# Patient Record
Sex: FEMALE | Race: BLACK OR AFRICAN AMERICAN | NOT HISPANIC OR LATINO | Employment: FULL TIME | URBAN - METROPOLITAN AREA
[De-identification: names, ages, dates, MRNs, and addresses within clinical notes are randomized per-mention and may not be internally consistent; named-entity substitution may affect disease eponyms.]

---

## 2017-02-24 DIAGNOSIS — I10 ESSENTIAL HYPERTENSION, BENIGN: ICD-10-CM

## 2017-02-24 RX ORDER — DILTIAZEM HYDROCHLORIDE 240 MG/1
240 CAPSULE, EXTENDED RELEASE ORAL DAILY
Qty: 90 CAPSULE | Refills: 3 | Status: SHIPPED | OUTPATIENT
Start: 2017-02-24 | End: 2018-03-06

## 2017-02-24 NOTE — TELEPHONE ENCOUNTER
diltiazem 240 MG 24 hr ER capsule   Last Written Prescription Date:  4/1/16  Last Fill Quantity: 90,   # refills: 3  Last Office Visit with G, [facility] or [facility] prescribing provider: 9/13/16  Future Office visit:   None    BP Readings from Last 3 Encounters:   09/13/16 110/77   04/01/16 105/73   02/25/16 100/69

## 2017-03-09 DIAGNOSIS — M54.50 LUMBAGO: ICD-10-CM

## 2017-03-09 NOTE — TELEPHONE ENCOUNTER
cyclobenzaprine (FLEXERIL) 10 MG tablet  Last Written Prescription Date:  4/15/16  Last Fill Quantity: 90 ,   # refills:  3  Last Office Visit with G, P or [facility] prescribing provider: 9/13/16  Future Office visit:   none    Routing refill request to provider for review/approval because:  cyclobenzaprine (FLEXERIL) 10 MG tablet. not on SO protocol.

## 2017-03-10 RX ORDER — CYCLOBENZAPRINE HCL 10 MG
10 TABLET ORAL AT BEDTIME
Qty: 90 TABLET | Refills: 1 | Status: SHIPPED | OUTPATIENT
Start: 2017-03-10 | End: 2017-10-06

## 2017-03-27 DIAGNOSIS — E78.5 HYPERLIPIDEMIA LDL GOAL <100: ICD-10-CM

## 2017-03-27 RX ORDER — ROSUVASTATIN CALCIUM 40 MG/1
40 TABLET, COATED ORAL DAILY
Qty: 90 TABLET | Refills: 1 | Status: SHIPPED | OUTPATIENT
Start: 2017-03-27 | End: 2017-10-06

## 2017-03-27 NOTE — TELEPHONE ENCOUNTER
rosuvastatin (CRESTOR) 40 MG tablet      Last Written Prescription Date:  9/27/2016  Last Fill Quantity: 90,   # refills: 1  Last Office Visit : 9/13/2016  Future Office visit:  0

## 2017-06-13 DIAGNOSIS — I10 HTN (HYPERTENSION): Primary | ICD-10-CM

## 2017-06-13 DIAGNOSIS — K21.9 ESOPHAGEAL REFLUX: Primary | ICD-10-CM

## 2017-06-13 RX ORDER — PANTOPRAZOLE SODIUM 40 MG/1
40 TABLET, DELAYED RELEASE ORAL DAILY
Qty: 90 TABLET | Refills: 0 | Status: SHIPPED | OUTPATIENT
Start: 2017-06-13 | End: 2017-10-06

## 2017-06-13 RX ORDER — IRBESARTAN 150 MG/1
150 TABLET ORAL AT BEDTIME
Qty: 90 TABLET | Refills: 0 | Status: SHIPPED | OUTPATIENT
Start: 2017-06-13 | End: 2017-09-20

## 2017-06-13 NOTE — TELEPHONE ENCOUNTER
pantoprazole (PROTONIX) 40 MG EC tablet      Last Written Prescription Date:  12/15/2016  Last Fill Quantity: 90,   # refills: 1  Last Office Visit : 9/13/2016  Future Office visit:  0

## 2017-06-13 NOTE — TELEPHONE ENCOUNTER
irbesartan (AVAPRO) 150 MG tablet      Last Written Prescription Date:  12/15/2016  Last Fill Quantity: 90,   # refills: 1  Last Office Visit : 9/13/2016  Future Office visit:  0    Potassium   Date Value Ref Range Status   06/21/2016 3.7 3.4 - 5.3 mmol/L Final   ]  Lab Results   Component Value Date    CR 0.70 06/21/2016     BP Readings from Last 1 Encounters:   09/13/16 110/77

## 2017-09-18 ENCOUNTER — TRANSFERRED RECORDS (OUTPATIENT)
Dept: HEALTH INFORMATION MANAGEMENT | Facility: CLINIC | Age: 62
End: 2017-09-18

## 2017-09-20 DIAGNOSIS — I10 HTN (HYPERTENSION): ICD-10-CM

## 2017-09-20 RX ORDER — IRBESARTAN 150 MG/1
150 TABLET ORAL AT BEDTIME
Qty: 30 TABLET | Refills: 0 | Status: SHIPPED | OUTPATIENT
Start: 2017-09-20 | End: 2017-10-06

## 2017-09-20 NOTE — LETTER
Dara Lamb  [address] [address]        September 20, 2017 09/20/17    This letter is a reminder that you are due to see your Primary Care Provider, Dr. Easley, for an Annual Visit. In order to remain current on your prescription medications, you must be seen by your Primary Care Provider on a yearly basis for continued care and prescription refills. Please call [phone] to schedule an appointment for an Annual Visit with Dr. Easley.     [facility],    Primary Care Center

## 2017-09-20 NOTE — TELEPHONE ENCOUNTER
irbesartan (AVAPRO) 150 MG tablet  Last Written Prescription Date:  6/13/17  Last Fill Quantity: 90,   # refills: 0  Last Office Visit with G, P or [facility] prescribing provider: 9/13/16  Future Office visit:   None    Potassium   Date Value Ref Range Status   06/21/2016 3.7 3.4 - 5.3 mmol/L Final   ]  Creatinine   Date Value Ref Range Status   06/21/2016 0.70 0.52 - 1.04 mg/dL Final   ]  BP Readings from Last 3 Encounters:   09/13/16 110/77   04/01/16 105/73   02/25/16 100/69       appt letter sent.    Routing refill request to provider for review/approval because:   irbesartan (AVAPRO) 150 MG tablet. Needs annual appt, labs past due.    Pt called and reminded of needing an appt with Dr Easley before medication can be prescribed.  Clinic numbers given for questions or concerns.  Tori Bettencourt RN 11:08 AM on 9/20/2017.

## 2017-09-20 NOTE — LETTER
Dara Lamb  [address] [address]        September 20, 2017      This letter is a reminder that you are overdue to see your Primary Care Provider for an Annual Visit and needed labs. You must be seen by your Primary Care Provider on a yearly basis and have appropriate labs drawn for continued care and prescription refills. Please call [phone] to schedule an appointment for an Annual Visit with Dr Kaushal KHAN.         [facility],    Primary Care [place]

## 2017-10-06 ENCOUNTER — OFFICE VISIT (OUTPATIENT)
Dept: INTERNAL MEDICINE | Facility: CLINIC | Age: 62
End: 2017-10-06

## 2017-10-06 VITALS
WEIGHT: 211.6 LBS | DIASTOLIC BLOOD PRESSURE: 79 MMHG | HEART RATE: 92 BPM | SYSTOLIC BLOOD PRESSURE: 115 MMHG | HEIGHT: 64 IN | BODY MASS INDEX: 36.12 KG/M2 | RESPIRATION RATE: 16 BRPM

## 2017-10-06 DIAGNOSIS — G89.29 CHRONIC LOW BACK PAIN, UNSPECIFIED BACK PAIN LATERALITY, WITH SCIATICA PRESENCE UNSPECIFIED: ICD-10-CM

## 2017-10-06 DIAGNOSIS — Z12.11 SPECIAL SCREENING FOR MALIGNANT NEOPLASMS, COLON: ICD-10-CM

## 2017-10-06 DIAGNOSIS — R32 URINARY INCONTINENCE, UNSPECIFIED TYPE: ICD-10-CM

## 2017-10-06 DIAGNOSIS — Z17.0 MALIGNANT NEOPLASM OF UPPER-INNER QUADRANT OF RIGHT BREAST IN FEMALE, ESTROGEN RECEPTOR POSITIVE (H): ICD-10-CM

## 2017-10-06 DIAGNOSIS — I10 ESSENTIAL HYPERTENSION: ICD-10-CM

## 2017-10-06 DIAGNOSIS — M85.80 OSTEOPENIA, UNSPECIFIED LOCATION: ICD-10-CM

## 2017-10-06 DIAGNOSIS — M54.5 LOW BACK PAIN, UNSPECIFIED BACK PAIN LATERALITY, UNSPECIFIED CHRONICITY, WITH SCIATICA PRESENCE UNSPECIFIED: ICD-10-CM

## 2017-10-06 DIAGNOSIS — G25.81 RESTLESS LEG SYNDROME: ICD-10-CM

## 2017-10-06 DIAGNOSIS — E03.9 HYPOTHYROIDISM, UNSPECIFIED TYPE: ICD-10-CM

## 2017-10-06 DIAGNOSIS — Z12.31 VISIT FOR SCREENING MAMMOGRAM: ICD-10-CM

## 2017-10-06 DIAGNOSIS — G31.84 MILD COGNITIVE IMPAIRMENT: ICD-10-CM

## 2017-10-06 DIAGNOSIS — F33.1 MAJOR DEPRESSIVE DISORDER, RECURRENT EPISODE, MODERATE (H): ICD-10-CM

## 2017-10-06 DIAGNOSIS — Z23 NEED FOR PROPHYLACTIC VACCINATION AND INOCULATION AGAINST INFLUENZA: ICD-10-CM

## 2017-10-06 DIAGNOSIS — C50.211 MALIGNANT NEOPLASM OF UPPER-INNER QUADRANT OF RIGHT BREAST IN FEMALE, ESTROGEN RECEPTOR POSITIVE (H): ICD-10-CM

## 2017-10-06 DIAGNOSIS — Z00.00 ROUTINE GENERAL MEDICAL EXAMINATION AT A HEALTH CARE FACILITY: Primary | ICD-10-CM

## 2017-10-06 DIAGNOSIS — E78.5 HYPERLIPIDEMIA LDL GOAL <100: ICD-10-CM

## 2017-10-06 DIAGNOSIS — E66.09 CLASS 2 OBESITY DUE TO EXCESS CALORIES WITH BODY MASS INDEX (BMI) OF 36.0 TO 36.9 IN ADULT, UNSPECIFIED WHETHER SERIOUS COMORBIDITY PRESENT: ICD-10-CM

## 2017-10-06 DIAGNOSIS — K21.9 GASTROESOPHAGEAL REFLUX DISEASE WITHOUT ESOPHAGITIS: ICD-10-CM

## 2017-10-06 DIAGNOSIS — M54.5 CHRONIC LOW BACK PAIN, UNSPECIFIED BACK PAIN LATERALITY, WITH SCIATICA PRESENCE UNSPECIFIED: ICD-10-CM

## 2017-10-06 DIAGNOSIS — E66.812 CLASS 2 OBESITY DUE TO EXCESS CALORIES WITH BODY MASS INDEX (BMI) OF 36.0 TO 36.9 IN ADULT, UNSPECIFIED WHETHER SERIOUS COMORBIDITY PRESENT: ICD-10-CM

## 2017-10-06 LAB
ALBUMIN SERPL-MCNC: 3.8 G/DL (ref 3.4–5)
ALBUMIN UR-MCNC: NEGATIVE MG/DL
ALP SERPL-CCNC: 86 U/L (ref 40–150)
ALT SERPL W P-5'-P-CCNC: 33 U/L (ref 0–50)
ANION GAP SERPL CALCULATED.3IONS-SCNC: 6 MMOL/L (ref 3–14)
APPEARANCE UR: ABNORMAL
AST SERPL W P-5'-P-CCNC: 21 U/L (ref 0–45)
BASOPHILS # BLD AUTO: 0 10E9/L (ref 0–0.2)
BASOPHILS NFR BLD AUTO: 0.6 %
BILIRUB SERPL-MCNC: 0.4 MG/DL (ref 0.2–1.3)
BILIRUB UR QL STRIP: NEGATIVE
BUN SERPL-MCNC: 9 MG/DL (ref 7–30)
CALCIUM SERPL-MCNC: 8.6 MG/DL (ref 8.5–10.1)
CHLORIDE SERPL-SCNC: 107 MMOL/L (ref 94–109)
CO2 SERPL-SCNC: 27 MMOL/L (ref 20–32)
COLOR UR AUTO: YELLOW
CREAT SERPL-MCNC: 0.65 MG/DL (ref 0.52–1.04)
CREAT UR-MCNC: 143 MG/DL
DEPRECATED CALCIDIOL+CALCIFEROL SERPL-MC: 39 UG/L (ref 20–75)
DIFFERENTIAL METHOD BLD: NORMAL
EOSINOPHIL # BLD AUTO: 0.1 10E9/L (ref 0–0.7)
EOSINOPHIL NFR BLD AUTO: 1.9 %
ERYTHROCYTE [DISTWIDTH] IN BLOOD BY AUTOMATED COUNT: 14.5 % (ref 10–15)
FERRITIN SERPL-MCNC: 37 NG/ML (ref 8–252)
GFR SERPL CREATININE-BSD FRML MDRD: >90 ML/MIN/1.7M2
GLUCOSE SERPL-MCNC: 89 MG/DL (ref 70–99)
GLUCOSE UR STRIP-MCNC: NEGATIVE MG/DL
HBA1C MFR BLD: 6 % (ref 4.3–6)
HCT VFR BLD AUTO: 40.2 % (ref 35–47)
HGB BLD-MCNC: 13.2 G/DL (ref 11.7–15.7)
HGB UR QL STRIP: NEGATIVE
HYALINE CASTS #/AREA URNS LPF: 1 /LPF (ref 0–2)
IMM GRANULOCYTES # BLD: 0 10E9/L (ref 0–0.4)
IMM GRANULOCYTES NFR BLD: 0.2 %
KETONES UR STRIP-MCNC: NEGATIVE MG/DL
LEUKOCYTE ESTERASE UR QL STRIP: NEGATIVE
LYMPHOCYTES # BLD AUTO: 1.4 10E9/L (ref 0.8–5.3)
LYMPHOCYTES NFR BLD AUTO: 28.5 %
MCH RBC QN AUTO: 30.8 PG (ref 26.5–33)
MCHC RBC AUTO-ENTMCNC: 32.8 G/DL (ref 31.5–36.5)
MCV RBC AUTO: 94 FL (ref 78–100)
MONOCYTES # BLD AUTO: 0.5 10E9/L (ref 0–1.3)
MONOCYTES NFR BLD AUTO: 10.8 %
MUCOUS THREADS #/AREA URNS LPF: PRESENT /LPF
NEUTROPHILS # BLD AUTO: 2.8 10E9/L (ref 1.6–8.3)
NEUTROPHILS NFR BLD AUTO: 58 %
NITRATE UR QL: NEGATIVE
NRBC # BLD AUTO: 0 10*3/UL
NRBC BLD AUTO-RTO: 0 /100
PH UR STRIP: 7 PH (ref 5–7)
PLATELET # BLD AUTO: 187 10E9/L (ref 150–450)
POTASSIUM SERPL-SCNC: 3.8 MMOL/L (ref 3.4–5.3)
PROT SERPL-MCNC: 7.6 G/DL (ref 6.8–8.8)
PROT UR-MCNC: 0.19 G/L
PROT/CREAT 24H UR: 0.13 G/G CR (ref 0–0.2)
RBC # BLD AUTO: 4.28 10E12/L (ref 3.8–5.2)
RBC #/AREA URNS AUTO: 1 /HPF (ref 0–2)
SODIUM SERPL-SCNC: 139 MMOL/L (ref 133–144)
SOURCE: ABNORMAL
SP GR UR STRIP: 1.01 (ref 1–1.03)
SQUAMOUS #/AREA URNS AUTO: 1 /HPF (ref 0–1)
UROBILINOGEN UR STRIP-MCNC: 0 MG/DL (ref 0–2)
WBC # BLD AUTO: 4.8 10E9/L (ref 4–11)
WBC #/AREA URNS AUTO: 1 /HPF (ref 0–2)

## 2017-10-06 RX ORDER — ROSUVASTATIN CALCIUM 40 MG/1
40 TABLET, COATED ORAL DAILY
Qty: 90 TABLET | Refills: 3 | Status: SHIPPED | OUTPATIENT
Start: 2017-10-06 | End: 2018-07-18

## 2017-10-06 RX ORDER — PANTOPRAZOLE SODIUM 40 MG/1
40 TABLET, DELAYED RELEASE ORAL DAILY
Qty: 90 TABLET | Refills: 3 | Status: SHIPPED | OUTPATIENT
Start: 2017-10-06 | End: 2018-09-18

## 2017-10-06 RX ORDER — IRBESARTAN 150 MG/1
150 TABLET ORAL AT BEDTIME
Qty: 90 TABLET | Refills: 3 | Status: SHIPPED | OUTPATIENT
Start: 2017-10-06 | End: 2018-12-21

## 2017-10-06 RX ORDER — CYCLOBENZAPRINE HCL 10 MG
10 TABLET ORAL AT BEDTIME
Qty: 90 TABLET | Refills: 1 | Status: SHIPPED | OUTPATIENT
Start: 2017-10-06 | End: 2018-10-11

## 2017-10-06 RX ORDER — CHLORAL HYDRATE 500 MG
1 CAPSULE ORAL EVERY MORNING
COMMUNITY

## 2017-10-06 RX ORDER — LEVOTHYROXINE SODIUM 137 UG/1
137 TABLET ORAL DAILY
Qty: 90 TABLET | Refills: 3 | Status: CANCELLED | OUTPATIENT
Start: 2017-10-06

## 2017-10-06 ASSESSMENT — PAIN SCALES - GENERAL: PAINLEVEL: NO PAIN (0)

## 2017-10-06 NOTE — NURSING NOTE
Injectable Influenza Immunization Documentation      1.  Has the patient received the information for the injectable influenza vaccine? YES    2. Is the patient 6 months of age or older? YES    3. Does the patient have any of the following contraindications?          Severe allergy to eggs? No     Severe allergic reaction to previous influenza vaccines? No     Allergy to contact lens solution/thimerosol? No     History of Guillain-[place] syndrome? No     Undergoing chemotherapy or radiation therapy?       (vaccine should be given at least 2 weeks prior or 3 weeks after)  No     Currently have moderate or severe illness? No         4.  The vaccine has been administered and the patient was instructed to wait 15 minutes before leaving the building in the event of an allergic reaction: YES    Vaccination given by Narda Prince CMA at 9:03 AM on 10/6/2017.

## 2017-10-06 NOTE — MR AVS SNAPSHOT
After Visit Summary   10/6/2017    Dara Lamb    MRN: [id number]           Patient Information     Date Of Birth          1955        Visit Information        Provider Department      10/6/2017 7:40 AM Rosenda Easley MD [facility]        Today's Diagnoses     Routine general medical examination at a health care facility    -  1    Visit for screening mammogram        Need for prophylactic vaccination and inoculation against influenza        Essential hypertension        Gastroesophageal reflux disease without esophagitis        Hyperlipidemia LDL goal <100        Low back pain, unspecified back pain laterality, unspecified chronicity, with sciatica presence unspecified        Chronic low back pain, unspecified back pain laterality, with sciatica presence unspecified        Hypothyroidism, unspecified type        Class 2 obesity due to excess calories with body mass index (BMI) of 36.0 to 36.9 in adult, unspecified whether serious comorbidity present        Special screening for malignant neoplasms, colon        Urinary incontinence, unspecified type        Mild cognitive impairment        Major depressive disorder, recurrent episode, moderate (H)        Malignant neoplasm of upper-inner quadrant of right breast in female, estrogen receptor positive (H)        Osteopenia, unspecified location        Restless leg syndrome          Care Instructions    [facility] Care Center: [phone]     Primary Care Center Medication Refill Request Information:  * Please contact your pharmacy regarding ANY request for medication refills.  ** [facility] Prescription Fax = [phone]  * Please allow 3 business days for routine medication refills.  * Please allow 5 business days for controlled substance medication refills.     Primary Care Center Test Result notification information:  *You will be notified with in 7-10 days of your appointment day regarding the results of your test.  If you are  on MyChart you will be notified as soon as the provider has reviewed the results and signed off on them.    Schedule consultations with urology, physical/occupational therapy  Continue to see your outside specialists (breast, thyroid, etc.)  Make an appointment to see your ophthalmologist  I printed your referral to [facility] so that you can bring that directly to them  I also wrote a referral to our weight management clinic in case you decide to purse their input regarding weight loss.  I sent prescription refills to your pharmacy, except for the tramadol (printed prescription)  We will obtain a copy of your most recent colonoscopy and I'll get back to you about when the next one is due  Be sure to go to the [facility] ER if your depression escalates.  Insist on talking directly to your psychiatrist  Continue seeing your psychologist  Use Debrox for your left ear wax obstruction  Go to the lab today for blood and urine tests.  Go to the lab in about 3 months for your cholesterol test (after being back on your rosuvastin/Crestor for 3 months)  Keep on exercising as much as possible      General resources in case you are feeling increasingly depressed and/or suicidal:    Call 911 or go directly to an Emergency Room (such as [facility] or [facility]) if you need help immediately        Here are some hotline options:    [place] Residents:  CRISIS CONNECTION [phone]  Or [phone]      CRISIS INTERVENTION CENTER, [facility], [phone]      CRISIS PROGRAM, [facility],   [phone]      NATIONAL SUICIDE PREVENTION LIFELINE  [phone] ([phone])      NATIONAL HOPELINE NETWORK  [phone] (4-508-HDUEFDY)                  Follow-ups after your visit        Additional Services     PHYSICAL THERAPY REFERRAL       [facility].  Patient will make own appointment.  Slick  ADL's, help with home chores/organization.  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            UROLOGY ADULT REFERRAL                 Follow-up notes from your care team     Return in about 1 year (around 10/6/2018) for Physical Exam.      Your next 10 appointments already scheduled     Oct 06, 2017  9:30 AM CDT   LAB with  LAB    [facility] ([facility])    [address] [place] [id number] [phone]           Patient must bring picture ID. Patient should be prepared to give a urine specimen  Please do not eat 10-12 hours before your appointment if you are coming in fasting for labs on lipids, cholesterol, or glucose (sugar). Pregnant women should follow their Care Team instructions. Water with medications is okay. Do not drink coffee or other fluids. If you have concerns about taking  your medications, please ask at office or if scheduling via [employer], send a message by clicking on Secure Messaging, Message Your Care Team.            Dec 07, 2017  3:00 PM CST   (Arrive by 2:45 PM)   NEW FEMALE PELVIC with Bridget See Victoriano Prescott MD   [facility] ([facility])    [address] [place] [id number] [phone]            Jan 08, 2018  8:00 AM CST   LAB with  LAB   [facility] ([facility])    [address] [place] [id number] [phone]           Patient must bring picture ID. Patient should be prepared to give a urine specimen  Please do not eat 10-12 hours before your appointment if you are coming in fasting for labs on lipids, cholesterol, or glucose (sugar). Pregnant women should follow their Care Team instructions. Water with medications is okay. Do not drink coffee or other fluids. If you have concerns about taking  your medications, please ask at office or if scheduling via [employer], send  a message by clicking on Secure Messaging, Message Your Care Team.              Future tests that were ordered for you today     Open Future Orders        Priority Expected Expires Ordered    Ferritin Routine 10/6/2017 10/6/2018 10/6/2017    Vitamin D Deficiency Routine 10/6/2017 10/6/2018 10/6/2017    CBC with platelets differential Routine 10/6/2017 10/20/2017 10/6/2017    Comprehensive metabolic panel Routine 10/6/2017 10/20/2017 10/6/2017    Lipid Profile FASTING Routine 2017 10/6/2018 10/6/2017    Hemoglobin A1c Routine 10/6/2017 10/20/2017 10/6/2017    Protein  random urine with Creat Ratio Routine 10/6/2017 10/6/2018 10/6/2017    UA with Micro reflex to Culture Routine 10/6/2017 10/6/2018 10/6/2017    PHYSICAL THERAPY REFERRAL Routine  10/6/2018 10/6/2017            Who to contact     Please call your clinic at [phone] to:    Ask questions about your health    Make or cancel appointments    Discuss your medicines    Learn about your test results    Speak to your doctor   If you have compliments or concerns about an experience at your clinic, or if you wish to file a complaint, please contact [facility] Patient Relations at [phone] or email us at [email or web address]         Additional Information About Your Visit        eKonnektharIunika Information     Sharegatet is an electronic gateway that provides easy, online access to your medical records. With [employer], you can request a clinic appointment, read your test results, renew a prescription or communicate with your care team.     To sign up for Sharegatet visit the website at [email or web address]   You will be asked to enter the access code listed below, as well as some personal information. Please follow the directions to create your username and password.     Your access code is: [email or web address]  Expires: 2017  6:30 AM     Your access code will  in 90 days. If you need help or a new code, please contact  "your [facility] or call [phone] for assistance.        Care EveryWhere ID     This is your Care EveryWhere ID. This could be used by other organizations to access your [facility] medical records  [id number]        Your Vitals Were     Pulse Respirations Height BMI (Body Mass Index)          92 16 1.613 m (5' 3.5\") 36.9 kg/m2         Blood Pressure from Last 3 Encounters:   10/06/17 115/79   09/13/16 110/77   04/01/16 105/73    Weight from Last 3 Encounters:   10/06/17 96 kg (211 lb 9.6 oz)   09/13/16 93.3 kg (205 lb 11.2 oz)   04/01/16 92.1 kg (203 lb)              We Performed the Following     FLU VACCINE, 3 YRS +, IM  [97964]     UROLOGY ADULT REFERRAL          Today's Medication Changes          These changes are accurate as of: 10/6/17  9:19 AM.  If you have any questions, ask your nurse or doctor.               Stop taking these medicines if you haven't already. Please contact your care team if you have questions.     exemestane 25 MG tablet   Commonly known as:  AROMASIN   Stopped by:  Rosenda Easley MD           OYSCO 500 + D 500-200 MG-UNIT Tabs   Generic drug:  Calcium Carb-Cholecalciferol   Stopped by:  Rosenda Easley MD                Where to get your medicines      These medications were sent to [facility]pharmacy #[id number]  [place], [place] - [address]  [address] [address]     Phone:  [phone]     cyclobenzaprine 10 MG tablet    irbesartan 150 MG tablet    pantoprazole 40 MG EC tablet    rosuvastatin 40 MG tablet         Some of these will need a paper prescription and others can be bought over the counter.  Ask your nurse if you have questions.     Bring a paper prescription for each of these medications     traMADol-acetaminophen 37.5-325 MG per tablet                Primary Care Provider Office Phone # Fax #    Rosenda Easley [phone]       [address] [place] [address] [address] [place] [address]        Equal Access to Services     " [facility] System[facility] : Hadii aad ku clara Velasquez, waaxda luqadaha, qaybta kaalmada adebhakti, damaris jerry javiershabnam mccormack kiravanessa ramsay . So [facility] [phone].    ATENCIÓN: Si habla seamus, tiene a almanza disposición servicios gratuitos de asistencia lingüística. Llame al [phone].    We comply with applicable federal civil rights laws and [place] laws. We do not discriminate on the basis of race, color, national origin, age, disability, sex, sexual orientation, or gender identity.            Thank you!     Thank you for choosing [facility]  for your care. Our goal is always to provide you with excellent care. Hearing back from our patients is one way we can continue to improve our services. Please take a few minutes to complete the written survey that you may receive in the mail after your visit with us. Thank you!             Your Updated Medication List - Protect others around you: Learn how to safely use, store and throw away your medicines at www.disposemymeds.org.          This list is accurate as of: 10/6/17  9:19 AM.  Always use your most recent med list.                   Brand Name Dispense Instructions for use Diagnosis    BRINTELLIX PO      Take 10 mg by mouth daily        calcium carbonate 1250 MG tablet    OS-RUBEN 500 mg [place]. Ca    60 tablet    Take 2 tablets by mouth daily.    Menopause       cyclobenzaprine 10 MG tablet    FLEXERIL    90 tablet    Take 1 tablet (10 mg) by mouth At Bedtime    Low back pain, unspecified back pain laterality, unspecified chronicity, with sciatica presence unspecified       desmopressin 0.2 MG tablet    DDAVP     Take 0.2 mg by mouth At Bedtime        diltiazem 240 MG 24 hr capsule     90 capsule    Take 1 capsule (240 mg) by mouth daily    Essential hypertension, benign       estradiol 0.1 MG/GM cream    ESTRACE     Place 2 g vaginally twice a week        fish oil-omega-3 fatty acids 1000 MG capsule      Take 1 capsule by mouth        gabapentin 600 MG  tablet    NEURONTIN     Take 600 mg by mouth At Bedtime        hydrOXYzine 25 MG tablet    ATARAX    10 tablet    Take 1-2 tablets (25-50 mg) by mouth every 8 hours as needed for itching    Fear of flying       irbesartan 150 MG tablet    AVAPRO    90 tablet    Take 1 tablet (150 mg) by mouth At Bedtime    Essential hypertension       latanoprost 0.005 % ophthalmic solution    XALATAN     Place 1 drop into both eyes At Bedtime        LATUDA 60 MG Tabs tablet   Generic drug:  lurasidone      Take by mouth At Bedtime        lidocaine 5 % Patch    LIDODERM    180 patch    Apply up to 3 patches to painful area at once for up to 12 h within a 24 h period.  Remove after 12 hours.    Lumbago, Shoulder joint pain, right       multivitamin, therapeutic with minerals Tabs tablet     90 each    Take 1 tablet by mouth daily    Esophageal reflux       pantoprazole 40 MG EC tablet    PROTONIX    90 tablet    Take 1 tablet (40 mg) by mouth daily    Gastroesophageal reflux disease without esophagitis       polyethylene glycol powder    MIRALAX    119 g    Take 17 g by mouth daily    Constipation       rosuvastatin 40 MG tablet    CRESTOR    90 tablet    Take 1 tablet (40 mg) by mouth daily    Hyperlipidemia LDL goal <100       SEROQUEL XR 50 MG Tb24 24 hr tablet   Generic drug:  QUEtiapine      Take 75 mg by mouth At Bedtime. Take 1.5 tablets daily.        STRATTERA 60 MG capsule   Generic drug:  atomoxetine      Take 60 mg by mouth 2 times daily        SYNTHROID 137 MCG tablet   Generic drug:  levothyroxine      Take 137 mcg by mouth daily        traMADol-acetaminophen 37.5-325 MG per tablet    ULTRACET    30 tablet    Take one to two tablets every 6 hours as needed for strong back pain.  Maximum #30 tablets in 30 days.    Chronic low back pain, unspecified back pain laterality, with sciatica presence unspecified       vitamin B complex with vitamin C Tabs tablet     90 tablet    Take 1 tablet by mouth daily    Gastroesophageal  reflux disease without esophagitis

## 2017-10-06 NOTE — NURSING NOTE
Chief Complaint   Patient presents with     Physical     pt here for physical     Narda Prince CMA at 7:42 AM on 10/6/2017.

## 2017-10-06 NOTE — PATIENT INSTRUCTIONS
[facility] Care Center: [phone]     Primary Care Center Medication Refill Request Information:  * Please contact your pharmacy regarding ANY request for medication refills.  ** [facility] Prescription Fax = [phone]  * Please allow 3 business days for routine medication refills.  * Please allow 5 business days for controlled substance medication refills.     Primary Care Center Test Result notification information:  *You will be notified with in 7-10 days of your appointment day regarding the results of your test.  If you are on MyChart you will be notified as soon as the provider has reviewed the results and signed off on them.    Schedule consultations with urology, physical/occupational therapy  Continue to see your outside specialists (breast, thyroid, etc.)  Make an appointment to see your ophthalmologist  I printed your referral to [facility] so that you can bring that directly to them  I also wrote a referral to our weight management clinic in case you decide to purse their input regarding weight loss.  I sent prescription refills to your pharmacy, except for the tramadol (printed prescription)  We will obtain a copy of your most recent colonoscopy and I'll get back to you about when the next one is due  Be sure to go to the [facility] ER if your depression escalates.  Insist on talking directly to your psychiatrist  Continue seeing your psychologist  Use Debrox for your left ear wax obstruction  Go to the lab today for blood and urine tests.  Go to the lab in about 3 months for your cholesterol test (after being back on your rosuvastin/Crestor for 3 months)  Keep on exercising as much as possible      General resources in case you are feeling increasingly depressed and/or suicidal:    Call 911 or go directly to an Emergency Room (such as [facility] or [facility]) if you need help immediately        Here are some hotline options:    [facility]  Residents:  CRISIS CONNECTION [phone]  Or [phone]      CRISIS INTERVENTION CENTER, [facility], [phone]      CRISIS PROGRAM, [facility],   [phone]      NATIONAL SUICIDE PREVENTION LIFELINE  [phone] ([phone])      NATIONAL HOPELINE NETWORK  [phone] ([phone]-SVREDHM)

## 2017-10-07 NOTE — PROGRESS NOTES
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  Unfortunately she has been quite depressed lately.  Family and work life have been stressful.  We discussed this.  She is having trouble keeping up with her house keeping.  She has a therapist and is in the process of trying to get an appointment with her psychiatrist.  I reminded her about the availability of the ER should her symptoms escalate.  She is not suicidal.  PHQ-9 score is 5/27 \"very difficult\".  GAD7 score is 3/21 \"somewhat difficult\".  She also reports mild cognitive impairment since chemotherapy for breast cancer 2011. She received OT at [facility] which she found very helpful.  Will write a referral.    All routine health care maintenance parameters were reviewed.  Dara is frustrated about her weight and wonders about pharmacologic management.  I encouraged her to discuss this with her psychiatrist, as she is already on several medications.  I also offered a referral to our weight management clinic.    ROS:  Urinary urgency and occasional incontinence if she can't make it to the bathroom in time.  Rest of 10 point ROS negative.    Patient Active Problem List   Diagnosis     Esophageal reflux     Abnormal Pap smear and cervical HPV (human papillomavirus)     Breast neoplasm     Cerebral aneurysm, nonruptured     Migraine without aura     Malignant neoplasm of connective and other soft tissue     Diverticulitis of colon     Hyperlipidemia     Essential hypertension, benign     Lumbago     Spinal stenosis, lumbar region, without neurogenic claudication     Obstructive sleep apnea     Restless legs syndrome (RLS)     Primary thyroid papillary carcinoma (H)     Bipolar affective disorder (H)     Glaucoma     Class 2 obesity due to excess calories with body mass index (BMI) of 36.0 to 36.9 in adult, unspecified whether serious comorbidity present     Gastroesophageal reflux disease without esophagitis     Essential hypertension "     Low back pain, unspecified back pain laterality, unspecified chronicity, with sciatica presence unspecified     Breast cancer in female (H)     Past Medical History:   Diagnosis Date     Abnormal Pap smear and cervical HPV (human papillomavirus)     DARIO, conization 1990     Acne      Alcohol abuse      Bipolar affective disorder (H)      Breast cancer in female (H) 2011    invasive ductal ca, stage IIB, poorly differentiated,  ER and KS positive, lumpectomy,      Cerebral aneurysm, nonruptured     ophthalmic branch of left ICA. .  Presented with syncope     Closed Colles' fracture      dermatofibrosarcoma     1998     Diverticulitis of colon (without mention of hemorrhage)(562.11)     2007     Esophageal reflux      Essential hypertension, benign      Glaucoma      Lumbago     right spinal canal cyst through L1-2 neuroforamin with large benign root sleeve cyst[facility]     Major depressive disorder      Major depressive disorder, recurrent episode, unspecified     Suicide attempt 10/2000     Menopause     2004     Migraine without aura, without mention of intractable migraine without mention of status migrainosus      Neoplasm of unspecified nature of breast     3.9 cm Stage 2B IDCa ER/KS +, HER2- 5/3/11right breast     Obstructive sleep apnea (adult) (pediatric)      Other and unspecified hyperlipidemia      Postmenopausal bleeding      biopsy negative     Primary thyroid papillary carcinoma (H)      Rectal bleeding     diverticular [facility]     Restless legs syndrome (RLS)      Spinal stenosis, lumbar region, without neurogenic claudication     2003 L4-5     Thyroid cancer (H) 2012    Stage OMAR, papillary ca, s/p thyroidectomy and mod left neck dissection     Past Surgical History:   Procedure Laterality Date      SECTION      1978, 87, 97     COLECTOMY LEFT      sigmoid colon , diverticulitis, colostomy 2008 reanastomosis      COLONOSCOPY      2009     LAMINECTOMY LUMBAR ONE LEVEL      for cauda equina syndrome, 2006     LAPAROTOMY EXPLORATORY      , for ?bowel obstx r/o pelvic infection     LUMPECTOMY BREAST      right breast 5/3/11     MAMMOPLASTY REDUCTION BILATERAL      11     THYROIDECTOMY       TONSILLECTOMY      10/26/03     WRIST SURGERY       Family History   Problem Relation Age of Onset     Prostate Cancer Father      Alcohol/Drug Father      Alcohol/Drug Brother      Psychotic Disorder Son      autism     Psychotic Disorder       ADD (3 children)     C.A.D. Brother      2 MI's with stents     Psychotic Disorder Sister      depression     Psychotic Disorder Brother      depression     Lipids Father      Hypertension Mother      Blood Disease Maternal Grandmother      DVT, PE     CANCER Maternal Grandmother      HEART DISEASE Brother      Aortic and mitral valve replacement     Asthma Sister      CEREBROVASCULAR DISEASE Father      incidental finding on MRI     Social History     Social History     Marital status:      Spouse name: N/A     Number of children: N/A     Years of education: N/A     Occupational History     Not on file.     Social History Main Topics     Smoking status: Never Smoker     Smokeless tobacco: Never Used     Alcohol use No     Drug use: No     Sexual activity: Not on file     Other Topics Concern     Not on file     Social History Narrative    .  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  Has significant other who she sees on occation. Three children all with ADD.  Son with autism.  Daughter attending [facility] [employer]. No tobacco use.  Previous excessive alcohol use, now in remission.    16     Current Outpatient Prescriptions   Medication Sig Dispense Refill     fish oil-omega-3 fatty acids 1000 MG capsule Take 1 capsule by mouth       irbesartan (AVAPRO) 150 MG tablet Take 1 tablet  (150 mg) by mouth At Bedtime 90 tablet 3     pantoprazole (PROTONIX) 40 MG EC tablet Take 1 tablet (40 mg) by mouth daily 90 tablet 3     rosuvastatin (CRESTOR) 40 MG tablet Take 1 tablet (40 mg) by mouth daily 90 tablet 3     cyclobenzaprine (FLEXERIL) 10 MG tablet Take 1 tablet (10 mg) by mouth At Bedtime 90 tablet 1     traMADol-acetaminophen (ULTRACET) 37.5-325 MG per tablet Take one to two tablets every 6 hours as needed for strong back pain.  Maximum #30 tablets in 30 days. 30 tablet 0     diltiazem 240 MG 24 hr capsule Take 1 capsule (240 mg) by mouth daily 90 capsule 3     estradiol (ESTRACE) 0.1 MG/GM vaginal cream Place 2 g vaginally twice a week       latanoprost (XALATAN) 0.005 % ophthalmic solution Place 1 drop into both eyes At Bedtime       Vortioxetine HBr (BRINTELLIX PO) Take 10 mg by mouth daily       vitamin  B complex with vitamin C (STRESS TAB) TABS Take 1 tablet by mouth daily 90 tablet 1     polyethylene glycol (MIRALAX) powder Take 17 g by mouth daily 119 g 2     hydrOXYzine (ATARAX) 25 MG tablet Take 1-2 tablets (25-50 mg) by mouth every 8 hours as needed for itching 10 tablet 0     atomoxetine (STRATTERA) 60 MG capsule Take 60 mg by mouth 2 times daily       gabapentin (NEURONTIN) 600 MG tablet Take 600 mg by mouth At Bedtime       levothyroxine (SYNTHROID) 137 MCG tablet Take 137 mcg by mouth daily       lurasidone (LATUDA) 60 MG TABS tablet Take by mouth At Bedtime       multivitamin, therapeutic with minerals (THERA-VIT-M) TABS Take 1 tablet by mouth daily 90 each 3     QUEtiapine (SEROQUEL XR) 50 MG TB24 Take 75 mg by mouth At Bedtime. Take 1.5 tablets daily.       calcium carbonate (OS-RUBEN 500 MG [place]. CA) 500 MG tablet Take 2 tablets by mouth daily. 60 tablet 0     [DISCONTINUED] irbesartan (AVAPRO) 150 MG tablet Take 1 tablet (150 mg) by mouth At Bedtime 30 tablet 0     [DISCONTINUED] rosuvastatin (CRESTOR) 40 MG tablet Take 1 tablet (40 mg) by mouth daily 90 tablet 1      "desmopressin (DDAVP) 0.2 MG tablet Take 0.2 mg by mouth At Bedtime       lidocaine (LIDODERM) 5 % patch Apply up to 3 patches to painful area at once for up to 12 h within a 24 h period.  Remove after 12 hours. (Patient not taking: Reported on 10/6/2017) 180 patch 3     [DISCONTINUED] losartan (COZAAR) 50 MG tablet Take 1 tablet by mouth daily. 30 tablet 11     Allergies   Allergen Reactions     Contrast Dye Shortness Of Breath     Atorvastatin Calcium      myalgias     Dust Mite Extract Other (See Comments)     Sneezing     Dust Mites Other (See Comments)     Sneezing around dogs and cats     Hydrochlorothiazide      Latex      Oxycodone-Acetaminophen Nausea and Vomiting     Sulfa Drugs      arthralgias     Vicodin [Hydrocodone-Acetaminophen]      Nausea, vomiting     /79  Pulse 92  Resp 16  Ht 1.613 m (5' 3.5\")  Wt 96 kg (211 lb 9.6 oz)  BMI 36.9 kg/m2  Physical Examination:  General:  Pleasant, alert, no acute distress  Eyes:  Pupils 2-3 mm, sclera white, EOM's full.    Ears:  TM's normal, EAC's patent, clear of cerumen  Nose:  Nasal passages clear, turbinates not swollen  Throat/Mouth:  No pharyngeal erythema or exudate, oral mucosa and tongue moist, no suspicious oral lesions  Neck:  Full AROM, supple, thyroid smooth, symmetric, not enlarged, no nodules  Lungs:  Clear to auscultation throughout, no wheezes, rhonchi or rales.  C/V:  Regular rate and rhythm, no murmurs, rubs or gallops.  No JVD, no carotid bruits.  No peripheral edema.   Abdomen:  Not distended.  Bowel sounds active.  No tenderness, no hepatosplenomegaly or masses.  No CVA tenderness or masses.  Lymph:  No cervical lymph nodes.  Neuro:  Alert and oriented, face symmetric. No tremor.  Gait steady.    M/S:   No joint deformities noted.  No joint swelling.  Skin:   No rashes or jaundice.  Normal moisture, good skin turgor.  Psych:  Constricted affect, some psychomotor slowing compared to baseline.    Dara was seen today for " physical.    Diagnoses and all orders for this visit:    Routine general medical examination at a health care facility    Need for prophylactic vaccination and inoculation against influenza  -     FLU VACCINE, 3 YRS +, IM  [25100]    Essential hypertension  -    refill irbesartan (AVAPRO) 150 MG tablet; Take 1 tablet (150 mg) by mouth At Bedtime  -     CBC with platelets differential; Future  -     Comprehensive metabolic panel; Future  -     Hemoglobin A1c; Future  -     Protein  random urine with Creat Ratio; Future    Gastroesophageal reflux disease without esophagitis  -     Refill pantoprazole (PROTONIX) 40 MG EC tablet; Take 1 tablet (40 mg) by mouth daily    Hyperlipidemia LDL goal <100  -     Refill rosuvastatin (CRESTOR) 40 MG tablet; Take 1 tablet (40 mg) by mouth daily  -     Lipid Profile FASTING; Future    Low back pain, unspecified back pain laterality, unspecified chronicity, with sciatica presence unspecified  -     Refill cyclobenzaprine (FLEXERIL) 10 MG tablet; Take 1 tablet (10 mg) by mouth At Bedtime    Chronic low back pain, unspecified back pain laterality, with sciatica presence unspecified  -     Refill traMADol-acetaminophen (ULTRACET) 37.5-325 MG per tablet; Take one to two tablets every 6 hours as needed for strong back pain.  Maximum #30 tablets in 30 days.    History of thyroid cancer, hypothyroidism, unspecified type, managed at outside clinic    Class 2 obesity due to excess calories with body mass index (BMI) of 36.0 to 36.9 in adult, unspecified whether serious comorbidity present  Referral weight management clinic    Special screening for malignant neoplasms, colon--will track down outside colonoscopy reports from [facility]    Urinary incontinence, unspecified type  -     UA with Micro reflex to Culture; Future  -     UROLOGY ADULT REFERRAL    Mild cognitive impairment  -     PHYSICAL THERAPY/OT REFERRAL; Future    Major depressive disorder, recurrent episode, moderate (H)    Malignant  neoplasm of upper-inner quadrant of right breast in female, estrogen receptor positive (H), in remission    Osteopenia, unspecified location  -     Vitamin D Deficiency; Future    Restless leg syndrome  -     Ferritin; Future    The following verbal and written instructions were given to the patient:  Schedule consultations with urology, physical/occupational therapy  Continue to see your outside specialists (breast, thyroid, etc.)  Make an appointment to see your ophthalmologist  I printed your referral to [facility] so that you can bring that directly to them  I also wrote a referral to our weight management clinic in case you decide to purse their input regarding weight loss.  I sent prescription refills to your pharmacy, except for the tramadol (printed prescription)  We will obtain a copy of your most recent colonoscopy and I'll get back to you about when the next one is due  Be sure to go to the [facility] ER if your depression escalates.  Insist on talking directly to your psychiatrist  Continue seeing your psychologist  Use Debrox for your left ear wax obstruction  Go to the lab today for blood and urine tests.  Go to the lab in about 3 months for your cholesterol test (after being back on your rosuvastin/Crestor for 3 months)  Keep on exercising as much as possible      General resources in case you are feeling increasingly depressed and/or suicidal:    Call 911 or go directly to an Emergency Room (such as [facility] or [facility]) if you need help immediately    Here are some hotline options:    [place] Residents:  CRISIS CONNECTION [phone]  Or [phone]  [facility]ISIS INTERVENTION [facility], [facility], [phone]  CRISIS PROGRAM, [facility],   [phone]  [employer] [facility]  [phone] ([phone])  [employer]  [phone]  (4-253-JEOWJVX)    Rosenda Easley M.D.  Internal Medicine  [facility]   pager [phone]

## 2017-10-09 ENCOUNTER — PRE VISIT (OUTPATIENT)
Dept: UROLOGY | Facility: CLINIC | Age: 62
End: 2017-10-09

## 2017-10-17 NOTE — TELEPHONE ENCOUNTER
Pt called, states her pharmacy told her they're still waiting on 4 of her Rx but she does not know which one they are. Would like you to contact her pharmacy and find out what they're still waiting on.     Please call [id number] - [place], [place] - [address]     Phone # [phone]              [facility] and the [facility] was not aware of any RX's.  Tori Bettencourt RN 3:41 PM on 10/17/2017.

## 2017-10-24 ASSESSMENT — PATIENT HEALTH QUESTIONNAIRE - PHQ9
SUM OF ALL RESPONSES TO PHQ QUESTIONS 1-9: 5
5. POOR APPETITE OR OVEREATING: SEVERAL DAYS

## 2017-10-24 ASSESSMENT — ANXIETY QUESTIONNAIRES
IF YOU CHECKED OFF ANY PROBLEMS ON THIS QUESTIONNAIRE, HOW DIFFICULT HAVE THESE PROBLEMS MADE IT FOR YOU TO DO YOUR WORK, TAKE CARE OF THINGS AT HOME, OR GET ALONG WITH OTHER PEOPLE: SOMEWHAT DIFFICULT
7. FEELING AFRAID AS IF SOMETHING AWFUL MIGHT HAPPEN: NOT AT ALL
6. BECOMING EASILY ANNOYED OR IRRITABLE: NOT AT ALL
1. FEELING NERVOUS, ANXIOUS, OR ON EDGE: NOT AT ALL
3. WORRYING TOO MUCH ABOUT DIFFERENT THINGS: SEVERAL DAYS
5. BEING SO RESTLESS THAT IT IS HARD TO SIT STILL: NOT AT ALL
GAD7 TOTAL SCORE: 3
2. NOT BEING ABLE TO STOP OR CONTROL WORRYING: SEVERAL DAYS

## 2017-10-25 ASSESSMENT — ANXIETY QUESTIONNAIRES: GAD7 TOTAL SCORE: 3

## 2017-11-18 ENCOUNTER — HEALTH MAINTENANCE LETTER (OUTPATIENT)
Age: 62
End: 2017-11-18

## 2017-11-21 ENCOUNTER — PRE VISIT (OUTPATIENT)
Dept: UROLOGY | Facility: CLINIC | Age: 62
End: 2017-11-21

## 2017-11-21 NOTE — TELEPHONE ENCOUNTER
Patient with incontinence coming in for a consult. Chart reviewed and all records available. Pt will need a dip/pvr.

## 2017-11-27 DIAGNOSIS — M54.5 CHRONIC LOW BACK PAIN, UNSPECIFIED BACK PAIN LATERALITY, WITH SCIATICA PRESENCE UNSPECIFIED: ICD-10-CM

## 2017-11-27 DIAGNOSIS — G89.29 CHRONIC LOW BACK PAIN, UNSPECIFIED BACK PAIN LATERALITY, WITH SCIATICA PRESENCE UNSPECIFIED: ICD-10-CM

## 2017-11-28 ENCOUNTER — OFFICE VISIT - HEALTHEAST (OUTPATIENT)
Dept: PHYSICAL MEDICINE AND REHAB | Facility: REHABILITATION | Age: 62
End: 2017-11-28

## 2017-11-28 DIAGNOSIS — M43.26 ANKYLOSIS OF LUMBAR SPINE: ICD-10-CM

## 2017-11-28 DIAGNOSIS — M47.816 LUMBAR SPONDYLOSIS: ICD-10-CM

## 2017-11-28 DIAGNOSIS — M41.9 SCOLIOSIS: ICD-10-CM

## 2017-11-28 DIAGNOSIS — M47.816 ARTHROPATHY OF LUMBAR FACET JOINT: ICD-10-CM

## 2017-11-28 DIAGNOSIS — M54.50 LUMBAR SPINE PAIN: ICD-10-CM

## 2017-11-28 DIAGNOSIS — M51.369 DEGENERATIVE LUMBAR DISC: ICD-10-CM

## 2017-11-28 ASSESSMENT — MIFFLIN-ST. JEOR: SCORE: 1452.61

## 2017-11-28 NOTE — TELEPHONE ENCOUNTER
Last Written Prescription Date:  10/6/17  Last Fill Quantity: 30,   # refills: 0  Last Office Visit : 10/6/17  Future Office visit:  NONE  Routing refill request to provider for review/approval because:  Drug not on refill protocol or controlled substance    RX request sent to Dr Easley

## 2017-11-30 LAB
HLA-B27 RESULT - HISTORICAL: NEGATIVE
INTERPRETATION: NORMAL

## 2017-11-30 NOTE — TELEPHONE ENCOUNTER
Okay for #30 tab, no refill.  Patient needs to make an appointment with me specifically to discuss chronic pain management.  I will write an order for both the rx and for her to have a urine tox screen before her visit with me.  Please let her know the plan.  Thank you.  SB    Message with plan of care left on pt phone. RX faxed to [facility].  Tori Bettencourt RN 1:26 PM on 11/30/2017.

## 2017-12-05 ENCOUNTER — COMMUNICATION - HEALTHEAST (OUTPATIENT)
Dept: PHYSICAL MEDICINE AND REHAB | Facility: CLINIC | Age: 62
End: 2017-12-05

## 2017-12-08 ENCOUNTER — HOSPITAL ENCOUNTER (OUTPATIENT)
Dept: MRI IMAGING | Facility: CLINIC | Age: 62
Discharge: HOME | End: 2017-12-08
Attending: PHYSICAL MEDICINE & REHABILITATION

## 2017-12-08 ENCOUNTER — HOSPITAL ENCOUNTER (OUTPATIENT)
Dept: RADIOLOGY | Facility: CLINIC | Age: 62
Discharge: HOME | End: 2017-12-08
Attending: PHYSICAL MEDICINE & REHABILITATION

## 2017-12-08 DIAGNOSIS — M12.88 OTHER SPECIFIC ARTHROPATHIES, NOT ELSEWHERE CLASSIFIED, OTHER SPECIFIED SITE: ICD-10-CM

## 2017-12-08 DIAGNOSIS — M47.816 LUMBAR SPONDYLOSIS: ICD-10-CM

## 2017-12-08 DIAGNOSIS — M54.50 LUMBAR SPINE PAIN: ICD-10-CM

## 2017-12-08 DIAGNOSIS — M47.816 ARTHROPATHY OF LUMBAR FACET JOINT: ICD-10-CM

## 2017-12-08 DIAGNOSIS — M41.9 SCOLIOSIS: ICD-10-CM

## 2017-12-08 DIAGNOSIS — M43.26 ANKYLOSIS OF LUMBAR SPINE: ICD-10-CM

## 2017-12-08 DIAGNOSIS — M51.369 DEGENERATIVE LUMBAR DISC: ICD-10-CM

## 2017-12-12 ENCOUNTER — COMMUNICATION - HEALTHEAST (OUTPATIENT)
Dept: PHYSICAL MEDICINE AND REHAB | Facility: CLINIC | Age: 62
End: 2017-12-12

## 2017-12-12 DIAGNOSIS — M47.816 FACET ARTHROPATHY, LUMBAR: ICD-10-CM

## 2017-12-15 ENCOUNTER — HOSPITAL ENCOUNTER (OUTPATIENT)
Dept: PHYSICAL MEDICINE AND REHAB | Facility: CLINIC | Age: 62
Discharge: HOME | End: 2017-12-15
Attending: PHYSICAL MEDICINE & REHABILITATION

## 2017-12-15 DIAGNOSIS — M12.88 OTHER SPECIFIC ARTHROPATHIES, NOT ELSEWHERE CLASSIFIED, OTHER SPECIFIED SITE: ICD-10-CM

## 2017-12-15 DIAGNOSIS — M47.816 FACET ARTHROPATHY, LUMBAR: ICD-10-CM

## 2018-01-17 ENCOUNTER — AMBULATORY - HEALTHEAST (OUTPATIENT)
Dept: PHYSICAL MEDICINE AND REHAB | Facility: CLINIC | Age: 63
End: 2018-01-17

## 2018-01-17 ENCOUNTER — COMMUNICATION - HEALTHEAST (OUTPATIENT)
Dept: PHYSICAL MEDICINE AND REHAB | Facility: CLINIC | Age: 63
End: 2018-01-17

## 2018-01-17 DIAGNOSIS — M47.816 LUMBAR SPONDYLOSIS: ICD-10-CM

## 2018-01-25 ENCOUNTER — OFFICE VISIT (OUTPATIENT)
Dept: UROLOGY | Facility: CLINIC | Age: 63
End: 2018-01-25
Payer: COMMERCIAL

## 2018-01-25 VITALS
WEIGHT: 208.3 LBS | HEART RATE: 96 BPM | SYSTOLIC BLOOD PRESSURE: 123 MMHG | BODY MASS INDEX: 35.56 KG/M2 | HEIGHT: 64 IN | DIASTOLIC BLOOD PRESSURE: 86 MMHG

## 2018-01-25 DIAGNOSIS — N39.41 URGE INCONTINENCE: ICD-10-CM

## 2018-01-25 DIAGNOSIS — R39.15 URINARY URGENCY: Primary | ICD-10-CM

## 2018-01-25 DIAGNOSIS — M62.89 PELVIC FLOOR DYSFUNCTION: ICD-10-CM

## 2018-01-25 DIAGNOSIS — M79.18 MYALGIA OF PELVIC FLOOR: ICD-10-CM

## 2018-01-25 LAB
APPEARANCE UR: NORMAL
BILIRUB UR QL: NORMAL
COLOR UR: YELLOW
GLUCOSE URINE: NORMAL MG/DL
HGB UR QL: NORMAL
KETONES UR QL: NORMAL MG/DL
LEUKOCYTE ESTERASE URINE: NORMAL
NITRITE UR QL STRIP: NORMAL
PH UR STRIP: 8 PH (ref 5–7)
PROTEIN ALBUMIN URINE: NORMAL MG/DL
SOURCE: NORMAL
SP GR UR STRIP: 1.01 (ref 1–1.03)
UROBILINOGEN UR QL STRIP: 0.2 EU/DL (ref 0.2–1)

## 2018-01-25 ASSESSMENT — PAIN SCALES - GENERAL: PAINLEVEL: NO PAIN (0)

## 2018-01-25 NOTE — MR AVS SNAPSHOT
After Visit Summary   1/25/2018    Dara Lamb    MRN: [id number]           Patient Information     Date Of Birth          1955        Visit Information        Provider Department      1/25/2018 7:30 AM Bridget Prescott MD [facility] and [facility]state [facility] Urologic C[facility]        Today's Diagnoses     Urinary urgency    -  1    Urge incontinence        Myalgia of pelvic floor        Pelvic floor dysfunction          Care Instructions    Please contact your eye doctor about the anticholinergic.  [phone] is our fax number for your eye doctor to fax us a okay for you to take an anticholinergic medication    Websites with free information:    American Urogynecologic Society patient website: www.[email or web address]    Total Control Program: www.totalcontrolprogram.com    Please see one of the dedicated pelvic floor physical therapist ([facility] [phone])    Please return to see me in 3 months for possible cystoscopy, sooner if needed    It was a pleasure meeting with you today.  Thank you for allowing me and my team the privilege of caring for you today.  YOU are the reason we are here, and I truly hope we provided you with the excellent service you deserve.  Please let us know if there is anything else we can do for you so that we can be sure you are leaving completely satisfied with your care experience.              Follow-ups after your visit        Additional Services     SAMMY Physical Therapy Referral       **This order will print in the [facility] Scheduling Office**    Physical Therapy, Hand Therapy and Chiropractic Care are available through:    *[facility]  *[facility]  *[facility]    Call one number to schedule at any of the above locations: [phone].    Your provider has referred you to: Physical Therapy at [facility] or [facility]    Indication/Reason for Referral: Women's Health (Please  Complete Special Programs SmartList)  Onset of Illness: years  Therapy Orders: Evaluate and Treat  Special Programs: None and Women's Health: Pelvic Dysfunction: myofascial tenderness of the pelvic floor dysfucntion  Pelvic Floor Weakness: urinary urgency, urge incontinence and  Biofeedback, E-Stim/TENS/TENS Rental if deemed appropriate by therapist, Exercise/HEP and Myofascial Release/Massage (internal)  Special Request: Exercise: Home Exercise Program  Manual Therapy: Myofascial Release/Massage (internal)  Modalities: As Indicated E-Stim/TENS    Jordin Brenner      Additional Comments for the Therapist or Chiropractor: No alden please.  Core strengthening    Please be aware that coverage of these services is subject to the terms and limitations of your health insurance plan.  Call member services at your health plan with any benefit or coverage questions.      Please bring the following to your appointment:    *Your personal calendar for scheduling future appointments  *Comfortable clothing                  Who to contact     Please call your clinic at [phone] to:    Ask questions about your health    Make or cancel appointments    Discuss your medicines    Learn about your test results    Speak to your doctor            Additional Information About Your Visit        Love Home Swap Information     [employer] Home [employer]ap is an electronic gateway that provides easy, online access to your medical records. With [employer]ap, you can request a clinic appointment, read your test results, renew a prescription or communicate with your care team.     To sign up for [employer] visit the website at [email or web address]   You will be asked to enter the access code listed below, as well as some personal information. Please follow the directions to create your username and password.     Your access code is: [email or web address]  Expires: 2018  6:31 AM     Your access code will  in 90 days. If you need help or a new code, please contact your  "[facility] or call [phone] for assistance.        Care EveryWhere ID     This is your Care EveryWhere ID. This could be used by other organizations to access your [place] medical records  [id number]        Your Vitals Were     Pulse Height BMI (Body Mass Index)             96 1.613 m (5' 3.5\") 36.32 kg/m2          Blood Pressure from Last 3 Encounters:   04/26/18 124/79   01/25/18 123/86   10/06/17 115/79    Weight from Last 3 Encounters:   04/26/18 95.6 kg (210 lb 12.8 oz)   01/25/18 94.5 kg (208 lb 4.8 oz)   10/06/17 96 kg (211 lb 9.6 oz)              We Performed the Following     SAMMY Physical Therapy Referral     Urinalysis chemical screen POCT          Today's Medication Changes          These changes are accurate as of 1/25/18 11:59 PM.  If you have any questions, ask your nurse or doctor.               These medicines have changed or have updated prescriptions.        Dose/Directions    TRINTELLIX 20 MG tablet   This may have changed:  Another medication with the same name was removed. Continue taking this medication, and follow the directions you see here.   Generic drug:  vortioxetine   Changed by:  Bridget Prescott See MD Victoriano        Dose:  20 mg   20 mg   Refills:  0         Stop taking these medicines if you haven't already. Please contact your care team if you have questions.     lidocaine 5 % Patch   Commonly known as:  LIDODERM   Stopped by:  Bridget Prescott MD                    Primary Care Provider Office Phone # Fax #    Rosenda AGUDELO MD [place] [phone]       [address] [place] [id number]        Equal Access to Services     CLAUDIA RAMSEY : Haddebbie Velasquez, rebekahda perez, qaybta careyaldamaris booth. [facility] [phone].    ATENCIÓN: Si habla español, tiene a almanza disposición servicios gratuitos de asistencia lingüística. Llame al [phone].    We comply with applicable " federal civil rights laws and [place] laws. We do not discriminate on the basis of race, color, national origin, age, disability, sex, sexual orientation, or gender identity.            Thank you!     Thank you for choosing [facility] UROLOGY AND [facility] FOR PROSTATE AND UROLOGIC CANCERS  for your care. Our goal is always to provide you with excellent care. Hearing back from our patients is one way we can continue to improve our services. Please take a few minutes to complete the written survey that you may receive in the mail after your visit with us. Thank you!             Your Updated Medication List - Protect others around you: Learn how to safely use, store and throw away your medicines at www.disposemymeds.org.          This list is accurate as of 1/25/18 11:59 PM.  Always use your most recent med list.                   Brand Name Dispense Instructions for use Diagnosis    calcium carbonate 500 tablet    OS-RUBEN 500 mg [place]. [place]    60 tablet    Take 2 tablets by mouth daily.    Menopause       cyclobenzaprine 10 MG tablet    FLEXERIL    90 tablet    Take 1 tablet (10 mg) by mouth At Bedtime    Low back pain, unspecified back pain laterality, unspecified chronicity, with sciatica presence unspecified       desmopressin 0.2 MG tablet    DDAVP     Take 0.2 mg by mouth At Bedtime        estradiol 0.1 MG/GM cream    ESTRACE     Place 2 g vaginally twice a week        fish oil-omega-3 fatty acids 1000 MG capsule      Take 1 capsule by mouth        gabapentin 600 MG tablet    NEURONTIN     Take 600 mg by mouth At Bedtime        irbesartan 150 MG tablet    AVAPRO    90 tablet    Take 1 tablet (150 mg) by mouth At Bedtime    Essential hypertension       latanoprost 0.005 % ophthalmic solution    XALATAN     Place 1 drop into both eyes At Bedtime        LATUDA 60 MG Tabs tablet   Generic drug:  lurasidone      Take by mouth At Bedtime        multivitamin, therapeutic with minerals Tabs tablet     90 each    Take 1 tablet by  mouth daily    Esophageal reflux       pantoprazole 40 MG EC tablet    PROTONIX    90 tablet    Take 1 tablet (40 mg) by mouth daily    Gastroesophageal reflux disease without esophagitis       polyethylene glycol powder    MIRALAX    119 g    Take 17 g by mouth daily    Constipation       rosuvastatin 40 MG tablet    CRESTOR    90 tablet    Take 1 tablet (40 mg) by mouth daily    Hyperlipidemia LDL goal <100       SEROQUEL XR 50 MG Tb24 24 hr tablet   Generic drug:  QUEtiapine      Take 75 mg by mouth At Bedtime. Take 1.5 tablets daily.        STRATTERA 60 MG capsule   Generic drug:  atomoxetine      Take 60 mg by mouth 2 times daily        SYNTHROID 137 MCG tablet   Generic drug:  levothyroxine      Take 137 mcg by mouth daily        traMADol-acetaminophen 37.5-325 MG per tablet    ULTRACET    30 tablet    TAKE 1 TO 2 TABLETS BY MOUTH EVERY 6 HOURS AS NEEDED FOR STRONG BACK PAIN. MAX IS 30 TABS IN 30 DAYS.  See doctor for additional refills.    Chronic low back pain, unspecified back pain laterality, with sciatica presence unspecified       TRINTELLIX 20 MG tablet   Generic drug:  vortioxetine      20 mg        vitamin B complex with vitamin C Tabs tablet     90 tablet    Take 1 tablet by mouth daily    Gastroesophageal reflux disease without esophagitis

## 2018-01-25 NOTE — LETTER
2018       RE: Dara Lamb  [address] [address]     Dear Colleague,    Thank you for referring your patient, Dara Lamb, to the [facility] UROLOGY AND INST FOR PROSTATE AND UROLOGIC CANCERS at [facility]. Please see a copy of my visit note below.    Urology Consult    Name:  Dara Lamb  MRN:  [id number]  Age/: 62 year old, 1955    CC: Urinary Urgency & incontinence    HPI: Dara Lamb is a(n) 62 year old female referred by Rosenda Easley MD for evaluation of urinary urgency & urge incontinence.  Patient notes that her incontinence has been present for several years.  She notes that she has urinary urgency and occasional urge incontinence.  This has been worsening over the past few years.  She will note that when she has to void and does not make it to the bathroom, she will often empty her entire bladder.  She occasionally uses pads and she occasionally uses an impressa    Her incontinence is not worsened with activity.  She notes a history of many prior abdominal surgeries.  She has undergone exploration at 6 months of age due to concern for a bowel obstruction; she has undergone  x3.  Several years ago, she had free air in the abdomen and underwent sigmoid colectomy and colostomy with eventual takedown.  She has undergone laminectomy for cauda equina syndrome; she occasionally has numbness in the thighs when walking.  She has never undergone pelvic radiation.    She does endorse a history of gross hematuria several years ago, but has never had any urolithiasis.  She has not had prior hematuria workup.  She endorses no history of prior surgery to the kidneys, ureters, or bladder.  She is sexually active and feels safe at home.  She has no concerns of a urinary tract infection.  She is a non-smoker.  She works as a clinical psychiatrist and outpatient medicine.    Previous interventions include:  None  Medications include: None    Past Medical History:  Past Medical History:   Diagnosis Date     Abnormal Pap smear and cervical HPV (human papillomavirus)     DARIO, conization 1990     Acne      Alcohol abuse      Bipolar affective disorder (H)      Breast cancer in female (H) 2011    invasive ductal ca, stage IIB, poorly differentiated,  ER and GA positive, lumpectomy,      Cerebral aneurysm, nonruptured     ophthalmic branch of left ICA. .  Presented with syncope     Closed Colles' fracture      dermatofibrosarcoma     1998     Diverticulitis of colon (without mention of hemorrhage)(562.11)     2007     Esophageal reflux      Essential hypertension, benign      Glaucoma      Lumbago     right spinal canal cyst through L1-2 neuroforamin with large benign root sleeve [facility]     Major depressive disorder      Major depressive disorder, recurrent episode, unspecified     Suicide attempt 10/2000     Menopause     2004     Migraine without aura, without mention of intractable migraine without mention of status migrainosus      Neoplasm of unspecified nature of breast     3.9 cm Stage 2B IDCa ER/GA +, HER2- 5/3/11right breast     Obstructive sleep apnea (adult) (pediatric)      Other and unspecified hyperlipidemia      Postmenopausal bleeding      biopsy negative     Primary thyroid papillary carcinoma (H)      Rectal bleeding     divert[facility]     Restless legs syndrome (RLS)      Spinal stenosis, lumbar region, without neurogenic claudication      L4-5     Thyroid cancer (H) 2012    Stage OMAR, papillary ca, s/p thyroidectomy and mod left neck dissection     Past Surgical History:  Past Surgical History:   Procedure Laterality Date      SECTION      1978, 87, 97     COLECTOMY LEFT      sigmoid colon , diverticulitis, colostomy  reanastomosis     COLONOSCOPY      2009     LAMINECTOMY LUMBAR ONE LEVEL       for cauda equina syndrome, 2006     LAPAROTOMY EXPLORATORY      1955,1978 for ?bowel obstx r/o pelvic infection     LUMPECTOMY BREAST      right breast 5/3/11     MAMMOPLASTY REDUCTION BILATERAL      5/6/11     THYROIDECTOMY       TONSILLECTOMY      10/26/03     WRIST SURGERY       Allergies:     Allergies   Allergen Reactions     Contrast Dye Shortness Of Breath     Atorvastatin Calcium      myalgias     Dust Mite Extract Other (See Comments)     Sneezing     Dust Mites Other (See Comments)     Sneezing around dogs and cats     Hydrochlorothiazide      Latex      Oxycodone-Acetaminophen Nausea and Vomiting     Sulfa Drugs      arthralgias     Vicodin [Hydrocodone-Acetaminophen]      Nausea, vomiting     Medications:    Current Outpatient Prescriptions on File Prior to Visit:  traMADol-acetaminophen (ULTRACET) 37.5-325 MG per tablet TAKE 1 TO 2 TABLETS BY MOUTH EVERY 6 HOURS AS NEEDED FOR STRONG BACK PAIN. MAX IS 30 TABS IN 30 DAYS.  See doctor for additional refills.   fish oil-omega-3 fatty acids 1000 MG capsule Take 1 capsule by mouth   irbesartan (AVAPRO) 150 MG tablet Take 1 tablet (150 mg) by mouth At Bedtime   pantoprazole (PROTONIX) 40 MG EC tablet Take 1 tablet (40 mg) by mouth daily   rosuvastatin (CRESTOR) 40 MG tablet Take 1 tablet (40 mg) by mouth daily   cyclobenzaprine (FLEXERIL) 10 MG tablet Take 1 tablet (10 mg) by mouth At Bedtime   diltiazem 240 MG 24 hr capsule Take 1 capsule (240 mg) by mouth daily   estradiol (ESTRACE) 0.1 MG/GM vaginal cream Place 2 g vaginally twice a week   latanoprost (XALATAN) 0.005 % ophthalmic solution Place 1 drop into both eyes At Bedtime   Vortioxetine HBr (BRINTELLIX PO) Take 10 mg by mouth daily   vitamin  B complex with vitamin C (STRESS TAB) TABS Take 1 tablet by mouth daily   polyethylene glycol (MIRALAX) powder Take 17 g by mouth daily   hydrOXYzine (ATARAX) 25 MG tablet Take 1-2 tablets (25-50 mg) by mouth every 8 hours as needed for itching   atomoxetine  (STRATTERA) 60 MG capsule Take 60 mg by mouth 2 times daily   gabapentin (NEURONTIN) 600 MG tablet Take 600 mg by mouth At Bedtime   levothyroxine (SYNTHROID) 137 MCG tablet Take 137 mcg by mouth daily   lurasidone (LATUDA) 60 MG TABS tablet Take by mouth At Bedtime   desmopressin (DDAVP) 0.2 MG tablet Take 0.2 mg by mouth At Bedtime   lidocaine (LIDODERM) 5 % patch Apply up to 3 patches to painful area at once for up to 12 h within a 24 h period.  Remove after 12 hours. (Patient not taking: Reported on 10/6/2017)   multivitamin, therapeutic with minerals (THERA-VIT-M) TABS Take 1 tablet by mouth daily   QUEtiapine (SEROQUEL XR) 50 MG TB24 Take 75 mg by mouth At Bedtime. Take 1.5 tablets daily.   [DISCONTINUED] losartan (COZAAR) 50 MG tablet Take 1 tablet by mouth daily.   calcium carbonate (OS-RUBEN 500 MG [place]. CA) 500 MG tablet Take 2 tablets by mouth daily.     No current facility-administered medications on file prior to visit.     Social History:  Social History     Social History     Marital status:      Spouse name: N/A     Number of children: N/A     Years of education: N/A     Occupational History     Not on file.     Social History Main Topics     Smoking status: Never Smoker     Smokeless tobacco: Never Used     Alcohol use No     Drug use: No     Sexual activity: Not on file     Other Topics Concern     Not on file     Social History Narrative    .  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  Has significant other who she sees on occation. Three children all with ADD.  Son with autism.  Daughter attending [employer]. No tobacco use.  Previous excessive alcohol use, now in remission.    16       Family History:  Family History   Problem Relation Age of Onset     Prostate Cancer Father      Alcohol/Drug Father      Alcohol/Drug Brother      Psychotic Disorder Son      autism     Psychotic Disorder        ADD (3 children)     C.A.D. Brother      2 MI's with stents     Psychotic Disorder Sister      depression     Psychotic Disorder Brother      depression     Lipids Father      Hypertension Mother      Blood Disease Maternal Grandmother      DVT, PE     CANCER Maternal Grandmother      HEART DISEASE Brother      Aortic and mitral valve replacement     Asthma Sister      CEREBROVASCULAR DISEASE Father      incidental finding on MRI     ROS:  Review of Systems     HENT:  Negative for ear pain, hearing loss, tinnitus, nosebleeds, trouble swallowing, hoarse voice, mouth sores, sore throat, ear discharge, tooth pain, gum tenderness, taste disturbance, smell disturbance, hearing aid, bleeding gums, dry mouth, sinus pain, sinus congestion and neck mass.    Eyes:  Negative for double vision, pain, redness, eye pain, decreased vision, eye watering, eye bulging, eye dryness, flashing lights, spots, floaters, strabismus, tunnel vision, jaundice and eye irritation.   Respiratory:   Negative for cough, hemoptysis, sputum production, shortness of breath, wheezing, sleep disturbances due to breathing, snores loudly, respiratory pain, dyspnea on exertion, cough disturbing sleep and postural dyspnea.    Cardiovascular:  Negative for chest pain, dyspnea on exertion, palpitations, orthopnea, claudication, leg swelling, fingers/toes turn blue, hypertension, hypotension, syncope, history of heart murmur, chest pain on exertion, chest pain at rest, pacemaker, few scattered varicosities, leg pain, sleep disturbances due to breathing, tachycardia, light-headedness, exercise intolerance and edema.   Gastrointestinal:  Negative for heartburn, nausea, vomiting, abdominal pain, diarrhea, constipation, blood in stool, melena, rectal pain, bloating, hemorrhoids, bowel incontinence, jaundice, rectal bleeding, coffee ground emesis and change in stool.   Skin:  Negative for nail changes, itching, poor wound healing, rash, hair changes, skin  "changes, acne, warts, poor wound healing, scarring, flaky skin, Raynaud's phenomenon, sensitivity to sunlight and skin thickening.   Neurological:  Negative for light-headedness.   Endocrine:  Negative for unwanted hair growth and change in facial hair.    Exam:  /86  Pulse 96  Ht 1.613 m (5' 3.5\")  Wt 94.5 kg (208 lb 4.8 oz)  BMI 36.32 kg/m2  General: Alert, interactive, & in NAD; pleasant  Resp: Breathing is non-labored on room air   Cardiac: Extremities warm  Abdomen: Soft, non-tender, nondistended. Surgical scars present without hernias.  : Normal external female genitalia.  Negative ESST.  Pelvic exam remarkable for diffuse myofascial tenderness of the pelvic floor  Extremities: No LE edema or obvious joint abnormalities  Skin: Warm and dry, no jaundice or rash    Labs:  All laboratory data reviewed       Lab Results   Component Value Date     10/06/2017    Lab Results   Component Value Date    CHLORIDE 107 10/06/2017    Lab Results   Component Value Date    BUN 9 10/06/2017      Lab Results   Component Value Date    POTASSIUM 3.8 10/06/2017    Lab Results   Component Value Date    CO2 27 10/06/2017    Lab Results   Component Value Date    CR 0.65 10/06/2017        Lab Results   Component Value Date    WBC 4.8 10/06/2017    HGB 13.2 10/06/2017    HCT 40.2 10/06/2017    MCV 94 10/06/2017     10/06/2017     Urine dip negative    PVR 28mL by bladder scan    Assessment and Plan: Dara Lamb is a(n) 62 year old female with a several year history of urinary urgency and urge incontinence.  This has been progressively worsening over time.  She also has noted pelvic floor dysfunction.    -Initially, we will start with referral for dedicated pelvic floor physical therapy.    -Return to clinic in 3 months to reevaluate symptoms.  -If symptoms are not improving, will consider cystourethroscopy and or urodynamics based on the patient's endorsed history of gross hematuria as well as her lengthy " abdominal and spinal surgical history.  -Patient will contact her ophthalmologist to discuss whether anticholinergics would be okay    This patient's exam findings, labs, and imaging discussed with urology staff surgeon Dr. Prescott, who developed the treatment plan and who saw the patient with me.    Addendum:    The patient was seen and evaluated with the resident.  The plan was formulated in conjunction with me and I agree with the above note with changes made as necessary.    We discussed that urge incontinence treatments include observation, weight loss, medications most commonly anticholinergics, physical therapy, biofeedback, intravesical botulinum toxin, percutaneous tibial nerve stimulation and sacral neuromodulation.   At this time she does have a history of glaucoma she will need a note from her opthamologist prior to starting any anticholinergics.  At this time will plan to start with dedicated pelvic floor therapy.  We discussed how this works and she is agreeable.    At this time given her symptoms and history of a single episode of gross hematuria in the past, if there is not improvement would consider cystoscopy at next visit and possible urodynamics thereafter given her extensive surgical history.    RTC 3 months for possible cystoscopy    30 minutes were spent with patient today, >50% in counseling and coordination of care      Again, thank you for allowing me to participate in the care of your patient.      Sincerely,    Bridget Prescott MD

## 2018-01-25 NOTE — PROGRESS NOTES
Urology Consult    Name:  Dara Lamb  MRN:  [id number]  Age/: 62 year old, 1955    CC: Urinary Urgency & incontinence    HPI: Dara Lamb is a(n) 62 year old female referred by Rosenda Easley MD for evaluation of urinary urgency & urge incontinence.  Patient notes that her incontinence has been present for several years.  She notes that she has urinary urgency and occasional urge incontinence.  This has been worsening over the past few years.  She will note that when she has to void and does not make it to the bathroom, she will often empty her entire bladder.  She occasionally uses pads and she occasionally uses an impressa    Her incontinence is not worsened with activity.  She notes a history of many prior abdominal surgeries.  She has undergone exploration at 6 months of age due to concern for a bowel obstruction; she has undergone  x3.  Several years ago, she had free air in the abdomen and underwent sigmoid colectomy and colostomy with eventual takedown.  She has undergone laminectomy for cauda equina syndrome; she occasionally has numbness in the thighs when walking.  She has never undergone pelvic radiation.    She does endorse a history of gross hematuria several years ago, but has never had any urolithiasis.  She has not had prior hematuria workup.  She endorses no history of prior surgery to the kidneys, ureters, or bladder.  She is sexually active and feels safe at home.  She has no concerns of a urinary tract infection.  She is a non-smoker.  She works as a clinical psychiatrist and outpatient medicine.    Previous interventions include: None  Medications include: None    Past Medical History:  Past Medical History:   Diagnosis Date     Abnormal Pap smear and cervical HPV (human papillomavirus)     DARIO, conization 1990     Acne      Alcohol abuse      Bipolar affective disorder (H)      Breast cancer in female (H) 2011    invasive ductal ca, stage IIB, poorly  differentiated,  ER and AR positive, lumpectomy,      Cerebral aneurysm, nonruptured     ophthalmic branch of left ICA. .  Presented with syncope     Closed Colles' fracture      dermatofibrosarcoma     1998     Diverticulitis of colon (without mention of hemorrhage)(562.11)     2007     Esophageal reflux      Essential hypertension, benign      Glaucoma      Lumbago     right spinal canal cyst through L1-2 neuroforamin with large benign root sleeve cyst[facility]     Major depressive disorder      Major depressive disorder, recurrent episode, unspecified     Suicide attempt 10/2000     Menopause     2004     Migraine without aura, without mention of intractable migraine without mention of status migrainosus      Neoplasm of unspecified nature of breast     3.9 cm Stage 2B IDCa ER/AR +, HER2- 5/3/11right breast     Obstructive sleep apnea (adult) (pediatric)      Other and unspecified hyperlipidemia      Postmenopausal bleeding      biopsy negative     Primary thyroid papillary carcinoma (H)      Rectal bleeding     divert[facility]     Restless legs syndrome (RLS)      Spinal stenosis, lumbar region, without neurogenic claudication     2003 L4-5     Thyroid cancer (H) 2012    Stage OMAR, papillary ca, s/p thyroidectomy and mod left neck dissection     Past Surgical History:  Past Surgical History:   Procedure Laterality Date      SECTION      1978, 87, 97     COLECTOMY LEFT      sigmoid colon , diverticulitis, colostomy  reanastomosis     COLONOSCOPY      2009     LAMINECTOMY LUMBAR ONE LEVEL      for cauda equina syndrome, 2006     LAPAROTOMY EXPLORATORY      , for ?bowel obstx r/o pelvic infection     LUMPECTOMY BREAST      right breast 5/3/11     MAMMOPLASTY REDUCTION BILATERAL      11     THYROIDECTOMY       TONSILLECTOMY      10/26/03     WRIST SURGERY       Allergies:     Allergies   Allergen Reactions      Contrast Dye Shortness Of Breath     Atorvastatin Calcium      myalgias     Dust Mite Extract Other (See Comments)     Sneezing     Dust Mites Other (See Comments)     Sneezing around dogs and cats     Hydrochlorothiazide      Latex      Oxycodone-Acetaminophen Nausea and Vomiting     Sulfa Drugs      arthralgias     Vicodin [Hydrocodone-Acetaminophen]      Nausea, vomiting     Medications:    Current Outpatient Prescriptions on File Prior to Visit:  traMADol-acetaminophen (ULTRACET) 37.5-325 MG per tablet TAKE 1 TO 2 TABLETS BY MOUTH EVERY 6 HOURS AS NEEDED FOR STRONG BACK PAIN. MAX IS 30 TABS IN 30 DAYS.  See doctor for additional refills.   fish oil-omega-3 fatty acids 1000 MG capsule Take 1 capsule by mouth   irbesartan (AVAPRO) 150 MG tablet Take 1 tablet (150 mg) by mouth At Bedtime   pantoprazole (PROTONIX) 40 MG EC tablet Take 1 tablet (40 mg) by mouth daily   rosuvastatin (CRESTOR) 40 MG tablet Take 1 tablet (40 mg) by mouth daily   cyclobenzaprine (FLEXERIL) 10 MG tablet Take 1 tablet (10 mg) by mouth At Bedtime   diltiazem 240 MG 24 hr capsule Take 1 capsule (240 mg) by mouth daily   estradiol (ESTRACE) 0.1 MG/GM vaginal cream Place 2 g vaginally twice a week   latanoprost (XALATAN) 0.005 % ophthalmic solution Place 1 drop into both eyes At Bedtime   Vortioxetine HBr (BRINTELLIX PO) Take 10 mg by mouth daily   vitamin  B complex with vitamin C (STRESS TAB) TABS Take 1 tablet by mouth daily   polyethylene glycol (MIRALAX) powder Take 17 g by mouth daily   hydrOXYzine (ATARAX) 25 MG tablet Take 1-2 tablets (25-50 mg) by mouth every 8 hours as needed for itching   atomoxetine (STRATTERA) 60 MG capsule Take 60 mg by mouth 2 times daily   gabapentin (NEURONTIN) 600 MG tablet Take 600 mg by mouth At Bedtime   levothyroxine (SYNTHROID) 137 MCG tablet Take 137 mcg by mouth daily   lurasidone (LATUDA) 60 MG TABS tablet Take by mouth At Bedtime   desmopressin (DDAVP) 0.2 MG tablet Take 0.2 mg by mouth At Bedtime    lidocaine (LIDODERM) 5 % patch Apply up to 3 patches to painful area at once for up to 12 h within a 24 h period.  Remove after 12 hours. (Patient not taking: Reported on 10/6/2017)   multivitamin, therapeutic with minerals (THERA-VIT-M) TABS Take 1 tablet by mouth daily   QUEtiapine (SEROQUEL XR) 50 MG TB24 Take 75 mg by mouth At Bedtime. Take 1.5 tablets daily.   [DISCONTINUED] losartan (COZAAR) 50 MG tablet Take 1 tablet by mouth daily.   calcium carbonate (OS-RUBEN 500 MG [place]. [place]) 500 MG tablet Take 2 tablets by mouth daily.     No current facility-administered medications on file prior to visit.     Social History:  Social History     Social History     Marital status:      Spouse name: N/A     Number of children: N/A     Years of education: N/A     Occupational History     Not on file.     Social History Main Topics     Smoking status: Never Smoker     Smokeless tobacco: Never Used     Alcohol use No     Drug use: No     Sexual activity: Not on file     Other Topics Concern     Not on file     Social History Narrative    .  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  Has significant other who she sees on occation. Three children all with ADD.  Son with autism.  Daughter attending [employer]. No tobacco use.  Previous excessive alcohol use, now in remission.    16       Family History:  Family History   Problem Relation Age of Onset     Prostate Cancer Father      Alcohol/Drug Father      Alcohol/Drug Brother      Psychotic Disorder Son      autism     Psychotic Disorder       ADD (3 children)     C.A.D. Brother      2 MI's with stents     Psychotic Disorder Sister      depression     Psychotic Disorder Brother      depression     Lipids Father      Hypertension Mother      Blood Disease Maternal Grandmother      DVT, PE     CANCER Maternal Grandmother      HEART DISEASE Brother      Aortic and mitral  "valve replacement     Asthma Sister      CEREBROVASCULAR DISEASE Father      incidental finding on MRI     ROS:  Review of Systems     HENT:  Negative for ear pain, hearing loss, tinnitus, nosebleeds, trouble swallowing, hoarse voice, mouth sores, sore throat, ear discharge, tooth pain, gum tenderness, taste disturbance, smell disturbance, hearing aid, bleeding gums, dry mouth, sinus pain, sinus congestion and neck mass.    Eyes:  Negative for double vision, pain, redness, eye pain, decreased vision, eye watering, eye bulging, eye dryness, flashing lights, spots, floaters, strabismus, tunnel vision, jaundice and eye irritation.   Respiratory:   Negative for cough, hemoptysis, sputum production, shortness of breath, wheezing, sleep disturbances due to breathing, snores loudly, respiratory pain, dyspnea on exertion, cough disturbing sleep and postural dyspnea.    Cardiovascular:  Negative for chest pain, dyspnea on exertion, palpitations, orthopnea, claudication, leg swelling, fingers/toes turn blue, hypertension, hypotension, syncope, history of heart murmur, chest pain on exertion, chest pain at rest, pacemaker, few scattered varicosities, leg pain, sleep disturbances due to breathing, tachycardia, light-headedness, exercise intolerance and edema.   Gastrointestinal:  Negative for heartburn, nausea, vomiting, abdominal pain, diarrhea, constipation, blood in stool, melena, rectal pain, bloating, hemorrhoids, bowel incontinence, jaundice, rectal bleeding, coffee ground emesis and change in stool.   Skin:  Negative for nail changes, itching, poor wound healing, rash, hair changes, skin changes, acne, warts, poor wound healing, scarring, flaky skin, Raynaud's phenomenon, sensitivity to sunlight and skin thickening.   Neurological:  Negative for light-headedness.   Endocrine:  Negative for unwanted hair growth and change in facial hair.    Exam:  /86  Pulse 96  Ht 1.613 m (5' 3.5\")  Wt 94.5 kg (208 lb 4.8 oz)  " BMI 36.32 kg/m2  General: Alert, interactive, & in NAD; pleasant  Resp: Breathing is non-labored on room air   Cardiac: Extremities warm  Abdomen: Soft, non-tender, nondistended. Surgical scars present without hernias.  : Normal external female genitalia.  Negative ESST.  Pelvic exam remarkable for diffuse myofascial tenderness of the pelvic floor  Extremities: No LE edema or obvious joint abnormalities  Skin: Warm and dry, no jaundice or rash    Labs:  All laboratory data reviewed       Lab Results   Component Value Date     10/06/2017    Lab Results   Component Value Date    CHLORIDE 107 10/06/2017    Lab Results   Component Value Date    BUN 9 10/06/2017      Lab Results   Component Value Date    POTASSIUM 3.8 10/06/2017    Lab Results   Component Value Date    CO2 27 10/06/2017    Lab Results   Component Value Date    CR 0.65 10/06/2017        Lab Results   Component Value Date    WBC 4.8 10/06/2017    HGB 13.2 10/06/2017    HCT 40.2 10/06/2017    MCV 94 10/06/2017     10/06/2017     Urine dip negative    PVR 28mL by bladder scan    Assessment and Plan: Dara Lamb is a(n) 62 year old female with a several year history of urinary urgency and urge incontinence.  This has been progressively worsening over time.  She also has noted pelvic floor dysfunction.    -Initially, we will start with referral for dedicated pelvic floor physical therapy.    -Return to clinic in 3 months to reevaluate symptoms.  -If symptoms are not improving, will consider cystourethroscopy and or urodynamics based on the patient's endorsed history of gross hematuria as well as her lengthy abdominal and spinal surgical history.  -Patient will contact her ophthalmologist to discuss whether anticholinergics would be okay    This patient's exam findings, labs, and imaging discussed with urology staff surgeon Dr. Prescott, who developed the treatment plan and who saw the patient with me.    Addendum:    The patient was seen and  evaluated with the resident.  The plan was formulated in conjunction with me and I agree with the above note with changes made as necessary.    We discussed that urge incontinence treatments include observation, weight loss, medications most commonly anticholinergics, physical therapy, biofeedback, intravesical botulinum toxin, percutaneous tibial nerve stimulation and sacral neuromodulation.   At this time she does have a history of glaucoma she will need a note from her opthamologist prior to starting any anticholinergics.  At this time will plan to start with dedicated pelvic floor therapy.  We discussed how this works and she is agreeable.    At this time given her symptoms and history of a single episode of gross hematuria in the past, if there is not improvement would consider cystoscopy at next visit and possible urodynamics thereafter given her extensive surgical history.    RTC 3 months for possible cystoscopy    30 minutes were spent with patient today, >50% in counseling and coordination of care    Bridget Prescott MD MPH   of Urology

## 2018-01-25 NOTE — PATIENT INSTRUCTIONS
Please contact your eye doctor about the anticholinergic.  [phone] is our fax number for your eye doctor to fax us a okay for you to take an anticholinergic medication    Websites with free information:    [employer] patient website: www.voicesforpfd.org    Total Control Program: www.totalcontrolprogram.com    Please see one of the dedicated pelvic floor physical therapist ([facility] [phone])    Please return to see me in 3 months for possible cystoscopy, sooner if needed    It was a pleasure meeting with you today.  Thank you for allowing me and my team the privilege of caring for you today.  YOU are the reason we are here, and I truly hope we provided you with the excellent service you deserve.  Please let us know if there is anything else we can do for you so that we can be sure you are leaving completely satisfied with your care experience.

## 2018-01-25 NOTE — NURSING NOTE
"Chief Complaint   Patient presents with     Consult     Incontinence       Blood pressure 123/86, pulse 96, height 1.613 m (5' 3.5\"), weight 94.5 kg (208 lb 4.8 oz), not currently breastfeeding. Body mass index is 36.32 kg/(m^2).    Patient Active Problem List   Diagnosis     Esophageal reflux     Abnormal Pap smear and cervical HPV (human papillomavirus)     Breast neoplasm     Cerebral aneurysm, nonruptured     Migraine without aura     Malignant neoplasm of connective and other soft tissue     Diverticulitis of colon     Hyperlipidemia     Essential hypertension, benign     Lumbago     Spinal stenosis, lumbar region, without neurogenic claudication     Obstructive sleep apnea     Restless legs syndrome (RLS)     Primary thyroid papillary carcinoma (H)     Bipolar affective disorder (H)     Glaucoma     Class 2 obesity due to excess calories with body mass index (BMI) of 36.0 to 36.9 in adult, unspecified whether serious comorbidity present     Gastroesophageal reflux disease without esophagitis     Essential hypertension     Low back pain, unspecified back pain laterality, unspecified chronicity, with sciatica presence unspecified     Breast cancer in female (H)       Allergies   Allergen Reactions     Contrast Dye Shortness Of Breath     Atorvastatin Calcium      myalgias     Dust Mite Extract Other (See Comments)     Sneezing     Dust Mites Other (See Comments)     Sneezing around dogs and cats     Hydrochlorothiazide      Latex      Oxycodone-Acetaminophen Nausea and Vomiting     Sulfa Drugs      arthralgias     Vicodin [Hydrocodone-Acetaminophen]      Nausea, vomiting       Current Outpatient Prescriptions   Medication Sig Dispense Refill     vortioxetine (TRINTELLIX) 20 MG tablet 20 mg       traMADol-acetaminophen (ULTRACET) 37.5-325 MG per tablet TAKE 1 TO 2 TABLETS BY MOUTH EVERY 6 HOURS AS NEEDED FOR STRONG BACK PAIN. MAX IS 30 TABS IN 30 DAYS.  See doctor for additional refills. 30 tablet 0     fish " oil-omega-3 fatty acids 1000 MG capsule Take 1 capsule by mouth       irbesartan (AVAPRO) 150 MG tablet Take 1 tablet (150 mg) by mouth At Bedtime 90 tablet 3     pantoprazole (PROTONIX) 40 MG EC tablet Take 1 tablet (40 mg) by mouth daily 90 tablet 3     rosuvastatin (CRESTOR) 40 MG tablet Take 1 tablet (40 mg) by mouth daily 90 tablet 3     cyclobenzaprine (FLEXERIL) 10 MG tablet Take 1 tablet (10 mg) by mouth At Bedtime 90 tablet 1     diltiazem 240 MG 24 hr capsule Take 1 capsule (240 mg) by mouth daily 90 capsule 3     estradiol (ESTRACE) 0.1 MG/GM vaginal cream Place 2 g vaginally twice a week       latanoprost (XALATAN) 0.005 % ophthalmic solution Place 1 drop into both eyes At Bedtime       vitamin  B complex with vitamin C (STRESS TAB) TABS Take 1 tablet by mouth daily 90 tablet 1     polyethylene glycol (MIRALAX) powder Take 17 g by mouth daily 119 g 2     hydrOXYzine (ATARAX) 25 MG tablet Take 1-2 tablets (25-50 mg) by mouth every 8 hours as needed for itching 10 tablet 0     atomoxetine (STRATTERA) 60 MG capsule Take 60 mg by mouth 2 times daily       gabapentin (NEURONTIN) 600 MG tablet Take 600 mg by mouth At Bedtime       levothyroxine (SYNTHROID) 137 MCG tablet Take 137 mcg by mouth daily       lurasidone (LATUDA) 60 MG TABS tablet Take by mouth At Bedtime       desmopressin (DDAVP) 0.2 MG tablet Take 0.2 mg by mouth At Bedtime       multivitamin, therapeutic with minerals (THERA-VIT-M) TABS Take 1 tablet by mouth daily 90 each 3     QUEtiapine (SEROQUEL XR) 50 MG TB24 Take 75 mg by mouth At Bedtime. Take 1.5 tablets daily.       [DISCONTINUED] losartan (COZAAR) 50 MG tablet Take 1 tablet by mouth daily. 30 tablet 11     calcium carbonate (OS-RUBEN 500 MG [place]. [place]) 500 MG tablet Take 2 tablets by mouth daily. 60 tablet 0       Social History   Substance Use Topics     Smoking status: Never Smoker     Smokeless tobacco: Never Used     Alcohol use No       SARAI Pavon  1/25/2018  7:36 AM

## 2018-01-30 ASSESSMENT — ENCOUNTER SYMPTOMS
COUGH: 0
BLOOD IN STOOL: 0
SINUS CONGESTION: 0
HYPOTENSION: 0
VOMITING: 0
DYSPNEA ON EXERTION: 0
DIARRHEA: 0
NAIL CHANGES: 0
TACHYCARDIA: 0
BLOATING: 0
EYE PAIN: 0
WHEEZING: 0
TROUBLE SWALLOWING: 0
COUGH DISTURBING SLEEP: 0
EYE IRRITATION: 0
EYE REDNESS: 0
LIGHT-HEADEDNESS: 0
RECTAL BLEEDING: 0
SMELL DISTURBANCE: 0
EXERCISE INTOLERANCE: 0
NAUSEA: 0
SHORTNESS OF BREATH: 0
RESPIRATORY PAIN: 0
RECTAL PAIN: 0
CONSTIPATION: 0
HEARTBURN: 0
EYE WATERING: 0
SLEEP DISTURBANCES DUE TO BREATHING: 0
SPUTUM PRODUCTION: 0
HYPERTENSION: 0
CLAUDICATION: 0
PALPITATIONS: 0
JAUNDICE: 0
SYNCOPE: 0
LEG SWELLING: 0
ABDOMINAL PAIN: 0
SKIN CHANGES: 0
SINUS PAIN: 0
ORTHOPNEA: 0
POSTURAL DYSPNEA: 0
BOWEL INCONTINENCE: 0
HEMOPTYSIS: 0
DOUBLE VISION: 0
SNORES LOUDLY: 0
LEG PAIN: 0
SORE THROAT: 0
NECK MASS: 0
POOR WOUND HEALING: 0
TASTE DISTURBANCE: 0
HOARSE VOICE: 0

## 2018-02-02 ENCOUNTER — HOSPITAL ENCOUNTER (OUTPATIENT)
Dept: PHYSICAL MEDICINE AND REHAB | Facility: CLINIC | Age: 63
Discharge: HOME | End: 2018-02-02
Attending: PHYSICAL MEDICINE & REHABILITATION

## 2018-02-02 DIAGNOSIS — M47.816 LUMBAR SPONDYLOSIS: ICD-10-CM

## 2018-02-28 DIAGNOSIS — M54.5 CHRONIC LOW BACK PAIN, UNSPECIFIED BACK PAIN LATERALITY, WITH SCIATICA PRESENCE UNSPECIFIED: ICD-10-CM

## 2018-02-28 DIAGNOSIS — G89.29 CHRONIC LOW BACK PAIN, UNSPECIFIED BACK PAIN LATERALITY, WITH SCIATICA PRESENCE UNSPECIFIED: ICD-10-CM

## 2018-03-01 NOTE — TELEPHONE ENCOUNTER
Patient needs to make an appointment with me specifically to discuss chronic pain management.  I will write an order for both the rx and for her to have a urine tox screen before her visit with me.  Please let her know the plan.  Thank you.  SB-   per Dr Easley 11/2017    Left message for pt to call back.  Tori Bettencourt RN 10:24 AM on 3/6/2018.

## 2018-03-01 NOTE — TELEPHONE ENCOUNTER
traMADol-acetaminophen (ULTRACET) 37.5-325 MG per tablet   Last Written Prescription Date:  11/30/17  Last Fill Quantity: 30,   # refills: 0  Last Office Visit : 10/6/17  Future Office visit: none      Routing because:  controlled

## 2018-03-06 DIAGNOSIS — I10 ESSENTIAL HYPERTENSION, BENIGN: ICD-10-CM

## 2018-03-06 RX ORDER — DILTIAZEM HYDROCHLORIDE 240 MG/1
240 CAPSULE, EXTENDED RELEASE ORAL DAILY
Qty: 90 CAPSULE | Refills: 1 | Status: SHIPPED | OUTPATIENT
Start: 2018-03-06 | End: 2018-09-05

## 2018-04-18 ENCOUNTER — PRE VISIT (OUTPATIENT)
Dept: UROLOGY | Facility: CLINIC | Age: 63
End: 2018-04-18

## 2018-04-18 NOTE — TELEPHONE ENCOUNTER
Reason for visit: cystoscopy     Relevant information: pt was referred to physical therapy, but has not started     Records/imaging/labs: NA    Pt called: Yes, message left asking pt to reschedule if she is still planning on starting physical therapy    Rooming: Regular

## 2018-04-19 ENCOUNTER — TELEPHONE (OUTPATIENT)
Dept: UROLOGY | Facility: CLINIC | Age: 63
End: 2018-04-19

## 2018-04-19 NOTE — TELEPHONE ENCOUNTER
Patient called and is having abdominal pain and microscopic blood in urine  She is going to OB-gyn this afternoon to discuss other issues and these as well. I suggested she keep her appointment with dr hwang on Thursday next week even though she has not gone to pelvic floor therapy at all yet. Leslie Dasilva LPN Staff Nurse

## 2018-04-26 ENCOUNTER — OFFICE VISIT (OUTPATIENT)
Dept: UROLOGY | Facility: CLINIC | Age: 63
End: 2018-04-26
Payer: COMMERCIAL

## 2018-04-26 VITALS
SYSTOLIC BLOOD PRESSURE: 124 MMHG | HEIGHT: 64 IN | DIASTOLIC BLOOD PRESSURE: 79 MMHG | BODY MASS INDEX: 35.99 KG/M2 | WEIGHT: 210.8 LBS | HEART RATE: 96 BPM

## 2018-04-26 DIAGNOSIS — R31.29 MICROSCOPIC HEMATURIA: ICD-10-CM

## 2018-04-26 DIAGNOSIS — M62.89 PELVIC FLOOR DYSFUNCTION: ICD-10-CM

## 2018-04-26 DIAGNOSIS — R39.15 URINARY URGENCY: Primary | ICD-10-CM

## 2018-04-26 ASSESSMENT — PAIN SCALES - GENERAL
PAINLEVEL: NO PAIN (0)
PAINLEVEL: NO PAIN (0)

## 2018-04-26 NOTE — MR AVS SNAPSHOT
"              After Visit Summary   4/26/2018    Dara Lamb    MRN: [id number]           Patient Information     Date Of Birth          1955        Visit Information        Provider Department      4/26/2018 7:30 AM Bridget Prescott MD [facility] and [facility] for Prostate and Urologic Cancers        Today's Diagnoses     Urinary urgency    -  1    Microscopic hematuria        Pelvic floor dysfunction          Care Instructions    Websites with free information:    American Urogynecologic Society patient website: www.voicesforpfd.org    Total Control Program: www.totalcontrolprogram.com    Please see one of the dedicated pelvic floor physical therapist ([email or web address] [phone])    Please return to see me in 4 months, sooner if needed    Dr. Solano is our ED specialist.    It was a pleasure meeting with you today.  Thank you for allowing me and my team the privilege of caring for you today.  YOU are the reason we are here, and I truly hope we provided you with the excellent service you deserve.  Please let us know if there is anything else we can do for you so that we can be sure you are leaving completely satisfied with your care experience.        AFTER YOUR CYSTOSCOPY        You have just completed a cystoscopy, or \"cysto\", which allowed your physician to learn more about your bladder (or to remove a stent placed after surgery). We suggest that you continue to avoid caffeine, fruit juice, and alcohol for the next 24 hours, however, you are encouraged to return to your normal activities.         A few things that are considered normal after your cystoscopy:     * Small amount of bleeding (or spotting) that clears within the next 24 hours     * Slight burning sensation with urination     * Sensation to of needing to avoid more frequently     * The feeling of \"air\" in your urine     * Mild discomfort that is relieved with Tylenol        Please contact our office " "promptly if you:     * Develop a fever above 101 degrees     * Are unable to urinate     * Develop bright red blood that does not stop     * Severe pain or swelling         Please contact our office with any concerns or questions @[phone]          Follow-ups after your visit        Who to contact     Please call your clinic at [phone] to:    Ask questions about your health    Make or cancel appointments    Discuss your medicines    Learn about your test results    Speak to your doctor            Additional Information About Your Visit        [employer] Information     [employer] is an electronic gateway that provides easy, online access to your medical records. With [employer], you can request a clinic appointment, read your test results, renew a prescription or communicate with your care team.     To sign up for [employer] visit the website at [email or web address]   You will be asked to enter the access code listed below, as well as some personal information. Please follow the directions to create your username and password.     Your access code is: [email or web address]  Expires: 2018  6:31 AM     Your access code will  in 90 days. If you need help or a new code, please contact your [facility] or call [phone] for assistance.        Care EveryWhere ID     This is your Care EveryWhere ID. This could be used by other organizations to access your [place] medical records  [id number]        Your Vitals Were     Pulse Height BMI (Body Mass Index)             96 1.613 m (5' 3.5\") 36.76 kg/m2          Blood Pressure from Last 3 Encounters:   18 124/79   18 123/86   10/06/17 115/79    Weight from Last 3 Encounters:   18 95.6 kg (210 lb 12.8 oz)   18 94.5 kg (208 lb 4.8 oz)   10/06/17 96 kg (211 lb 9.6 oz)              We Performed the Following     CYSTOURETHROSCOPY        Primary Care Provider Office Phone # Fax #    Rosenda Easley [phone] " [phone]       [address] [place] [phone]  [facility] [id number]        Equal Access to Services     OG RAMSEY : Hadii deandre mims clara Velasquez, waivoneda luqadaha, qaybta kaalmada mirta, damaris lashellin hayaashabnam colontruman contreras devendra maddox. So [facility] [phone].    ATENCIÓN: Si habla español, tiene a almanza disposición servicios gratuitos de asistencia lingüística. Kathy al [phone].    We comply with applicable federal civil rights laws and [place] laws. We do not discriminate on the basis of race, color, national origin, age, disability, sex, sexual orientation, or gender identity.            Thank you!     Thank you for choosing [facility] UROLOGY AND [facility] FOR PROSTATE AND UROLOGIC CANCERS  for your care. Our goal is always to provide you with excellent care. Hearing back from our patients is one way we can continue to improve our services. Please take a few minutes to complete the written survey that you may receive in the mail after your visit with us. Thank you!             Your Updated Medication List - Protect others around you: Learn how to safely use, store and throw away your medicines at www.disposemymeds.org.          This list is accurate as of 4/26/18  8:27 AM.  Always use your most recent med list.                   Brand Name Dispense Instructions for use Diagnosis    calcium carbonate 500 tablet    OS-RUBEN 500 mg [place]. [place]    60 tablet    Take 2 tablets by mouth daily.    Menopause       cyclobenzaprine 10 MG tablet    FLEXERIL    90 tablet    Take 1 tablet (10 mg) by mouth At Bedtime    Low back pain, unspecified back pain laterality, unspecified chronicity, with sciatica presence unspecified       desmopressin 0.2 MG tablet    DDAVP     Take 0.2 mg by mouth At Bedtime        diltiazem 240 MG 24 hr capsule     90 capsule    Take 1 capsule (240 mg) by mouth daily    Essential hypertension, benign       estradiol 0.1 MG/GM cream    ESTRACE     Place 2 g vaginally twice a week        fish oil-omega-3 fatty  acids 1000 MG capsule      Take 1 capsule by mouth        gabapentin 600 MG tablet    NEURONTIN     Take 600 mg by mouth At Bedtime        irbesartan 150 MG tablet    AVAPRO    90 tablet    Take 1 tablet (150 mg) by mouth At Bedtime    Essential hypertension       latanoprost 0.005 % ophthalmic solution    XALATAN     Place 1 drop into both eyes At Bedtime        LATUDA 60 MG Tabs tablet   Generic drug:  lurasidone      Take by mouth At Bedtime        multivitamin, therapeutic with minerals Tabs tablet     90 each    Take 1 tablet by mouth daily    Esophageal reflux       pantoprazole 40 MG EC tablet    PROTONIX    90 tablet    Take 1 tablet (40 mg) by mouth daily    Gastroesophageal reflux disease without esophagitis       polyethylene glycol powder    MIRALAX    119 g    Take 17 g by mouth daily    Constipation       rosuvastatin 40 MG tablet    CRESTOR    90 tablet    Take 1 tablet (40 mg) by mouth daily    Hyperlipidemia LDL goal <100       SEROQUEL XR 50 MG Tb24 24 hr tablet   Generic drug:  QUEtiapine      Take 75 mg by mouth At Bedtime. Take 1.5 tablets daily.        STRATTERA 60 MG capsule   Generic drug:  atomoxetine      Take 60 mg by mouth 2 times daily        SYNTHROID 137 MCG tablet   Generic drug:  levothyroxine      Take 137 mcg by mouth daily        traMADol-acetaminophen 37.5-325 MG per tablet    ULTRACET    30 tablet    TAKE 1 TO 2 TABLETS BY MOUTH EVERY 6 HOURS AS NEEDED FOR STRONG BACK PAIN. MAX IS 30 TABS IN 30 DAYS.  See doctor for additional refills.    Chronic low back pain, unspecified back pain laterality, with sciatica presence unspecified       TRINTELLIX 20 MG tablet   Generic drug:  vortioxetine      20 mg        vitamin B complex with vitamin C Tabs tablet     90 tablet    Take 1 tablet by mouth daily    Gastroesophageal reflux disease without esophagitis

## 2018-04-26 NOTE — LETTER
"4/26/2018     RE: Dara Lamb  [address] [facility]   [address] [place] [id number]     Dear Colleague,    Thank you for referring your patient, Dara Lamb, to the [facility] UROLOGY AND INST FOR PROSTATE AND UROLOGIC CANCERS at [facility]. Please see a copy of my visit note below.    April 26, 2018    Return visit    Patient returns today for follow up.  Has not been able to get to pelvic floor therapy. She denies any changes in her health since last visit.  She inquires who to send her partner to for ED.    /79  Pulse 96  Ht 1.613 m (5' 3.5\")  Wt 95.6 kg (210 lb 12.8 oz)  BMI 36.76 kg/m2  She is comfortable, in no distress, non-labored breathing.  Abdomen is soft, non-tender, non-distended.  Normal external female genitalia.  Negative CST.  Pelvic exam is remarkable for myofascial tenderness of the pelvic floor.    Cystoscopy Note: After informed consent was obtained patient was prepped and draped in the standard fashion.  The flexible cystoscope was inserted into a normal appearing urethral meatus.  The urothelium was carefully examined and there were no tumors, masses, stones, foreign bodies, or other urothelial abnormalities noted.  Bilateral ureteral orifices were noted in the normal orthotopic position and both effluxed clear urine.  The cystoscope was retroflexed and the bladder neck was unremarkable.  The urethra was carefully examined upon removing the cystoscope and was unremarkable.  Patient tolerated the procedure without complications noted.      A/P: 63 year old F with history of microscopic hematuria s/p negative urologic evaluation, urinary urgency, urge incontinence, pelvic floor dysfunction    Cytology to complete evaluation    Pelvic floor therapy    RTC 4 months, sooner if needed    Bridget Prescott MD MPH   of Urology    CC  Patient Care Team:  Rsoenda Easley MD as PCP - General (Internal Medicine)  Alvaro Stanley MD as " MD (Student in organized health care education/training program)  Bridget Prescott MD as MD (Urology)  Daisy Brown, RN as Registered Nurse (Urology)  SOFY OJEDA

## 2018-04-26 NOTE — NURSING NOTE
The following medication was given:     MEDICATION:  Uro-jet  ROUTE: topical   SITE: urethral meatus  DOSE: 10 mL  Was there drug waste? No      Rachana Green CMA  April 26, 2018

## 2018-04-26 NOTE — LETTER
May 14, 2018       TO: Dara Lamb  [address] [id number]       DearMs.Adonis,    We are writing to inform you of your test results.    Your test results fall within the expected range(s) or remain unchanged from previous results.  Please continue with current treatment plan.    Resulted Orders   Cytology non gyn   Result Value Ref Range    Copath Report       Patient Name: DARA LAMB  MR#: [id number]  Specimen #: [id number]  Collected: 4/26/2018  Received: 4/26/2018  Reported: 4/27/2018 13:05  Ordering Phy(s): WILLIAM MIRANDA  Additional Phy(s): SOFY OJEDA    For improved result formatting, select 'View Enhanced Report Format' under   Linked Documents section.    SPECIMEN/STAIN PROCESS:  Urine-voided       Pap-Cyto x 1    ----------------------------------------------------------------    CYTOLOGIC INTERPRETATION:    Urine-voided:   Negative for High-Grade Urothelial Carcinoma.  Crystals and numerous squamous cells present.  Specimen Adequacy: Satisfactory for evaluation.    I have personally reviewed all specimens and/or slides, including the   listed special stains, and used them  with my medical judgement to determine or confirm the final diagnosis.    Electronically signed out by:  Bijan Colon M.D., [facility]    Processed and screened at [facility]    C LINICAL HISTORY:  The patient is a 63-year-old female with microscopic hematuria.    ,    GROSS:  Urine-voided: Received 80 ml of orange, clear fluid, processed as 1 Pap   stained Auto[employer].    MICROSCOPIC:  Microscopic examination performed.    Kar Vaca MD, Cytopathology Fellow          Bijan Colon MD    CPT Codes:  A: 15054-GTBEVSC    TESTING LAB LOCATION:  [facility], [address]   [id number] [phone]    COLLECTION SITE:  Client:  [facility]  [facility], [place]  Location:  UCUROP (B)    Resident  [id number]         Thank you,

## 2018-04-26 NOTE — NURSING NOTE
Invasive Procedure Safety Checklist:    Procedure:     Action: Complete sections and checkboxes as appropriate.    Pre-procedure:  1. Patient ID Verified with 2 identifiers (Myrna and  or MRN) : YES    2. Procedure and site verified with patient/designee (when able) : YES    3. Accurate consent documentation in medical record : YES    4. H&P (or appropriate assessment) documented in medical record : YES  H&P must be up to 30 days prior to procedure an updated within 24 hours of                 Procedure as applicable.     5. Relevant diagnostic and radiology test results appropriately labeled and displayed as applicable : YES    6. Blood products, implants, devices, and/or special equipment available for the procedure as applicable : YES    7. Procedure site(s) marked with provider initials [Exclusions: None] : NO    8. Marking not required. Reason : Yes  Procedure does not require site marking    Time Out:     Time-Out performed immediately prior to starting procedure, including verbal and active participation of all team members addressing: YES    1. Correct patient identity.  2. Confirmed that the correct side and site are marked.  3. An accurate procedure to be done.  4. Agreement on the procedure to be done.  5. Correct patient position.  6. Relevant images and results are properly labeled and appropriately displayed.  7. The need to administer antibiotics or fluids for irrigation purposes during the procedure as applicable.  8. Safety precautions based on patient history or medication use.    During Procedure: Verification of correct person, site, and procedure occurs any time the responsibility for care of the patient is transferred to another member of the care team.

## 2018-04-26 NOTE — PATIENT INSTRUCTIONS
"Websites with free information:    American Urogynecologic Society patient website: www.voicesforpfd.org    Total Control Program: www.totalcontrolprogram.com    Please see one of the dedicated pelvic floor physical therapist ([facility] [phone])    Please return to see me in 4 months, sooner if needed    Dr. Solano is our ED specialist.    It was a pleasure meeting with you today.  Thank you for allowing me and my team the privilege of caring for you today.  YOU are the reason we are here, and I truly hope we provided you with the excellent service you deserve.  Please let us know if there is anything else we can do for you so that we can be sure you are leaving completely satisfied with your care experience.        AFTER YOUR CYSTOSCOPY        You have just completed a cystoscopy, or \"cysto\", which allowed your physician to learn more about your bladder (or to remove a stent placed after surgery). We suggest that you continue to avoid caffeine, fruit juice, and alcohol for the next 24 hours, however, you are encouraged to return to your normal activities.         A few things that are considered normal after your cystoscopy:     * Small amount of bleeding (or spotting) that clears within the next 24 hours     * Slight burning sensation with urination     * Sensation to of needing to avoid more frequently     * The feeling of \"air\" in your urine     * Mild discomfort that is relieved with Tylenol        Please contact our office promptly if you:     * Develop a fever above 101 degrees     * Are unable to urinate     * Develop bright red blood that does not stop     * Severe pain or swelling         Please contact our office with any concerns or questions @[phone]  "

## 2018-04-26 NOTE — PROGRESS NOTES
"April 26, 2018    Return visit    Patient returns today for follow up.  Has not been able to get to pelvic floor therapy. She denies any changes in her health since last visit.  She inquires who to send her partner to for ED.    /79  Pulse 96  Ht 1.613 m (5' 3.5\")  Wt 95.6 kg (210 lb 12.8 oz)  BMI 36.76 kg/m2  She is comfortable, in no distress, non-labored breathing.  Abdomen is soft, non-tender, non-distended.  Normal external female genitalia.  Negative CST.  Pelvic exam is remarkable for myofascial tenderness of the pelvic floor.    Cystoscopy Note: After informed consent was obtained patient was prepped and draped in the standard fashion.  The flexible cystoscope was inserted into a normal appearing urethral meatus.  The urothelium was carefully examined and there were no tumors, masses, stones, foreign bodies, or other urothelial abnormalities noted.  Bilateral ureteral orifices were noted in the normal orthotopic position and both effluxed clear urine.  The cystoscope was retroflexed and the bladder neck was unremarkable.  The urethra was carefully examined upon removing the cystoscope and was unremarkable.  Patient tolerated the procedure without complications noted.      A/P: 63 year old F with history of microscopic hematuria s/p negative urologic evaluation, urinary urgency, urge incontinence, pelvic floor dysfunction    Cytology to complete evaluation    Pelvic floor therapy    RTC 4 months, sooner if needed    Bridget Prescott MD MPH   of Urology    CC  Patient Care Team:  Rosenda Ojeda MD as PCP - General (Internal Medicine)  Alvaro Stanley MD as MD (Student in organized health care education/training program)  Bridget Prescott MD as MD (Urology)  Daisy Brown, RN as Registered Nurse (Urology)  ROSENDA OJEDA                "

## 2018-04-27 LAB — COPATH REPORT: NORMAL

## 2018-05-14 ENCOUNTER — TELEPHONE (OUTPATIENT)
Dept: UROLOGY | Facility: CLINIC | Age: 63
End: 2018-05-14

## 2018-05-14 NOTE — TELEPHONE ENCOUNTER
I attempted to reach patient to notify her that her urine cytology was negative. I did not leave a message, letter sent.

## 2018-05-31 ENCOUNTER — TELEPHONE (OUTPATIENT)
Dept: INTERNAL MEDICINE | Facility: CLINIC | Age: 63
End: 2018-05-31

## 2018-05-31 NOTE — TELEPHONE ENCOUNTER
[facility]    Phone Message    May a detailed message be left on voicemail: yes    Reason for Call: Order(s): Other:   Reason for requested: Colonoscopy order - Per pt, wanted special request: since she is a hard stick, would like special request to have procedure done in OR. Would like orders sent to [facility]. Please follow up.  Date needed: 6/1/2018  Provider name: Dr. Easley      Action Taken: Message routed to:  [facility] LUCAS

## 2018-05-31 NOTE — TELEPHONE ENCOUNTER
I don't see that patient is due for a colonoscopy until 02/09/2019.  I called and left a message at patient's  to return call.    KIAN LOVE at 12:47 PM on 5/31/2018.

## 2018-06-01 ENCOUNTER — THERAPY VISIT (OUTPATIENT)
Dept: PHYSICAL THERAPY | Facility: CLINIC | Age: 63
End: 2018-06-01
Payer: COMMERCIAL

## 2018-06-01 DIAGNOSIS — N39.41 URGENCY INCONTINENCE: ICD-10-CM

## 2018-06-01 DIAGNOSIS — M99.05 PELVIC SOMATIC DYSFUNCTION: Primary | ICD-10-CM

## 2018-06-01 PROCEDURE — 97530 THERAPEUTIC ACTIVITIES: CPT | Mod: GP | Performed by: PHYSICAL THERAPIST

## 2018-06-01 PROCEDURE — 97161 PT EVAL LOW COMPLEX 20 MIN: CPT | Mod: GP | Performed by: PHYSICAL THERAPIST

## 2018-06-01 PROCEDURE — 97112 NEUROMUSCULAR REEDUCATION: CPT | Mod: GP | Performed by: PHYSICAL THERAPIST

## 2018-06-01 PROCEDURE — 97140 MANUAL THERAPY 1/> REGIONS: CPT | Mod: GP | Performed by: PHYSICAL THERAPIST

## 2018-06-01 NOTE — PROGRESS NOTES
[facility] Initial Evaluation  Subjective:  HPI  SUBJECTIVE:  Patient reports onset of symptoms or urge incontinence ongoing last 4 years.  Leaks about 3 times a day.  Worsened when in Dec 2017, pt had to go to the bathroom and was unable to use the store bathroom.  She ended up having complete accident in her white linen pants.  Patient does have history of LBP with laminectomy in 2005.  Extensive history of 3 C-sections, emergency abdominal surgery/sigmoidectomy requiring colonscopy in 1503-1646.  Symptoms include urge incontinence getting to the toilet.  Since onset symptoms have been getting better, worse or staying the same? Worse to same    Urination:  Do you leak on the way to the bathroom or with a strong urge to void? Yes    Do you leak with cough,sneeze, jumping, running?No   Any other activities that cause leaking? none  Do you have triggers that make you feel you can't wait to go to the bathroom? Yes   what are they drinking fluid.  Type of pad and number used per day? 0  When you leak what is the amount? Small to medium    How long can you delay the need to urinate? 1 - 2 minutes. Reports voids every 2-3 hours  How many times do you get up to urinate at night? 1  Can you stop the flow of urine when on the toilet? Yes  Is the volume of urine passed usually: medium. (8sec rule=  250ml with average bladder storing  400-600ml)    Do you strain to pass urine? No  Do you have a slow or hesitant urinary stream? No  Do you have difficulty initiating the urine stream? No    How many bladder infections have you had in last 12 months?2, last was in Nov 2017    Fluid intake(one glass is 8oz or one cup) 1 glasses/day, 3-4 coffee and diet pepsi caffinated glasses/day  1-2 alcohol glasses/day.    Bowel habits:  Frequency of bowel movements?1-2  times a day  Consistancy of stool? hard, [place] Stool Scale 1,2,3  Do you ignore the urge to defecate? No  Do you strain to pass stool?  sometimes    Pelvic Pain:  When do you have pelvic pain? Rare   Is initial penetration during intercourse painful? Yes  Is deeper penetration painful? Yes  Do you use lubricant? Yes  What kind? Silicone       Given birth? Yes Any complications?no, # of vaginal delieveries?0, # of C-sections?3,.  Are you sexually active?Yes  Have you ever been worried for your physical safety? No  Any abdominal or pelvic surgeries? Sigmoidectomy, reanastomosis, colostomy bag  Are you having any regular exercise?no  Have you practiced the PF(kegel) exercises for 4 or more weeks?no    Marinoff Scale:Level level 2  (Level 3: Abstinence from intercourse because of severe pain. Level 2: Painful intercourse which limites frequency of activity. Level 1: Painful intercourse not severe enough to prevent activity.)                                    Objective:  System                                 Pelvic Dysfunction Evaluation:        Flexibility:    Tightness present at:Adductors; Piriformis and Gluteals      Pelvic Clock Exam:  Pelvic clock exam: tight introitus.        Levator ANI:  ++        External Assessment:    Skin Condition:  Normal    Bearing Down/Coughing:  Normal  Tissue Symmetry:  Normal    Muscle Contraction/Perineal Mobility:  Slight lift, no urogential triangle descent  Internal Assessment:      Contraction/Grade:  Weak squeeze, 2 second hold (2)          SEMG Biofeedback:    Equipment:  MR20    Suraface electrode placement--Perianal:  Yes  Baseline EMG PM:  1.0, very erratic    Peak pelvic muscle contraction:  Endurance hold 3.9 uV, quick flick hold 7.0 uV    EMG interpretation to fatigue:  Less than3 seconds  Position:  Supine                     General     ROS    Assessment/Plan:    Patient is a 63 year old female with pelvic complaints.    Patient has the following significant findings with corresponding treatment plan.                Diagnosis 1:  Urge incontinence  Pain -  manual therapy, self management, education  and home program  Decreased ROM/flexibility - manual therapy, therapeutic exercise, therapeutic activity and home program  Decreased strength - therapeutic exercise, therapeutic activities and home program  Impaired muscle performance - biofeedback, neuro re-education and home program  Decreased function - therapeutic activities and home program  Impaired posture - neuro re-education, therapeutic activities and home program    Therapy Evaluation Codes:   1) History comprised of:   Personal factors that impact the plan of care:      Time since onset of symptoms.    Comorbidity factors that impact the plan of care are:      Cancer, Depression, High blood pressure, Menopausal, Osteoarthritis, Overweight and Sleep disorder/apnea.     Medications impacting care: Anti-depressant, High blood pressure, Muscle relaxant and Sleep.  2) Examination of Body Systems comprised of:   Body structures and functions that impact the plan of care:      Pelvis.   Activity limitations that impact the plan of care are:      Urge incontinence.  3) Clinical presentation characteristics are:   Stable/Uncomplicated.  4) Decision-Making    Low complexity using standardized patient assessment instrument and/or measureable assessment of functional outcome.  Cumulative Therapy Evaluation is: Low complexity.    Previous and current functional limitations:  (See Goal Flow Sheet for this information)    Short term and Long term goals: (See Goal Flow Sheet for this information)     Communication ability:  Patient appears to be able to clearly communicate and understand verbal and written communication and follow directions correctly.  Treatment Explanation - The following has been discussed with the patient:   RX ordered/plan of care  Anticipated outcomes  Possible risks and side effects  This patient would benefit from PT intervention to resume normal activities.   Rehab potential is good.    Frequency:  1 X week, once daily  Duration:  for 8  weeks  Discharge Plan:  Achieve all LTG.  Independent in home treatment program.  Reach maximal therapeutic benefit.    Please refer to the daily flowsheet for treatment today, total treatment time and time spent performing 1:1 timed codes.

## 2018-06-04 NOTE — PROGRESS NOTES
[facility] Initial Evaluation  Subjective:  Patient is a 63 year old female presenting with rehab left ankle/foot hpi.                                      Pertinent medical history includes:  Osteoarthritis, osteoporosis, history of fractures, cancer, overweight, high blood pressure, depression, anemia, sleep disorder/apnea and menopausal.  Medical allergies: yes (latex ).  Other surgeries include:  Cancer surgery, other and orthopedic surgery.  Current medications:  Thyroid medication, sleep medication, muscle relaxants, anti-depressants and high blood pressure medication.  Current occupation is Physician .  Patient is working in normal job without restrictions.  Primary job tasks include:  Prolonged sitting.    Barriers include:  None as reported by patient.    Red flags:  None as reported by patient.                        Objective:  System    Physical Exam    General     ROS    Assessment/Plan:

## 2018-06-07 ENCOUNTER — TELEPHONE (OUTPATIENT)
Dept: UROLOGY | Facility: CLINIC | Age: 63
End: 2018-06-07

## 2018-06-07 NOTE — TELEPHONE ENCOUNTER
Health Call Center    Phone Message    May a detailed message be left on voicemail: yes    Reason for Call: Medication Question or concern regarding medication   Prescription Clarification  Name of Medication: Pt unsure - but Pt said Dr Prescott was going to prescribe her a medication for Urinary Urgency - but Pt needed clearance from her Eye Dr due to her Glaucoma - and her Eye Dr said their are no restrictions and you can go ahead and prescribe her this medication  Prescribing Provider: Dr Prescott   Pharmacy: [facility], [address] [place] [address], Ph # [phone]   What on the order needs clarification? New Rx  See above    Action Taken: Message routed to:  [facility] Urology Clinic

## 2018-06-22 DIAGNOSIS — I10 ESSENTIAL HYPERTENSION, BENIGN: ICD-10-CM

## 2018-06-25 RX ORDER — DILTIAZEM HYDROCHLORIDE 240 MG/1
CAPSULE, COATED, EXTENDED RELEASE ORAL
Qty: 90 CAPSULE | Refills: 1 | OUTPATIENT
Start: 2018-06-25

## 2018-07-18 DIAGNOSIS — E78.5 HYPERLIPIDEMIA LDL GOAL <100: ICD-10-CM

## 2018-07-19 RX ORDER — ROSUVASTATIN CALCIUM 40 MG/1
TABLET, COATED ORAL
Qty: 90 TABLET | Refills: 3 | Status: SHIPPED
Start: 2018-07-19 | End: 2019-02-22

## 2018-09-04 DIAGNOSIS — I10 ESSENTIAL HYPERTENSION: ICD-10-CM

## 2018-09-04 DIAGNOSIS — E78.5 HYPERLIPIDEMIA LDL GOAL <100: ICD-10-CM

## 2018-09-05 DIAGNOSIS — I10 ESSENTIAL HYPERTENSION, BENIGN: ICD-10-CM

## 2018-09-06 RX ORDER — IRBESARTAN 150 MG/1
TABLET ORAL
Qty: 90 TABLET | Refills: 3 | OUTPATIENT
Start: 2018-09-06

## 2018-09-06 RX ORDER — DILTIAZEM HYDROCHLORIDE 240 MG/1
240 CAPSULE, COATED, EXTENDED RELEASE ORAL DAILY
Qty: 90 CAPSULE | Refills: 0 | Status: SHIPPED | OUTPATIENT
Start: 2018-09-06 | End: 2018-11-23

## 2018-09-06 RX ORDER — ROSUVASTATIN CALCIUM 40 MG/1
TABLET, COATED ORAL
Qty: 90 TABLET | Refills: 3 | OUTPATIENT
Start: 2018-09-06

## 2018-09-06 NOTE — TELEPHONE ENCOUNTER
Last Clinic Visit: 10/6/17   Next visit:   None    appt due 10/18.       Scheduling has been notified to contact the pt for appointment.

## 2018-09-06 NOTE — TELEPHONE ENCOUNTER
Called patient and left a message to call back to schedule annual visit with Dr. Easley. Left clinic number for scheduling appointment.

## 2018-09-18 DIAGNOSIS — K21.9 GASTROESOPHAGEAL REFLUX DISEASE WITHOUT ESOPHAGITIS: ICD-10-CM

## 2018-09-21 RX ORDER — PANTOPRAZOLE SODIUM 40 MG/1
40 TABLET, DELAYED RELEASE ORAL DAILY
Qty: 90 TABLET | Refills: 0 | Status: SHIPPED | OUTPATIENT
Start: 2018-09-21 | End: 2018-12-16

## 2018-09-24 DIAGNOSIS — R39.15 URINARY URGENCY: Primary | ICD-10-CM

## 2018-09-24 RX ORDER — SOLIFENACIN SUCCINATE 5 MG/1
5 TABLET, FILM COATED ORAL DAILY
Qty: 30 TABLET | Refills: 3 | Status: SHIPPED | OUTPATIENT
Start: 2018-09-24 | End: 2018-12-30

## 2018-10-01 ENCOUNTER — PRE VISIT (OUTPATIENT)
Dept: UROLOGY | Facility: CLINIC | Age: 63
End: 2018-10-01

## 2018-10-01 NOTE — TELEPHONE ENCOUNTER
Reason for visit: PT follow up     Relevant information: urge incontinence     Records/imaging/labs: all records available    Pt called: no need for a call    Rooming: PVR

## 2018-10-11 DIAGNOSIS — M54.5 LOW BACK PAIN, UNSPECIFIED BACK PAIN LATERALITY, UNSPECIFIED CHRONICITY, WITH SCIATICA PRESENCE UNSPECIFIED: ICD-10-CM

## 2018-10-11 NOTE — TELEPHONE ENCOUNTER
cyclobenzaprine (FLEXERIL) 10 MG tablet      Last Written Prescription Date:  10-6-17  Last Fill Quantity: 90,   # refills: 1  Last Office Visit : 10-6-17  Future Office visit:  10-27-18    Routing refill request to RN for review/approval because:  Drug not on the [facility], P or [facility] refill protocol.

## 2018-10-12 RX ORDER — CYCLOBENZAPRINE HCL 10 MG
TABLET ORAL
Qty: 90 TABLET | Refills: 0 | Status: SHIPPED | OUTPATIENT
Start: 2018-10-12 | End: 2019-02-22

## 2018-10-13 DIAGNOSIS — M54.5 LOW BACK PAIN, UNSPECIFIED BACK PAIN LATERALITY, UNSPECIFIED CHRONICITY, WITH SCIATICA PRESENCE UNSPECIFIED: ICD-10-CM

## 2018-10-16 RX ORDER — CYCLOBENZAPRINE HCL 10 MG
TABLET ORAL
Qty: 90 TABLET | Refills: 0 | OUTPATIENT
Start: 2018-10-16

## 2018-10-18 ENCOUNTER — TELEPHONE (OUTPATIENT)
Dept: INTERNAL MEDICINE | Facility: CLINIC | Age: 63
End: 2018-10-18
Payer: COMMERCIAL

## 2018-10-18 NOTE — TELEPHONE ENCOUNTER
[facility] Call Center    Phone Message    May a detailed message be left on voicemail: yes    Reason for Call: Other: Pt requesting new neurological testing, Pt hasnt been tested since 2010 Please send referral to [place] [facility] Fax number:[phone].     Action Taken: Message routed to:  [facility] med

## 2018-10-19 NOTE — TELEPHONE ENCOUNTER
Message left with patient's home voicemail to call clinic back.  Mendoza Ochoa LPN at 1:22 PM on 10/19/2018

## 2018-10-22 NOTE — TELEPHONE ENCOUNTER
Contacted patient and left voice mail on home phone # regarding Dr. Easley's recommendation to address need for referral at her appointment time for this coming Saturday.  Mendoza Ochoa LPN at 10:56 AM on 10/22/2018

## 2018-10-22 NOTE — TELEPHONE ENCOUNTER
I would prefer to discuss the need for referral at the time of her appointment.  Please let her know.  Thank you.  SB

## 2018-10-22 NOTE — TELEPHONE ENCOUNTER
Contacted patient regarding possible referral to [place] [facility].  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  She would like the provider to be aware that she will want this referral and may be like to  call ahead of her sat visit time to make this appointment with them.  She is requesting an order, clinical notes and demographics to be sent to  this epilepsy group.  Advised her she may need to wait until appointment on Saturday to be seen however she would like to call 'group' sooner.  Mendoza Ochoa LPN at 9:07 AM on 10/22/2018

## 2018-11-09 ENCOUNTER — DOCUMENTATION ONLY (OUTPATIENT)
Dept: INTERNAL MEDICINE | Facility: CLINIC | Age: 63
End: 2018-11-09

## 2018-11-23 DIAGNOSIS — I10 ESSENTIAL HYPERTENSION, BENIGN: ICD-10-CM

## 2018-11-27 RX ORDER — DILTIAZEM HYDROCHLORIDE 240 MG/1
240 CAPSULE, COATED, EXTENDED RELEASE ORAL DAILY
Qty: 90 CAPSULE | Refills: 0 | Status: SHIPPED | OUTPATIENT
Start: 2018-11-27 | End: 2019-02-22

## 2018-11-27 NOTE — TELEPHONE ENCOUNTER
Last Clinic Visit: 10/6/2017  [facility]  Diltazem Refill  FYI to CMA: Overdue lab/appt  90 day RF sent to pharmacy  Scheduling has been notified to contact the pt for appointment.

## 2018-11-28 NOTE — TELEPHONE ENCOUNTER
Called patient, no answered. Left message to call back to schedule annual medication check and labs appointment.

## 2018-12-05 ENCOUNTER — PATIENT OUTREACH (OUTPATIENT)
Dept: CARE COORDINATION | Facility: CLINIC | Age: 63
End: 2018-12-05

## 2018-12-08 DIAGNOSIS — M54.5 LOW BACK PAIN, UNSPECIFIED BACK PAIN LATERALITY, UNSPECIFIED CHRONICITY, WITH SCIATICA PRESENCE UNSPECIFIED: ICD-10-CM

## 2018-12-11 RX ORDER — CYCLOBENZAPRINE HCL 10 MG
TABLET ORAL
Qty: 90 TABLET | Refills: 0 | OUTPATIENT
Start: 2018-12-11

## 2018-12-16 DIAGNOSIS — K21.9 GASTROESOPHAGEAL REFLUX DISEASE WITHOUT ESOPHAGITIS: ICD-10-CM

## 2018-12-17 RX ORDER — PANTOPRAZOLE SODIUM 40 MG/1
40 TABLET, DELAYED RELEASE ORAL DAILY
Qty: 30 TABLET | Refills: 0 | Status: SHIPPED | OUTPATIENT
Start: 2018-12-17 | End: 2019-02-22

## 2018-12-21 DIAGNOSIS — I10 ESSENTIAL HYPERTENSION: ICD-10-CM

## 2018-12-26 RX ORDER — IRBESARTAN 150 MG/1
150 TABLET ORAL AT BEDTIME
Qty: 90 TABLET | Refills: 0 | Status: SHIPPED | OUTPATIENT
Start: 2018-12-26 | End: 2019-02-22

## 2018-12-26 NOTE — TELEPHONE ENCOUNTER
Last Clinic Visit: 10/6/2017  [facility]  Avapro Refill: 90 day sent to pharmacy  TYESHA Law to Clinic CMA  Next Clinic Visit: 1-16-19

## 2018-12-30 DIAGNOSIS — R39.15 URINARY URGENCY: ICD-10-CM

## 2019-01-04 NOTE — TELEPHONE ENCOUNTER
solifenacin (VESICARE) 5 MG   Last Written Prescription Date:  9/24/18  Last Fill Quantity: 30,   # refills: 3  Last Office Visit : 4/26/18  Future Office visit:  1/17/19    Routing refill request to provider for review/approval because:  FAILED PROTOCOL

## 2019-01-07 RX ORDER — SOLIFENACIN SUCCINATE 5 MG/1
5 TABLET, FILM COATED ORAL DAILY
Qty: 30 TABLET | Refills: 3 | Status: SHIPPED | OUTPATIENT
Start: 2019-01-07 | End: 2019-01-17

## 2019-01-14 DIAGNOSIS — K21.9 GASTROESOPHAGEAL REFLUX DISEASE WITHOUT ESOPHAGITIS: ICD-10-CM

## 2019-01-15 RX ORDER — PANTOPRAZOLE SODIUM 40 MG/1
40 TABLET, DELAYED RELEASE ORAL DAILY
Qty: 30 TABLET | Refills: 0 | OUTPATIENT
Start: 2019-01-15

## 2019-01-15 NOTE — TELEPHONE ENCOUNTER
"Sent 12/17/2018: \"Take 1 tablet (40 mg) by mouth daily *clinic visit 1/16/19- additional refills at that time - Oral\"    "

## 2019-01-17 ENCOUNTER — OFFICE VISIT (OUTPATIENT)
Dept: UROLOGY | Facility: CLINIC | Age: 64
End: 2019-01-17
Payer: COMMERCIAL

## 2019-01-17 VITALS
DIASTOLIC BLOOD PRESSURE: 50 MMHG | HEIGHT: 64 IN | HEART RATE: 87 BPM | BODY MASS INDEX: 36.7 KG/M2 | WEIGHT: 215 LBS | SYSTOLIC BLOOD PRESSURE: 120 MMHG

## 2019-01-17 DIAGNOSIS — M62.89 PELVIC FLOOR DYSFUNCTION: ICD-10-CM

## 2019-01-17 DIAGNOSIS — N39.41 URGE INCONTINENCE: Primary | ICD-10-CM

## 2019-01-17 DIAGNOSIS — R39.15 URINARY URGENCY: ICD-10-CM

## 2019-01-17 RX ORDER — SOLIFENACIN SUCCINATE 5 MG/1
5 TABLET, FILM COATED ORAL DAILY
Qty: 90 TABLET | Refills: 3 | Status: SHIPPED | OUTPATIENT
Start: 2019-01-17 | End: 2020-04-30

## 2019-01-17 ASSESSMENT — MIFFLIN-ST. JEOR: SCORE: 1507.29

## 2019-01-17 ASSESSMENT — PAIN SCALES - GENERAL: PAINLEVEL: NO PAIN (0)

## 2019-01-17 NOTE — PROGRESS NOTES
"Urology Clinic Note      Date: 1/17/2019  Time: 4:37 PM  Patient: Dara Lamb  MRN: [id number]    HPI/Subjective:   Patient returns today for follow up. She james had one session of pelvic floor therapy. She denies any changes in her health since last visit.  She is using the vesicare and says she is completely dry with it.      Objective:  /50   Pulse 87   Ht 1.613 m (5' 3.5\")   Wt 97.5 kg (215 lb)   BMI 37.49 kg/m    Gen: In NAD, conversant.  Resp: Breathing non-labored  CV: Extremities warm, .  Abd: Soft, non-distended, non-tender.    Assessment & Plan: 63 year old F with history of microscopic hematuria s/p negative urologic evaluation, urinary urgency, urge incontinence, pelvic floor dysfunction improved with vesicare       - patient still interested in pelvic floor PT, new referral given     Theresa Mauricio MD  Urology PGY4    Addendum:    The patient was seen and evaluated with the resident.  The plan was formulated in conjunction with me and I agree with the above note with changes made as necessary.    Bladder scan for 139mL (she last voided 1.5 hr ago).  Solifenacin refilled for one year.  PFPT referral placed as patient would like to try this again    RTC one year, sooner if needed    15 minutes were spent with patient today, >50% in counseling and coordination of care    Bridget Prescott MD MPH   of Urology    CC  Patient Care Team:  Rosenda Easley MD as PCP - General (Internal Medicine)  Bridget Prescott MD as MD (Urology)  Daisy Brown RN as Registered Nurse (Urology)  SELF, REFERRED            "

## 2019-01-17 NOTE — PATIENT INSTRUCTIONS
Please continue the medication    Please see one of the dedicated pelvic floor physical therapist ([facility] [phone])    Please return to see me in 1 year, sooner if needed    It was a pleasure meeting with you today.  Thank you for allowing me and my team the privilege of caring for you today.  YOU are the reason we are here, and I truly hope we provided you with the excellent service you deserve.  Please let us know if there is anything else we can do for you so that we can be sure you are leaving completely satisfied with your care experience.

## 2019-01-17 NOTE — LETTER
"  RE: Dara Lamb  [address]  [place] [address]     Dear Colleague,    Thank you for referring your patient, Dara Lamb, to the [facility] UROLOGY AND INST FOR PROSTATE AND UROLOGIC CANCERS at [facility]. Please see a copy of my visit note below.    Urology Clinic Note  Date: 1/17/2019  Time: 4:37 PM  Patient: Dara Lamb  MRN: [id number]    HPI/Subjective:   Patient returns today for follow up. She james had one session of pelvic floor therapy. She denies any changes in her health since last visit.  She  is using the vesicare and says she is completely dry with it.      Objective:  /50   Pulse 87   Ht 1.613 m (5' 3.5\")   Wt 97.5 kg (215 lb)   BMI 37.49 kg/m     Gen: In NAD, conversant.  Resp: Breathing non-labored  CV: Extremities warm, .  Abd: Soft, non-distended, non-tender.    Assessment & Plan: 63 year old F with history of microscopic hematuria s/p negative urologic evaluation, urinary urgency, urge incontinence, pelvic floor dysfunction improved with vesicare       - patient still interested in pelvic floor PT, new referral given     Theresa Mauricio MD  Urology PGY4    Addendum:    The patient was seen and evaluated with the resident.  The plan was formulated in conjunction with me and I agree with the above note with changes made as necessary.    Bladder scan for 139mL (she last voided 1.5 hr ago).  Solifenacin refilled for one year.  PFPT referral placed as patient would like to try this again    RTC one year, sooner if needed    15 minutes were spent with patient today, >50% in counseling and coordination of care    Bridget Prescott MD MPH   of Urology    CC  Patient Care Team:  Rosenda Easley MD as PCP - General (Internal Medicine)  [place], Bridget Pastrana MD as MD (Urology)  Daisy Brown, RN as Registered Nurse (Urology)  SELF, REFERRED  "

## 2019-01-17 NOTE — NURSING NOTE
"Chief Complaint   Patient presents with     Follow Up     follow up symptom check, would like another order for PT, no symptoms currently       Blood pressure 120/50, pulse 87, height 1.613 m (5' 3.5\"), weight 97.5 kg (215 lb), not currently breastfeeding. Body mass index is 37.49 kg/m .    Patient Active Problem List   Diagnosis     Esophageal reflux     Abnormal Pap smear and cervical HPV (human papillomavirus)     Breast neoplasm     Cerebral aneurysm, nonruptured     Migraine without aura     Malignant neoplasm of connective and other soft tissue     Diverticulitis of colon     Hyperlipidemia     Essential hypertension, benign     Lumbago     Spinal stenosis, lumbar region, without neurogenic claudication     Obstructive sleep apnea     Restless legs syndrome (RLS)     Primary thyroid papillary carcinoma (H)     Bipolar affective disorder (H)     Glaucoma     Class 2 obesity due to excess calories with body mass index (BMI) of 36.0 to 36.9 in adult, unspecified whether serious comorbidity present     Gastroesophageal reflux disease without esophagitis     Essential hypertension     Low back pain, unspecified back pain laterality, unspecified chronicity, with sciatica presence unspecified     Breast cancer in female (H)     Urgency incontinence     Pelvic somatic dysfunction       Allergies   Allergen Reactions     Contrast Dye Shortness Of Breath     Atorvastatin Calcium      myalgias     Dust Mite Extract Other (See Comments)     Sneezing     Dust Mites Other (See Comments)     Sneezing around dogs and cats     Hydrochlorothiazide      Latex      Oxycodone-Acetaminophen Nausea and Vomiting     Sulfa Drugs      arthralgias     Vicodin [Hydrocodone-Acetaminophen]      Nausea, vomiting       Current Outpatient Medications   Medication Sig Dispense Refill     atomoxetine (STRATTERA) 60 MG capsule Take 60 mg by mouth 2 times daily       calcium carbonate (OS-RUBEN 500 MG [place]. [place]) 500 MG tablet Take 2 tablets by mouth " daily. 60 tablet 0     cyclobenzaprine (FLEXERIL) 10 MG tablet TAKE 1 TABLET (10 MG) BY MOUTH AT BEDTIME 90 tablet 0     diltiazem 240 MG 24 hr capsule Take 1 capsule (240 mg) by mouth daily Please call clinic to schedule follow up appointment, [phone] 90 capsule 0     estradiol (ESTRACE) 0.1 MG/GM vaginal cream Place 2 g vaginally twice a week       fish oil-omega-3 fatty acids 1000 MG capsule Take 1 capsule by mouth       gabapentin (NEURONTIN) 600 MG tablet Take 600 mg by mouth At Bedtime       irbesartan (AVAPRO) 150 MG tablet Take 1 tablet (150 mg) by mouth At Bedtime Please keep 1-16-19 clinic appt for further refills. 90 tablet 0     latanoprost (XALATAN) 0.005 % ophthalmic solution Place 1 drop into both eyes At Bedtime       levothyroxine (SYNTHROID) 137 MCG tablet Take 137 mcg by mouth daily       lurasidone (LATUDA) 60 MG TABS tablet Take by mouth At Bedtime       multivitamin, therapeutic with minerals (THERA-VIT-M) TABS Take 1 tablet by mouth daily 90 each 3     pantoprazole (PROTONIX) 40 MG EC tablet Take 1 tablet (40 mg) by mouth daily *clinic visit 1/16/19- additional refills at that time 30 tablet 0     polyethylene glycol (MIRALAX) powder Take 17 g by mouth daily 119 g 2     QUEtiapine (SEROQUEL XR) 50 MG TB24 Take 75 mg by mouth At Bedtime. Take 1.5 tablets daily.       rosuvastatin (CRESTOR) 40 MG tablet TAKE 1 TABLET (40 MG) BY MOUTH DAILY 90 tablet 3     solifenacin (VESICARE) 5 MG tablet Take 1 tablet (5 mg) by mouth daily 30 tablet 3     vitamin  B complex with vitamin C (STRESS TAB) TABS Take 1 tablet by mouth daily 90 tablet 1     vortioxetine (TRINTELLIX) 20 MG tablet 20 mg       desmopressin (DDAVP) 0.2 MG tablet Take 0.2 mg by mouth At Bedtime       traMADol-acetaminophen (ULTRACET) 37.5-325 MG per tablet TAKE 1 TO 2 TABLETS BY MOUTH EVERY 6 HOURS AS NEEDED FOR STRONG BACK PAIN. MAX IS 30 TABS IN 30 DAYS.  See doctor for additional refills. (Patient not taking: Reported on  1/17/2019) 30 tablet 0       Social History     Tobacco Use     Smoking status: Never Smoker     Smokeless tobacco: Never Used   Substance Use Topics     Alcohol use: No     Drug use: No       Jyotsna Albarran LPN  1/17/2019  4:34 PM

## 2019-01-18 PROBLEM — R39.15 URINARY URGENCY: Status: ACTIVE | Noted: 2019-01-18

## 2019-01-18 PROBLEM — N39.41 URGE INCONTINENCE: Status: ACTIVE | Noted: 2018-06-01

## 2019-01-18 PROBLEM — M62.89 PELVIC FLOOR DYSFUNCTION: Status: ACTIVE | Noted: 2019-01-18

## 2019-02-05 ENCOUNTER — TRANSFERRED RECORDS (OUTPATIENT)
Dept: HEALTH INFORMATION MANAGEMENT | Facility: CLINIC | Age: 64
End: 2019-02-05

## 2019-02-21 NOTE — PROGRESS NOTES
CC:  Here for routine physical    HPI:  Overall doing well, reviewed HCM as noted below.  Life is stable at this time, no active mental health symptoms, son and daugther at home, son autistic, daughter graduating from [employer] this year.  Carissa has a boyfriend, Emil.  They are travelling to [facility] this year.    Last OV with me was 10/6/17 for a routine physical    Obesity: Discussed weight management  Wt Readings from Last 5 Encounters:   01/17/19 97.5 kg (215 lb)   04/26/18 95.6 kg (210 lb 12.8 oz)   01/25/18 94.5 kg (208 lb 4.8 oz)   10/06/17 96 kg (211 lb 9.6 oz)   09/13/16 93.3 kg (205 lb 11.2 oz)     Chronic LBP (spinal stenosis L4-5), stable, uses cyclobenzaprine prn, last rx #90 on 10/12/18    HTN  BP Readings from Last 6 Encounters:   01/17/19 120/50   04/26/18 124/79   01/25/18 123/86   10/06/17 115/79   09/13/16 110/77   04/01/16 105/73     Hyperlipidemia  Component      Latest Ref Rng & Units 6/21/2016   Cholesterol      <200 mg/dL 171   Triglycerides      <150 mg/dL 40   HDL Cholesterol      >49 mg/dL 95   LDL Cholesterol Calculated      <100 mg/dL 68   Non HDL Cholesterol      <130 mg/dL 76     Component      Latest Ref Rng & Units 10/6/2017   Hemoglobin A1C      4.3 - 6.0 % 6.0     Hx thyroid and breast cancer, followed by outside providers.  Will no longer be seeing Dr. Edgar Moore/oncology for breast cancer.    TFT's 1/9/19, TSH 0.11 (on supressent levothyroxine)    Other HCM:  Mammogram:  Post treatment changes within right breast (hx breast cancer), otherwise negative (followed by outside heme/onc) -[facility] for imaging  Colonoscopy 2/4/14 neg, but has hx of polyp x 2 in 2012  Has not had Shingrix, otherwise vaccines up to date  Last pap--followed by outside provider    ROS:  Last saw Urology 1/17/19 for urge incontinence, pelvic floor dysfunction, hx microscopic hematuria, neg urologic eval, another referral given to pelvic floor PT  First consulted with Urology on  4/26/18    Ophthalmology 10/17/18, hx 2016 ERM/lamellar hole and VMT surgery left mild lamellar hole and ERM right w/o need for surgery.  F/U January 2019, add second med if IOP above 16    Answers for HPI/ROS submitted by the patient on 2/22/2019   General Symptoms: Yes  Skin Symptoms: No  HENT Symptoms: No  EYE SYMPTOMS: No  HEART SYMPTOMS: No  LUNG SYMPTOMS: No  INTESTINAL SYMPTOMS: No  URINARY SYMPTOMS: No  GYNECOLOGIC SYMPTOMS: Yes  BREAST SYMPTOMS: No  SKELETAL SYMPTOMS: Yes  BLOOD SYMPTOMS: No  NERVOUS SYSTEM SYMPTOMS: No  MENTAL HEALTH SYMPTOMS: No  Fever: No  Loss of appetite: No  Weight loss: No  Weight gain: No  Fatigue: Yes  Night sweats: No  Chills: No  Increased stress: No  Excessive hunger: No  Excessive thirst: No  Feeling hot or cold when others believe the temperature is normal: No  Loss of height: No  Post-operative complications: No  Surgical site pain: No  Hallucinations: No  Change in or Loss of Energy: No  Hyperactivity: No  Confusion: No  Back pain: Yes  Muscle aches: No  Neck pain: No  Swollen joints: No  Joint pain: No  Bone pain: No  Muscle cramps: No  Muscle weakness: No  Joint stiffness: No  Bone fracture: No  Bleeding or spotting between periods: No  Heavy or painful periods: No  Irregular periods: No  Vaginal discharge: No  Hot flashes: No  Vaginal dryness: Yes  Genital ulcers: No  Reduced libido: Yes  Painful intercourse: No  Difficulty with sexual arousal: No  Post-menopausal bleeding: No      Patient Active Problem List   Diagnosis     Esophageal reflux     Abnormal Pap smear and cervical HPV (human papillomavirus)     Breast neoplasm     Cerebral aneurysm, nonruptured     Migraine without aura     Malignant neoplasm of connective and other soft tissue     Diverticulitis of colon     Hyperlipidemia     Essential hypertension, benign     Lumbago     Spinal stenosis, lumbar region, without neurogenic claudication     Obstructive sleep apnea     Restless legs syndrome (RLS)     Primary  thyroid papillary carcinoma (H)     Bipolar affective disorder (H)     Glaucoma     Class 2 obesity due to excess calories with body mass index (BMI) of 36.0 to 36.9 in adult, unspecified whether serious comorbidity present     Gastroesophageal reflux disease without esophagitis     Essential hypertension     Low back pain, unspecified back pain laterality, unspecified chronicity, with sciatica presence unspecified     Breast cancer in female (H)     Urge incontinence     Pelvic somatic dysfunction     Pelvic floor dysfunction     Urinary urgency     Past Surgical History:   Procedure Laterality Date      SECTION      , 87, 97     COLECTOMY LEFT      sigmoid colon , diverticulitis, colostomy  reanastomosis     COLONOSCOPY      2009     LAMINECTOMY LUMBAR ONE LEVEL      for cauda equina syndrome, 2006     LAPAROTOMY EXPLORATORY      , for ?bowel obstx r/o pelvic infection     LUMPECTOMY BREAST      right breast 5/3/11     MAMMOPLASTY REDUCTION BILATERAL      11     THYROIDECTOMY       TONSILLECTOMY      10/26/03     WRIST SURGERY       Family History   Problem Relation Age of Onset     Prostate Cancer Father      Alcohol/Drug Father      Alcohol/Drug Brother      Psychotic Disorder Son         autism     Psychotic Disorder Unknown         ADD (3 children)     C.A.D. Brother         2 MI's with stents     Psychotic Disorder Sister         depression     Psychotic Disorder Brother         depression     Lipids Father      Hypertension Mother      Blood Disease Maternal Grandmother         DVT, PE     Cancer Maternal Grandmother      Heart Disease Brother         Aortic and mitral valve replacement     Asthma Sister      Cerebrovascular Disease Father         incidental finding on MRI     Social History     Socioeconomic History     Marital status:      Spouse name: Not on file     Number of children: Not on file     Years of education: Not on file     Highest education level:  Not on file   Occupational History     Not on file   Social Needs     Financial resource strain: Not on file     Food insecurity:     Worry: Not on file     Inability: Not on file     Transportation needs:     Medical: Not on file     Non-medical: Not on file   Tobacco Use     Smoking status: Never Smoker     Smokeless tobacco: Never Used   Substance and Sexual Activity     Alcohol use: No     Drug use: No     Sexual activity: Not on file   Lifestyle     Physical activity:     Days per week: Not on file     Minutes per session: Not on file     Stress: Not on file   Relationships     Social connections:     Talks on phone: Not on file     Gets together: Not on file     Attends Christianity service: Not on file     Active member of club or organization: Not on file     Attends meetings of clubs or organizations: Not on file     Relationship status: Not on file     Intimate partner violence:     Fear of current or ex partner: Not on file     Emotionally abused: Not on file     Physically abused: Not on file     Forced sexual activity: Not on file   Other Topics Concern     Not on file   Social History Narrative    .  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  Has significant (Bari). Three children all with ADD.  Son with autism (lives with her).  Daughter, age 21, graduating from [employer] this year. No tobacco use.  Previous excessive alcohol use, now in remission.    19     Current Outpatient Medications   Medication Sig Dispense Refill     atomoxetine (STRATTERA) 60 MG capsule Take 60 mg by mouth 2 times daily       calcium carbonate (OS-RUBEN 500 MG [place]) 500 MG tablet Take 2 tablets by mouth daily. 60 tablet 0     cyclobenzaprine (FLEXERIL) 10 MG tablet Take 1 tablet (10 mg) by mouth daily as needed for muscle spasms 90 tablet 3     diltiazem ER COATED BEADS (CARDIZEM CD/CARTIA XT) 240 MG 24 hr capsule Take 1  capsule (240 mg) by mouth daily 90 capsule 3     [START ON 2/25/2019] estradiol (ESTRACE) 0.1 MG/GM vaginal cream Place 2 g vaginally twice a week 42.5 g 3     eszopiclone (LUNESTA) 3 MG tablet TAKE 1 TABLET BY MOUTH AT BEDTIME AS NEEDED FOR SLEEP  2     fish oil-omega-3 fatty acids 1000 MG capsule Take 1 capsule by mouth       gabapentin (NEURONTIN) 600 MG tablet Take 600 mg by mouth At Bedtime       ibuprofen (ADVIL/MOTRIN) 200 MG tablet Take 4 tablets (800 mg) by mouth daily as needed for moderate pain Do not take if you have stomach upset or gastrointestinal bleeding. 180 tablet 3     irbesartan (AVAPRO) 150 MG tablet Take 1 tablet (150 mg) by mouth At Bedtime 90 tablet 3     latanoprost (XALATAN) 0.005 % ophthalmic solution Place 1 drop into both eyes At Bedtime       levothyroxine (SYNTHROID) 137 MCG tablet Take 1 tablet (137 mcg) by mouth daily 90 tablet 3     lurasidone (LATUDA) 60 MG TABS tablet Take by mouth At Bedtime       multivitamin, therapeutic with minerals (THERA-VIT-M) TABS Take 1 tablet by mouth daily 90 each 3     pantoprazole (PROTONIX) 40 MG EC tablet Take 1 tablet (40 mg) by mouth daily 90 tablet 3     polyethylene glycol (MIRALAX) powder Take 17 g by mouth daily 119 g 2     QUEtiapine (SEROQUEL XR) 50 MG TB24 Take 75 mg by mouth At Bedtime. Take 1.5 tablets daily.       rosuvastatin (CRESTOR) 40 MG tablet Take 1 tablet (40 mg) by mouth daily 90 tablet 3     solifenacin (VESICARE) 5 MG tablet Take 1 tablet (5 mg) by mouth daily 90 tablet 3     vitamin  B complex with vitamin C (STRESS TAB) TABS Take 1 tablet by mouth daily 90 tablet 1     vortioxetine (TRINTELLIX) 20 MG tablet 20 mg       Allergies   Allergen Reactions     Contrast Dye Shortness Of Breath     Atorvastatin Calcium      myalgias     Dust Mite Extract Other (See Comments)     Sneezing     Dust Mites Other (See Comments)     Sneezing around dogs and cats     Hydrochlorothiazide      Latex      Oxycodone-Acetaminophen Nausea and  "Vomiting     Sulfa Drugs      arthralgias     Vicodin [Hydrocodone-Acetaminophen]      Nausea, vomiting     /78   Pulse 114   Ht 1.598 m (5' 2.91\")   Wt 99.1 kg (218 lb 8 oz)   BMI 38.81 kg/m    Physical Examination:    General:  Conversant, generally healthy appearing, no acute distress  Head: atraumatic  Eyes:  Pupils 2-3 mm, sclera white, EOM's full, conjunctiva moist, no lid lag    Ears:  TM on left with cerumen impaction, right ear EAC patent, no cerumen cerumen  Nose:  Nasal passages clear, turbinates not swollen  Throat/Mouth:  No pharyngeal erythema, exudate, ulcers, oral mucosa and tongue moist, normal hard and soft palate  Neck:  Trachea midline, Full AROM, supple, no masses in thyroid area (s/p thyroidectomy), no neck lymphadenopathy  Lungs:  Clear to auscultation throughout, no wheezes, rhonchi or rales. Normal respiratory effort and no intercostal retractions.  C/V:  Regular rate and rhythm, no murmurs, rubs or gallops.  No JVD, normal carotid upstroke and amplitude, no carotid bruits.  Abdomen:  Not distended.  Bowel sounds active.  No tenderness, no hepatosplenomegaly or masses.  No CVA tenderness or masses.  Lymph:  No cervical lymph nodes.  Neuro: Alert and oriented, face symmetric. No tremor. Gets on/off exam table with ease.   M/S:   No joint deformities noted.  No joint swelling.  Skin:   Normal temperature., turgor and texture. No rashes or  suspicious lesions on face/trunk/distal UE's,  No jaundice or ulcers    Extremities:  No peripheral edema, no digital cyanosis  Psych:  Alert and oriented to person, place and time. Appropriate affect.  Not psychomotor slowed.  No signs of anxiety or agitation.    Dara was seen today for physical.    Diagnoses and all orders for this visit:    Routine history and physical examination of adult    Low back pain, unspecified back pain laterality, unspecified chronicity, with sciatica presence unspecified  -     Refill cyclobenzaprine (FLEXERIL) 10 " MG tablet; Take 1 tablet (10 mg) by mouth daily as needed for muscle spasms    Essential hypertension, benign  -     Refill diltiazem ER COATED BEADS (CARDIZEM CD/CARTIA XT) 240 MG 24 hr capsule; Take 1 capsule (240 mg) by mouth daily  -     Comprehensive metabolic panel; Future  -     CBC with platelets; Future  -     Refill irbesartan (AVAPRO) 150 MG tablet; Take 1 tablet (150 mg) by mouth At Bedtime    Gastroesophageal reflux disease without esophagitis  -     Refill pantoprazole (PROTONIX) 40 MG EC tablet; Take 1 tablet (40 mg) by mouth daily    Morbid obesity (H)  -     Hemoglobin A1c; Future  -     Medications contributing to weight gain, aware of lifestyle changes needed    Atrophic vaginitis  -     Refill estradiol (ESTRACE) 0.1 MG/GM vaginal cream; Place 2 g vaginally twice a week    Post-surgical hypothyroidism  -     Refill levothyroxine (SYNTHROID) 137 MCG tablet; Take 1 tablet (137 mcg) by mouth daily  -     TSH.  Follows with outside provider for hx thyroid cancer, gets surveillance US's there    Hyperlipidemia LDL goal <130  -     Lipid Profile FASTING; Future  -     Refill rosuvastatin (CRESTOR) 40 MG tablet; Take 1 tablet (40 mg) by mouth daily    Colon cancer screening, history of colon polyps  -     GASTROENTEROLOGY ADULT REFERRAL; Future    Need for vaccination  -     ZOSTER VACCINE RECOMBINANT ADJUVANTED IM NJX    Cerumen impaction, left, cleared by LPN.    F/U one year.    Rosenda Easley M.D.  [facility] Medicine  [facility]   pager [phone]

## 2019-02-22 ENCOUNTER — HOSPITAL ENCOUNTER (OUTPATIENT)
Dept: PHYSICAL MEDICINE AND REHAB | Facility: CLINIC | Age: 64
Discharge: HOME | End: 2019-02-22
Attending: PHYSICAL MEDICINE & REHABILITATION

## 2019-02-22 ENCOUNTER — OFFICE VISIT (OUTPATIENT)
Dept: INTERNAL MEDICINE | Facility: CLINIC | Age: 64
End: 2019-02-22
Payer: COMMERCIAL

## 2019-02-22 VITALS
WEIGHT: 218.5 LBS | BODY MASS INDEX: 38.71 KG/M2 | SYSTOLIC BLOOD PRESSURE: 113 MMHG | HEART RATE: 114 BPM | HEIGHT: 63 IN | DIASTOLIC BLOOD PRESSURE: 78 MMHG

## 2019-02-22 DIAGNOSIS — M54.50 LUMBAR SPINE PAIN: ICD-10-CM

## 2019-02-22 DIAGNOSIS — E89.0 POST-SURGICAL HYPOTHYROIDISM: ICD-10-CM

## 2019-02-22 DIAGNOSIS — H61.22 IMPACTED CERUMEN OF LEFT EAR: Primary | ICD-10-CM

## 2019-02-22 DIAGNOSIS — Z12.11 COLON CANCER SCREENING: ICD-10-CM

## 2019-02-22 DIAGNOSIS — M47.816 LUMBAR SPONDYLOSIS: ICD-10-CM

## 2019-02-22 DIAGNOSIS — M79.18 MYOFASCIAL PAIN: ICD-10-CM

## 2019-02-22 DIAGNOSIS — Z86.0100 HISTORY OF COLONIC POLYPS: ICD-10-CM

## 2019-02-22 DIAGNOSIS — E66.01 MORBID OBESITY (H): ICD-10-CM

## 2019-02-22 DIAGNOSIS — E78.5 HYPERLIPIDEMIA LDL GOAL <130: ICD-10-CM

## 2019-02-22 DIAGNOSIS — Z23 NEED FOR VACCINATION: ICD-10-CM

## 2019-02-22 DIAGNOSIS — M48.061 SPINAL STENOSIS OF LUMBAR REGION, UNSPECIFIED WHETHER NEUROGENIC CLAUDICATION PRESENT: ICD-10-CM

## 2019-02-22 DIAGNOSIS — M54.5 LOW BACK PAIN, UNSPECIFIED BACK PAIN LATERALITY, UNSPECIFIED CHRONICITY, WITH SCIATICA PRESENCE UNSPECIFIED: ICD-10-CM

## 2019-02-22 DIAGNOSIS — Z00.00 ROUTINE HISTORY AND PHYSICAL EXAMINATION OF ADULT: ICD-10-CM

## 2019-02-22 DIAGNOSIS — N95.2 ATROPHIC VAGINITIS: ICD-10-CM

## 2019-02-22 DIAGNOSIS — K21.9 GASTROESOPHAGEAL REFLUX DISEASE WITHOUT ESOPHAGITIS: ICD-10-CM

## 2019-02-22 DIAGNOSIS — M53.3 SACROILIAC JOINT PAIN: ICD-10-CM

## 2019-02-22 DIAGNOSIS — I10 ESSENTIAL HYPERTENSION, BENIGN: ICD-10-CM

## 2019-02-22 RX ORDER — DILTIAZEM HYDROCHLORIDE 240 MG/1
240 CAPSULE, COATED, EXTENDED RELEASE ORAL DAILY
Qty: 90 CAPSULE | Refills: 3 | Status: SHIPPED | OUTPATIENT
Start: 2019-02-22 | End: 2020-02-20

## 2019-02-22 RX ORDER — ROSUVASTATIN CALCIUM 40 MG/1
40 TABLET, COATED ORAL DAILY
Qty: 90 TABLET | Refills: 3 | Status: SHIPPED | OUTPATIENT
Start: 2019-02-22 | End: 2020-03-19

## 2019-02-22 RX ORDER — ESTRADIOL 0.1 MG/G
2 CREAM VAGINAL
Qty: 42.5 G | Refills: 3 | Status: SHIPPED | OUTPATIENT
Start: 2019-02-25 | End: 2021-07-15

## 2019-02-22 RX ORDER — LEVOTHYROXINE SODIUM 137 UG/1
137 TABLET ORAL DAILY
Qty: 90 TABLET | Refills: 3 | Status: SHIPPED | OUTPATIENT
Start: 2019-02-22 | End: 2021-07-15

## 2019-02-22 RX ORDER — PANTOPRAZOLE SODIUM 40 MG/1
40 TABLET, DELAYED RELEASE ORAL DAILY
Qty: 90 TABLET | Refills: 3 | Status: SHIPPED | OUTPATIENT
Start: 2019-02-22 | End: 2020-02-20

## 2019-02-22 RX ORDER — ESZOPICLONE 3 MG/1
TABLET, FILM COATED ORAL
Refills: 2 | COMMUNITY
Start: 2019-01-18

## 2019-02-22 RX ORDER — IRBESARTAN 150 MG/1
150 TABLET ORAL AT BEDTIME
Qty: 90 TABLET | Refills: 3 | Status: SHIPPED | OUTPATIENT
Start: 2019-02-22 | End: 2020-03-22

## 2019-02-22 RX ORDER — IBUPROFEN 200 MG
800 TABLET ORAL DAILY PRN
Qty: 180 TABLET | Refills: 3 | Status: ON HOLD | COMMUNITY
Start: 2019-02-22 | End: 2024-01-03

## 2019-02-22 RX ORDER — CYCLOBENZAPRINE HCL 10 MG
10 TABLET ORAL DAILY PRN
Qty: 90 TABLET | Refills: 3 | Status: SHIPPED | OUTPATIENT
Start: 2019-02-22 | End: 2020-04-09

## 2019-02-22 ASSESSMENT — ENCOUNTER SYMPTOMS
FEVER: 0
BACK PAIN: 1
DECREASED APPETITE: 0
NECK PAIN: 0
ALTERED TEMPERATURE REGULATION: 0
POLYDIPSIA: 0
FATIGUE: 1
JOINT SWELLING: 0
ARTHRALGIAS: 0
NIGHT SWEATS: 0
POLYPHAGIA: 0
CHILLS: 0
WEIGHT LOSS: 0
INCREASED ENERGY: 0
DECREASED LIBIDO: 1
MUSCLE CRAMPS: 0
STIFFNESS: 0
MYALGIAS: 0
MUSCLE WEAKNESS: 0
WEIGHT GAIN: 0
HALLUCINATIONS: 0
HOT FLASHES: 0

## 2019-02-22 ASSESSMENT — ANXIETY QUESTIONNAIRES
GAD7 TOTAL SCORE: 0
5. BEING SO RESTLESS THAT IT IS HARD TO SIT STILL: NOT AT ALL
6. BECOMING EASILY ANNOYED OR IRRITABLE: NOT AT ALL
1. FEELING NERVOUS, ANXIOUS, OR ON EDGE: NOT AT ALL
3. WORRYING TOO MUCH ABOUT DIFFERENT THINGS: NOT AT ALL
7. FEELING AFRAID AS IF SOMETHING AWFUL MIGHT HAPPEN: NOT AT ALL
2. NOT BEING ABLE TO STOP OR CONTROL WORRYING: NOT AT ALL

## 2019-02-22 ASSESSMENT — PAIN SCALES - GENERAL: PAINLEVEL: NO PAIN (0)

## 2019-02-22 ASSESSMENT — PATIENT HEALTH QUESTIONNAIRE - PHQ9
SUM OF ALL RESPONSES TO PHQ QUESTIONS 1-9: 2
5. POOR APPETITE OR OVEREATING: NOT AT ALL

## 2019-02-22 ASSESSMENT — MIFFLIN-ST. JEOR
SCORE: 1434.47
SCORE: 1513.85

## 2019-02-22 NOTE — NURSING NOTE
Chief Complaint   Patient presents with     Physical     pt here for physical       Narda Prince CMA at 7:51 AM on 2/22/2019.

## 2019-02-22 NOTE — PATIENT INSTRUCTIONS
[facility] Medication Refill Request Information:  * Please contact your pharmacy regarding ANY request for medication refills.  ** [facility] Prescription Fax = [phone]  * Please allow 3 business days for routine medication refills.  * Please allow 5 business days for controlled substance medication refills.     [facility] Test Result notification information:  *You will be notified with in 7-10 days of your appointment day regarding the results of your test.  If you are on MyChart you will be notified as soon as the provider has reviewed the results and signed off on them.    [facility]: [phone]

## 2019-02-23 ASSESSMENT — ANXIETY QUESTIONNAIRES: GAD7 TOTAL SCORE: 0

## 2019-03-07 ENCOUNTER — HOSPITAL ENCOUNTER (OUTPATIENT)
Dept: PHYSICAL MEDICINE AND REHAB | Facility: CLINIC | Age: 64
Discharge: HOME | End: 2019-03-07
Attending: PHYSICAL MEDICINE & REHABILITATION

## 2019-03-07 DIAGNOSIS — M53.3 SACROILIAC JOINT PAIN: ICD-10-CM

## 2019-10-01 PROBLEM — M54.50 LOW BACK PAIN: Status: ACTIVE | Noted: 2017-10-06

## 2019-12-17 DIAGNOSIS — M54.50 LOW BACK PAIN: ICD-10-CM

## 2019-12-18 RX ORDER — CYCLOBENZAPRINE HCL 10 MG
10 TABLET ORAL DAILY PRN
Qty: 90 TABLET | Refills: 3 | OUTPATIENT
Start: 2019-12-18

## 2019-12-19 ENCOUNTER — TELEPHONE (OUTPATIENT)
Dept: INTERNAL MEDICINE | Facility: CLINIC | Age: 64
End: 2019-12-19

## 2019-12-19 NOTE — TELEPHONE ENCOUNTER
----- Message from Maria D Yang RN sent at 12/18/2019  4:56 PM CST -----  Please call to schedule.    Patient is due for annual clinic visit 2/2020- unless otherwise indicated patient needs to be scheduled with 1st available.    Patient may need labs and or imaging - please check with RN care coordinator.    Thanks    I do not need any follow up on the scheduling of this appointment unless the patient will no longer be receiving care in our clinic.

## 2020-01-17 ENCOUNTER — TRANSFERRED RECORDS (OUTPATIENT)
Dept: HEALTH INFORMATION MANAGEMENT | Facility: CLINIC | Age: 65
End: 2020-01-17

## 2020-01-28 ENCOUNTER — MEDICAL CORRESPONDENCE (OUTPATIENT)
Dept: HEALTH INFORMATION MANAGEMENT | Facility: CLINIC | Age: 65
End: 2020-01-28

## 2020-02-19 DIAGNOSIS — I10 ESSENTIAL HYPERTENSION, BENIGN: ICD-10-CM

## 2020-02-20 ENCOUNTER — OFFICE VISIT (OUTPATIENT)
Dept: INTERNAL MEDICINE | Facility: CLINIC | Age: 65
End: 2020-02-20
Payer: COMMERCIAL

## 2020-02-20 VITALS
HEART RATE: 96 BPM | DIASTOLIC BLOOD PRESSURE: 78 MMHG | WEIGHT: 216 LBS | SYSTOLIC BLOOD PRESSURE: 116 MMHG | BODY MASS INDEX: 38.37 KG/M2 | OXYGEN SATURATION: 100 %

## 2020-02-20 DIAGNOSIS — Z01.818 PREOP GENERAL PHYSICAL EXAM: Primary | ICD-10-CM

## 2020-02-20 DIAGNOSIS — K21.9 GASTROESOPHAGEAL REFLUX DISEASE WITHOUT ESOPHAGITIS: ICD-10-CM

## 2020-02-20 RX ORDER — DILTIAZEM HYDROCHLORIDE 240 MG/1
240 CAPSULE, COATED, EXTENDED RELEASE ORAL DAILY
Qty: 90 CAPSULE | Refills: 0 | Status: SHIPPED | OUTPATIENT
Start: 2020-02-20 | End: 2020-05-19

## 2020-02-20 RX ORDER — PANTOPRAZOLE SODIUM 40 MG/1
40 TABLET, DELAYED RELEASE ORAL DAILY
Qty: 90 TABLET | Refills: 3 | Status: SHIPPED | OUTPATIENT
Start: 2020-02-20 | End: 2021-07-15

## 2020-02-20 ASSESSMENT — PAIN SCALES - GENERAL: PAINLEVEL: NO PAIN (0)

## 2020-02-20 NOTE — LETTER
2020      RE: Dara Lamb  [address]  [place] [address]         [facility]  [address]  [address]TH FLO[address]  [facility] [address] [phone]   Dept: [phone]    PRE-OP EVALUATION:  Today's date: 2020    Dara Lamb (: 1955) presents for pre-operative evaluation assessment as requested by [facility].  She requires evaluation and anesthesia risk assessment prior to undergoing surgery/procedure for colonoscopy.    Proposed Surgery/ Procedure: colonoscopy  Date of Surgery/ Procedure: 3/3/2020  Hospital/Surgical Facility: [facility]  Primary Physician: Rosenda Easley  Type of Anesthesia Anticipated: to be determined    Patient has a Health Care Directive or Living Will:  NO    1. NO - Do you have a history of heart attack, stroke, stent, bypass or surgery on an artery in the head, neck, heart or legs?  2. NO - Do you ever have any pain or discomfort in your chest?  3. NO - Do you have a history of  Heart Failure?  4. NO - Are you troubled by shortness of breath when: walking on the level, up a slight hill or at night?  5. NO - Do you currently have a cold, bronchitis or other respiratory infection?  6. NO - Do you have a cough, shortness of breath or wheezing?  7. NO - Do you sometimes get pains in the calves of your legs when you walk?  8. NO - Do you or anyone in your family have previous history of blood clots?  9. NO - Do you or does anyone in your family have a serious bleeding problem such as prolonged bleeding following surgeries or cuts?  10. NO - Have you ever had problems with anemia or been told to take iron pills?  11. NO - Have you had any abnormal blood loss such as black, tarry or bloody stools, or abnormal vaginal bleeding?  12. NO - Have you ever had a blood transfusion?  13. NO - Have you or any of your relatives ever had problems with anesthesia?  14. NO - Do you have sleep apnea, excessive snoring or  daytime drowsiness?  15. NO - Do you have any prosthetic heart valves?  16. NO - Do you have prosthetic joints?  17. NO - Is there any chance that you may be pregnant?      HPI:     HPI related to upcoming procedure: scheduled colonoscopy.  This is for routine screening.  Had prior examination that went well, tolerated prep.  Denies abdominal pain, blood in stool, or weight loss.       MEDICAL HISTORY:     Patient Active Problem List    Diagnosis Date Noted     Urinary urgency 01/18/2019     Priority: Medium     Urge incontinence 06/01/2018     Priority: Medium     Pelvic somatic dysfunction 06/01/2018     Priority: Medium     Class 2 obesity due to excess calories with body mass index (BMI) of 36.0 to 36.9 in adult, unspecified whether serious comorbidity present 10/06/2017     Priority: Medium     Gastroesophageal reflux disease without esophagitis 10/06/2017     Priority: Medium     Low back pain, unspecified back pain laterality, unspecified chronicity, with sciatica presence unspecified 10/06/2017     Priority: Medium     Glaucoma      Priority: Medium     Bipolar affective disorder (H)      Priority: Medium     Primary thyroid papillary carcinoma (H)      Priority: Medium     Abnormal Pap smear and cervical HPV (human papillomavirus)      Priority: Medium     DARIO, conization 9/1990  IMO update changed this record. Please review for accuracy       Breast neoplasm      Priority: Medium     3.9 cm Stage 2B IDCa ER/CO +, HER2- 5/3/11right breast  Problem list name updated by automated process. Provider to review       Cerebral aneurysm, nonruptured      Priority: Medium     ophthalmic branch of left ICA. 2008.  Presented with syncope       Migraine without aura      Priority: Medium     Problem list name updated by automated process. Provider to review       Malignant neoplasm of connective and other soft tissue      Priority: Medium     9/1998  Problem list name updated by automated process. Provider to review        Diverticulitis of colon      Priority: Medium     5/2007  Problem list name updated by automated process. Provider to review       Essential hypertension, benign      Priority: Medium     Lumbago      Priority: Medium     right spinal canal cyst through L1-2 neuroforamin with large benign root sleeve cyst, [facility]       Spinal stenosis, lumbar region, without neurogenic claudication      Priority: Medium     2003 L4-5       Obstructive sleep apnea      Priority: Medium     Problem list name updated by automated process. Provider to review       Restless legs syndrome (RLS)      Priority: Medium     Breast cancer in female (H) 05/03/2011     Priority: Medium     invasive ductal ca, stage IIB, poorly differentiated,  ER and RI positive, lumpectomy,         Past Medical History:   Diagnosis Date     Abnormal Pap smear and cervical HPV (human papillomavirus)     DARIO, conization 9/1990     Acne      Alcohol abuse      Bipolar affective disorder (H)      Breast cancer in female (H) 05/03/2011    invasive ductal ca, stage IIB, poorly differentiated,  ER and RI positive, lumpectomy,      Cerebral aneurysm, nonruptured     ophthalmic branch of left ICA. 2008.  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     Major depressive disorder      Major depressive disorder, recurrent episode, unspecified     Suicide attempt 10/2000     Menopause     2004     Migraine without aura, without mention of intractable migraine without mention of status migrainosus      Neoplasm of unspecified nature of breast     3.9 cm Stage 2B IDCa ER/RI +, HER2- 5/3/11right breast     Obstructive sleep apnea  (adult) (pediatric)      Other and unspecified hyperlipidemia      Postmenopausal bleeding      biopsy negative     Primary thyroid papillary carcinoma (H)      Rectal bleeding     diverticular [facility] , [facility]     Restless legs syndrome (RLS)      Spinal stenosis, lumbar region, without neurogenic claudication      L4-5     Thyroid cancer (H) 2012    Stage OMAR, papillary ca, s/p thyroidectomy and mod left neck dissection     Past Surgical History:   Procedure Laterality Date      SECTION      , 87, 97     COLECTOMY LEFT      sigmoid colon , diverticulitis, colostomy  reanastomosis     COLONOSCOPY      2009     LAMINECTOMY LUMBAR ONE LEVEL      for cauda equina syndrome,      LAPAROTOMY EXPLORATORY      , for ?bowel obstx r/o pelvic infection     LUMPECTOMY BREAST      right breast 5/3/11     MAMMOPLASTY REDUCTION BILATERAL      11     THYROIDECTOMY       TONSILLECTOMY      10/26/03     WRIST SURGERY       Current Outpatient Medications   Medication Sig Dispense Refill     atomoxetine (STRATTERA) 60 MG capsule Take 100 mg by mouth 2 times daily        calcium carbonate (OS-RUBEN 500 MG [place]. [place]) 500 MG tablet Take 2 tablets by mouth daily. (Patient taking differently: Take 1,000 mg by mouth 2 times daily ) 60 tablet 0     cyclobenzaprine (FLEXERIL) 10 MG tablet Take 1 tablet (10 mg) by mouth daily as needed for muscle spasms 90 tablet 3     diltiazem ER COATED BEADS (CARDIZEM CD/CARTIA XT) 240 MG 24 hr capsule Take 1 capsule (240 mg) by mouth daily 90 capsule 3     estradiol (ESTRACE) 0.1 MG/GM vaginal cream Place 2 g vaginally twice a week 42.5 g 3     eszopiclone (LUNESTA) 3 MG tablet TAKE 1 TABLET BY MOUTH AT BEDTIME AS NEEDED FOR SLEEP  2     fish oil-omega-3 fatty acids 1000 MG capsule Take 1 capsule by mouth       gabapentin (NEURONTIN) 600 MG tablet Take 300 mg by mouth At Bedtime        ibuprofen (ADVIL/MOTRIN) 200 MG tablet Take 4  tablets (800 mg) by mouth daily as needed for moderate pain Do not take if you have stomach upset or gastrointestinal bleeding. 180 tablet 3     irbesartan (AVAPRO) 150 MG tablet Take 1 tablet (150 mg) by mouth At Bedtime 90 tablet 3     latanoprost (XALATAN) 0.005 % ophthalmic solution Place 1 drop into both eyes At Bedtime       levothyroxine (SYNTHROID) 137 MCG tablet Take 1 tablet (137 mcg) by mouth daily 90 tablet 3     lurasidone (LATUDA) 60 MG TABS tablet Take by mouth At Bedtime       multivitamin, therapeutic with minerals (THERA-VIT-M) TABS Take 1 tablet by mouth daily 90 each 3     pantoprazole (PROTONIX) 40 MG EC tablet Take 1 tablet (40 mg) by mouth daily 90 tablet 3     polyethylene glycol (MIRALAX) powder Take 17 g by mouth daily 119 g 2     QUEtiapine (SEROQUEL XR) 50 MG TB24 Take 50 mg by mouth At Bedtime Take 1.5 tablets daily.       rosuvastatin (CRESTOR) 40 MG tablet Take 1 tablet (40 mg) by mouth daily 90 tablet 3     solifenacin (VESICARE) 5 MG tablet Take 1 tablet (5 mg) by mouth daily 90 tablet 3     vitamin  B complex with vitamin C (STRESS TAB) TABS Take 1 tablet by mouth daily 90 tablet 1     vortioxetine (TRINTELLIX) 20 MG tablet 20 mg       OTC products: None, except as noted above    Allergies   Allergen Reactions     Contrast Dye Shortness Of Breath     Atorvastatin Calcium      myalgias     Dust Mite Extract Other (See Comments)     Sneezing     Dust Mites Other (See Comments)     Sneezing around dogs and cats     Hydrochlorothiazide      Latex      Oxycodone-Acetaminophen Nausea and Vomiting     Sulfa Drugs      arthralgias     Vicodin [Hydrocodone-Acetaminophen]      Nausea, vomiting      Latex Allergy: NO    Social History     Tobacco Use     Smoking status: Never Smoker     Smokeless tobacco: Never Used   Substance Use Topics     Alcohol use: No     History   Drug Use No       REVIEW OF SYSTEMS:   CONSTITUTIONAL: NEGATIVE for fever, chills, change in weight  ENT/MOUTH: NEGATIVE  for ear, mouth and throat problems  RESP: NEGATIVE for significant cough or SOB  CV: NEGATIVE for chest pain, palpitations or peripheral edema    EXAM:   /78   Pulse 96   Wt 98 kg (216 lb)   SpO2 100%   BMI 38.37 kg/m     GENERAL APPEARANCE: healthy, alert and no distress  HENT: ear canals and TM's normal and nose and mouth without ulcers or lesions  RESP: lungs clear to auscultation - no rales, rhonchi or wheezes  CV: regular rate and rhythm, normal S1 S2, no S3 or S4 and no murmur, click or rub   ABDOMEN: soft, nontender, no HSM or masses and bowel sounds normal  NEURO: Normal strength and tone, sensory exam grossly normal, mentation intact and speech normal    DIAGNOSTICS:   No labs or EKG required for low risk surgery (cataract, skin procedure, breast biopsy, etc)    Recent Labs   Lab Test 10/06/17  0948 09/14/16  1438 06/21/16  1227  02/20/13  1242   HGB 13.2  --  12.7   < > 10.7*     --  240   < > 254   INR  --   --   --   --  1.07     --  143   < > 142   POTASSIUM 3.8  --  3.7   < > 3.9   CR 0.65  --  0.70   < > 0.60   A1C 6.0 5.8 6.1*   < >  --     < > = values in this interval not displayed.        IMPRESSION:   Reason for surgery/procedure: screening colonoscopy    The proposed surgical procedure is considered LOW risk.    REVISED CARDIAC RISK INDEX  The patient has the following serious cardiovascular risks for perioperative complications such as (MI, PE, VFib and 3  AV Block):  No serious cardiac risks  INTERPRETATION: 0 risks: Class I (very low risk - 0.4% complication rate)    The patient has the following additional risks for perioperative complications:  No identified additional risks      ICD-10-CM    1. Preop general physical exam Z01.818        RECOMMENDATIONS:       --Patient is to take all scheduled medications on the day of surgery EXCEPT for modifications listed below.    ACE Inhibitor or Angiotensin Receptor Blocker (ARB) Use  Ace inhibitor or Angiotensin Receptor  Blocker (ARB) and should HOLD this medication for the 24 hours prior to surgery.      APPROVAL GIVEN to proceed with proposed procedure, without further diagnostic evaluation       Signed Electronically by: Jyotsna Emanuel MD    Pt was seen and examined by me; confirmed normal cardiac exam; I agree with the A/P documentation above    Shalonda Mcintyre MD    Copy of this evaluation report is provided to requesting physician.    [place] Preop Guidelines    Revised Cardiac Risk Index    Jyotsna Emanuel MD

## 2020-02-20 NOTE — PROGRESS NOTES
[facility]  [address]  [address] [facility] [id number] [phone]   Dept: [phone]    PRE-OP EVALUATION:  Today's date: 2020    Dara Lamb (: 1955) presents for pre-operative evaluation assessment as requested by [facility].  She requires evaluation and anesthesia risk assessment prior to undergoing surgery/procedure for colonoscopy.    Proposed Surgery/ Procedure: colonoscopy  Date of Surgery/ Procedure: 3/3/2020  Hospital/Surgical Facility: [facility]  Primary Physician: Rosenda Easley  Type of Anesthesia Anticipated: to be determined    Patient has a Health Care Directive or Living Will:  NO    1. NO - Do you have a history of heart attack, stroke, stent, bypass or surgery on an artery in the head, neck, heart or legs?  2. NO - Do you ever have any pain or discomfort in your chest?  3. NO - Do you have a history of  Heart Failure?  4. NO - Are you troubled by shortness of breath when: walking on the level, up a slight hill or at night?  5. NO - Do you currently have a cold, bronchitis or other respiratory infection?  6. NO - Do you have a cough, shortness of breath or wheezing?  7. NO - Do you sometimes get pains in the calves of your legs when you walk?  8. NO - Do you or anyone in your family have previous history of blood clots?  9. NO - Do you or does anyone in your family have a serious bleeding problem such as prolonged bleeding following surgeries or cuts?  10. NO - Have you ever had problems with anemia or been told to take iron pills?  11. NO - Have you had any abnormal blood loss such as black, tarry or bloody stools, or abnormal vaginal bleeding?  12. NO - Have you ever had a blood transfusion?  13. NO - Have you or any of your relatives ever had problems with anesthesia?  14. NO - Do you have sleep apnea, excessive snoring or daytime drowsiness?  15. NO - Do you have any prosthetic heart valves?  16. NO - Do you  have prosthetic joints?  17. NO - Is there any chance that you may be pregnant?      HPI:     HPI related to upcoming procedure: scheduled colonoscopy.  This is for routine screening.  Had prior examination that went well, tolerated prep.  Denies abdominal pain, blood in stool, or weight loss.       MEDICAL HISTORY:     Patient Active Problem List    Diagnosis Date Noted     Urinary urgency 01/18/2019     Priority: Medium     Urge incontinence 06/01/2018     Priority: Medium     Pelvic somatic dysfunction 06/01/2018     Priority: Medium     Class 2 obesity due to excess calories with body mass index (BMI) of 36.0 to 36.9 in adult, unspecified whether serious comorbidity present 10/06/2017     Priority: Medium     Gastroesophageal reflux disease without esophagitis 10/06/2017     Priority: Medium     Low back pain, unspecified back pain laterality, unspecified chronicity, with sciatica presence unspecified 10/06/2017     Priority: Medium     Glaucoma      Priority: Medium     Bipolar affective disorder (H)      Priority: Medium     Primary thyroid papillary carcinoma (H)      Priority: Medium     Abnormal Pap smear and cervical HPV (human papillomavirus)      Priority: Medium     DARIO, conization 9/1990  IMO update changed this record. Please review for accuracy       Breast neoplasm      Priority: Medium     3.9 cm Stage 2B IDCa ER/NM +, HER2- 5/3/11right breast  Problem list name updated by automated process. Provider to review       Cerebral aneurysm, nonruptured      Priority: Medium     ophthalmic branch of left ICA. 2008.  Presented with syncope       Migraine without aura      Priority: Medium     Problem list name updated by automated process. Provider to review       Malignant neoplasm of connective and other soft tissue      Priority: Medium     9/1998  Problem list name updated by automated process. Provider to review       Diverticulitis of colon      Priority: Medium     5/2007  Problem list name updated  by automated process. Provider to review       Essential hypertension, benign      Priority: Medium     Lumbago      Priority: Medium     right spinal canal cyst through L1-2 neuroforamin with large benign root sleeve cyst, [facility]       Spinal stenosis, lumbar region, without neurogenic claudication      Priority: Medium     2003 L4-5       Obstructive sleep apnea      Priority: Medium     Problem list name updated by automated process. Provider to review       Restless legs syndrome (RLS)      Priority: Medium     Breast cancer in female (H) 05/03/2011     Priority: Medium     invasive ductal ca, stage IIB, poorly differentiated,  ER and OR positive, lumpectomy,         Past Medical History:   Diagnosis Date     Abnormal Pap smear and cervical HPV (human papillomavirus)     DARIO, conization 9/1990     Acne      Alcohol abuse      Bipolar affective disorder (H)      Breast cancer in female (H) 05/03/2011    invasive ductal ca, stage IIB, poorly differentiated,  ER and OR positive, lumpectomy,      Cerebral aneurysm, nonruptured     ophthalmic branch of left ICA. 2008.  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     Major depressive disorder      Major depressive disorder, recurrent episode, unspecified     Suicide attempt 10/2000     Menopause     2004     Migraine without aura, without mention of intractable migraine without mention of status migrainosus      Neoplasm of unspecified nature of breast     3.9 cm Stage 2B IDCa ER/OR +, HER2- 5/3/11right breast     Obstructive sleep apnea (adult) (pediatric)      Other and unspecified hyperlipidemia      Postmenopausal bleeding       biopsy negative     Primary thyroid papillary carcinoma (H)      Rectal bleeding     diverticular bleeding , admitted to [facility]     Restless legs syndrome (RLS)      Spinal stenosis, lumbar region, without neurogenic claudication      L4-5     Thyroid cancer (H) 2012    Stage OMAR, papillary ca, s/p thyroidectomy and mod left neck dissection     Past Surgical History:   Procedure Laterality Date      SECTION      , 87, 97     COLECTOMY LEFT      sigmoid colon , diverticulitis, colostomy  reanastomosis     COLONOSCOPY      2009     LAMINECTOMY LUMBAR ONE LEVEL      for cauda equina syndrome, 2006     LAPAROTOMY EXPLORATORY      , for ?bowel obstx r/o pelvic infection     LUMPECTOMY BREAST      right breast 5/3/11     MAMMOPLASTY REDUCTION BILATERAL      11     THYROIDECTOMY       TONSILLECTOMY      10/26/03     WRIST SURGERY       Current Outpatient Medications   Medication Sig Dispense Refill     atomoxetine (STRATTERA) 60 MG capsule Take 100 mg by mouth 2 times daily        calcium carbonate (OS-RUBEN 500 MG [place]. CA) 500 MG tablet Take 2 tablets by mouth daily. (Patient taking differently: Take 1,000 mg by mouth 2 times daily ) 60 tablet 0     cyclobenzaprine (FLEXERIL) 10 MG tablet Take 1 tablet (10 mg) by mouth daily as needed for muscle spasms 90 tablet 3     diltiazem ER COATED BEADS (CARDIZEM CD/CARTIA XT) 240 MG 24 hr capsule Take 1 capsule (240 mg) by mouth daily 90 capsule 3     estradiol (ESTRACE) 0.1 MG/GM vaginal cream Place 2 g vaginally twice a week 42.5 g 3     eszopiclone (LUNESTA) 3 MG tablet TAKE 1 TABLET BY MOUTH AT BEDTIME AS NEEDED FOR SLEEP  2     fish oil-omega-3 fatty acids 1000 MG capsule Take 1 capsule by mouth       gabapentin (NEURONTIN) 600 MG tablet Take 300 mg by mouth At Bedtime        ibuprofen (ADVIL/MOTRIN) 200 MG tablet Take 4 tablets (800 mg) by mouth daily as needed for moderate pain Do not take if you have stomach upset  or gastrointestinal bleeding. 180 tablet 3     irbesartan (AVAPRO) 150 MG tablet Take 1 tablet (150 mg) by mouth At Bedtime 90 tablet 3     latanoprost (XALATAN) 0.005 % ophthalmic solution Place 1 drop into both eyes At Bedtime       levothyroxine (SYNTHROID) 137 MCG tablet Take 1 tablet (137 mcg) by mouth daily 90 tablet 3     lurasidone (LATUDA) 60 MG TABS tablet Take by mouth At Bedtime       multivitamin, therapeutic with minerals (THERA-VIT-M) TABS Take 1 tablet by mouth daily 90 each 3     pantoprazole (PROTONIX) 40 MG EC tablet Take 1 tablet (40 mg) by mouth daily 90 tablet 3     polyethylene glycol (MIRALAX) powder Take 17 g by mouth daily 119 g 2     QUEtiapine (SEROQUEL XR) 50 MG TB24 Take 50 mg by mouth At Bedtime Take 1.5 tablets daily.       rosuvastatin (CRESTOR) 40 MG tablet Take 1 tablet (40 mg) by mouth daily 90 tablet 3     solifenacin (VESICARE) 5 MG tablet Take 1 tablet (5 mg) by mouth daily 90 tablet 3     vitamin  B complex with vitamin C (STRESS TAB) TABS Take 1 tablet by mouth daily 90 tablet 1     vortioxetine (TRINTELLIX) 20 MG tablet 20 mg       OTC products: None, except as noted above    Allergies   Allergen Reactions     Contrast Dye Shortness Of Breath     Atorvastatin Calcium      myalgias     Dust Mite Extract Other (See Comments)     Sneezing     Dust Mites Other (See Comments)     Sneezing around dogs and cats     Hydrochlorothiazide      Latex      Oxycodone-Acetaminophen Nausea and Vomiting     Sulfa Drugs      arthralgias     Vicodin [Hydrocodone-Acetaminophen]      Nausea, vomiting      Latex Allergy: NO    Social History     Tobacco Use     Smoking status: Never Smoker     Smokeless tobacco: Never Used   Substance Use Topics     Alcohol use: No     History   Drug Use No       REVIEW OF SYSTEMS:   CONSTITUTIONAL: NEGATIVE for fever, chills, change in weight  ENT/MOUTH: NEGATIVE for ear, mouth and throat problems  RESP: NEGATIVE for significant cough or SOB  CV: NEGATIVE for  chest pain, palpitations or peripheral edema    EXAM:   /78   Pulse 96   Wt 98 kg (216 lb)   SpO2 100%   BMI 38.37 kg/m    GENERAL APPEARANCE: healthy, alert and no distress  HENT: ear canals and TM's normal and nose and mouth without ulcers or lesions  RESP: lungs clear to auscultation - no rales, rhonchi or wheezes  CV: regular rate and rhythm, normal S1 S2, no S3 or S4 and no murmur, click or rub   ABDOMEN: soft, nontender, no HSM or masses and bowel sounds normal  NEURO: Normal strength and tone, sensory exam grossly normal, mentation intact and speech normal    DIAGNOSTICS:   No labs or EKG required for low risk surgery (cataract, skin procedure, breast biopsy, etc)    Recent Labs   Lab Test 10/06/17  0948 09/14/16  1438 06/21/16  1227  02/20/13  1242   HGB 13.2  --  12.7   < > 10.7*     --  240   < > 254   INR  --   --   --   --  1.07     --  143   < > 142   POTASSIUM 3.8  --  3.7   < > 3.9   CR 0.65  --  0.70   < > 0.60   A1C 6.0 5.8 6.1*   < >  --     < > = values in this interval not displayed.        IMPRESSION:   Reason for surgery/procedure: screening colonoscopy    The proposed surgical procedure is considered LOW risk.    REVISED CARDIAC RISK INDEX  The patient has the following serious cardiovascular risks for perioperative complications such as (MI, PE, VFib and 3  AV Block):  No serious cardiac risks  INTERPRETATION: 0 risks: Class I (very low risk - 0.4% complication rate)    The patient has the following additional risks for perioperative complications:  No identified additional risks      ICD-10-CM    1. Preop general physical exam Z01.818        RECOMMENDATIONS:       --Patient is to take all scheduled medications on the day of surgery EXCEPT for modifications listed below.    ACE Inhibitor or Angiotensin Receptor Blocker (ARB) Use  Ace inhibitor or Angiotensin Receptor Blocker (ARB) and should HOLD this medication for the 24 hours prior to surgery.      APPROVAL GIVEN to  proceed with proposed procedure, without further diagnostic evaluation       Signed Electronically by: Jyotsna Emanuel MD    Pt was seen and examined by me; confirmed normal cardiac exam; I agree with the A/P documentation above    Shalonda Mcintyre MD    Copy of this evaluation report is provided to requesting physician.    [place] Preop Guidelines    Revised Cardiac Risk Index

## 2020-02-20 NOTE — NURSING NOTE
Chief Complaint   Patient presents with     Pre-Op Exam     Colonoscopy DOS: 3/3/2020 with Dr. Mary Reed @ [place]      Kimberly Nissen, EMT at 7:09 AM on 2/20/2020

## 2020-02-22 DIAGNOSIS — I10 ESSENTIAL HYPERTENSION, BENIGN: ICD-10-CM

## 2020-02-25 RX ORDER — DILTIAZEM HYDROCHLORIDE 240 MG/1
CAPSULE, COATED, EXTENDED RELEASE ORAL
Qty: 90 CAPSULE | Refills: 0 | OUTPATIENT
Start: 2020-02-25

## 2020-02-25 NOTE — TELEPHONE ENCOUNTER
DILTIAZEM 24H ER(CD) 240 MG CP  Med sent 2/230/2020      diltiazem ER COATED BEADS 240 MG PO 24 hr capsule 90 capsule 0 2/20/2020  No   Sig - Route: Take 1 capsule (240 mg) by mouth daily - Oral   Sent to pharmacy as: dilTIAZem HCl ER Coated Beads 240 MG Oral Capsule Extended Release 24 Hour (CARDIZEM CD/CARTIA XT)   Class: E-Prescribe   Notes to Pharmacy: Please keep appt 3/26/20   Order: [id number]   E-Prescribing Status: Receipt confirmed by pharmacy (2/20/2020 12:37 PM CST)     Ghislaine Batista RN  Central Triage Red Flags/Med Refills

## 2020-03-12 ENCOUNTER — TELEPHONE (OUTPATIENT)
Dept: INTERNAL MEDICINE | Facility: CLINIC | Age: 65
End: 2020-03-12

## 2020-03-12 NOTE — TELEPHONE ENCOUNTER
[facility] Call Center    Phone Message    May a detailed message be left on voicemail: yes , Pt is wanting to get a call back at confidential line and left a detailed message on voicemail when orders are put in to schedule    Reason for Call: Order(s): Other:   Reason for requested: Blood Work  Date needed: 4/29/2020  Provider name: Rosenda Easley      Action Taken: Message routed to:  [facility] Pcc    Travel Screening: Not Applicable

## 2020-03-12 NOTE — TELEPHONE ENCOUNTER
Called pt left a VM  and advised MD does not draw labs prior to apts in the event something comes up in appointment that would require a second draw and charge to the patient. Katelin Flores Paramedic on 3/12/2020 at 12:58 PM

## 2020-03-15 DIAGNOSIS — E78.5 HYPERLIPIDEMIA LDL GOAL <130: ICD-10-CM

## 2020-03-19 DIAGNOSIS — I10 ESSENTIAL HYPERTENSION, BENIGN: Primary | ICD-10-CM

## 2020-03-19 RX ORDER — ROSUVASTATIN CALCIUM 40 MG/1
40 TABLET, COATED ORAL DAILY
Qty: 90 TABLET | Refills: 0 | Status: SHIPPED | OUTPATIENT
Start: 2020-03-19 | End: 2020-06-11

## 2020-03-22 RX ORDER — IRBESARTAN 150 MG/1
150 TABLET ORAL AT BEDTIME
Qty: 90 TABLET | Refills: 3 | Status: SHIPPED | OUTPATIENT
Start: 2020-03-22 | End: 2021-03-03

## 2020-03-22 NOTE — TELEPHONE ENCOUNTER
Last Clinic Visit: [facility] 2/20/20  Cr and Potassium drawn 1/17/20, results in CareEverywhere, WNL

## 2020-04-08 DIAGNOSIS — M54.50 CHRONIC LOW BACK PAIN WITHOUT SCIATICA, UNSPECIFIED BACK PAIN LATERALITY: Primary | ICD-10-CM

## 2020-04-08 DIAGNOSIS — G89.29 CHRONIC LOW BACK PAIN WITHOUT SCIATICA, UNSPECIFIED BACK PAIN LATERALITY: Primary | ICD-10-CM

## 2020-04-08 NOTE — TELEPHONE ENCOUNTER
cyclobenzaprine (FLEXERIL) 10 MG tablet       Last Written Prescription Date:  2-22-19  Last Fill Quantity: 90,   # refills: 32  Last Office Visit : 2-  Future Office visit:  7-    Routing refill request to provider for review/approval because:  Not on protocol.      Kathleen M Doege RN

## 2020-04-08 NOTE — TELEPHONE ENCOUNTER
[facility] Call Center    Phone Message    May a detailed message be left on voicemail: yes     Reason for Call: Medication Refill Request    Has the patient contacted the pharmacy for the refill? Yes   Name of medication being requested:   * cyclobenzaprine (FLEXERIL) 10 MG tablet   Provider who prescribed the medication: Rosenda Easley,  Pharmacy: [facility] #[id number] - [place], [place] - [address]   Date medication is needed: 4/8/2020, Patient is needing ASAP, Patient has been out for a few weeks .         Action Taken: Message routed to:  [facility] Med Refill    Travel Screening: Not Applicable

## 2020-04-09 RX ORDER — CYCLOBENZAPRINE HCL 10 MG
10 TABLET ORAL DAILY PRN
Qty: 90 TABLET | Refills: 3 | Status: SHIPPED | OUTPATIENT
Start: 2020-04-09 | End: 2021-06-02

## 2020-04-29 DIAGNOSIS — R39.15 URINARY URGENCY: ICD-10-CM

## 2020-04-30 NOTE — TELEPHONE ENCOUNTER
solifenacin (VESICARE) 5 MG  Last Written Prescription Date:  1/17/19  Last Fill Quantity: 90,   # refills: 3  Last Office Visit : 1/17/19  Future Office visit:  NONE    Routing refill request to provider for review/approval because:  diagnosis of glaucoma on the problem list  30 DAY RF  ? OVER DUE APPT    Scheduling has been notified to contact the pt for appointment.

## 2020-05-01 RX ORDER — SOLIFENACIN SUCCINATE 5 MG/1
5 TABLET, FILM COATED ORAL DAILY
Qty: 30 TABLET | Refills: 0 | Status: SHIPPED | OUTPATIENT
Start: 2020-05-01

## 2020-05-04 ENCOUNTER — TELEPHONE (OUTPATIENT)
Dept: UROLOGY | Facility: CLINIC | Age: 65
End: 2020-05-04

## 2020-05-04 NOTE — TELEPHONE ENCOUNTER
Left message for pt to call and schedule a follow up appt with Dr. Prescott, for a med check, next available virtual visit.

## 2020-05-17 DIAGNOSIS — I10 ESSENTIAL HYPERTENSION, BENIGN: ICD-10-CM

## 2020-05-19 NOTE — TELEPHONE ENCOUNTER
DILTIAZEM 24H ER(CD) 240 MG CP     Last Written Prescription Date:  2/20/2020  Last Fill Quantity: 90,   # refills: 0  Last Office Visit : 2/20/2020  Future Office visit:  7/29/2020    Routing refill request to provider for review/approval because:  Over Due Labs:   CR, & ALT     30 day pended  Nurse - this is a second refill request for medication with out or overdue labs. With last request pt was given 90 day kaleb and Prachi notified.      Ghislaine Batista RN  Central Triage Red Flags/Med Refills      Recent Labs   Lab Test 10/06/17  0948   ALT 33            Normal serum creatinine on file in past 12 months         Recent Labs   Lab Test 10/06/17  0948   CR 0.65

## 2020-05-21 RX ORDER — DILTIAZEM HYDROCHLORIDE 240 MG/1
240 CAPSULE, COATED, EXTENDED RELEASE ORAL DAILY
Qty: 90 CAPSULE | Refills: 0 | Status: SHIPPED | OUTPATIENT
Start: 2020-05-21 | End: 2020-07-28

## 2020-06-02 DIAGNOSIS — R39.15 URINARY URGENCY: ICD-10-CM

## 2020-06-04 RX ORDER — SOLIFENACIN SUCCINATE 5 MG/1
5 TABLET, FILM COATED ORAL DAILY
Qty: 15 TABLET | Refills: 0 | OUTPATIENT
Start: 2020-06-04

## 2020-06-04 NOTE — TELEPHONE ENCOUNTER
solifenacin (VESICARE) 5 MG tablet     Last Written Prescription Date:  5/1/20  Last Fill Quantity: 30,   # refills: 0  Last Office Visit : 1/17/19  Future Office visit:  None    Scheduling has been notified to contact the pt for appointment.    RTC one year, sooner if needed    Routing refill request to provider for review/approval because: lv > 18 mths. Josseline teran 5/1/20. Refuse?

## 2020-06-04 NOTE — TELEPHONE ENCOUNTER
Please contact patient and help her set up a virtual visit with Dr. Prescott in order to receive any further refills.

## 2020-06-09 ENCOUNTER — TELEPHONE (OUTPATIENT)
Dept: UROLOGY | Facility: CLINIC | Age: 65
End: 2020-06-09

## 2020-06-09 DIAGNOSIS — E78.5 HYPERLIPIDEMIA LDL GOAL <130: ICD-10-CM

## 2020-06-11 RX ORDER — ROSUVASTATIN CALCIUM 40 MG/1
40 TABLET, COATED ORAL DAILY
Qty: 90 TABLET | Refills: 0 | Status: SHIPPED | OUTPATIENT
Start: 2020-06-11 | End: 2020-07-28

## 2020-06-11 NOTE — TELEPHONE ENCOUNTER
Last Clinic Visit: 2/20/2020  [facility]  Overdue lab: LDL  FYI to clinic CMA  RF 90 day  NCV 7-29-20

## 2020-06-25 DIAGNOSIS — R39.15 URINARY URGENCY: ICD-10-CM

## 2020-06-30 RX ORDER — SOLIFENACIN SUCCINATE 5 MG/1
5 TABLET, FILM COATED ORAL DAILY
OUTPATIENT
Start: 2020-06-30

## 2020-06-30 NOTE — TELEPHONE ENCOUNTER
Left message for pt to call and schedule a virtual visit follow up with Dr. Prescott to discuss medications.

## 2020-06-30 NOTE — TELEPHONE ENCOUNTER
Hello,  Please help patient set up a follow up visit with Dr. Prescott for med refill.  Jyotsna Albarran LPN

## 2020-06-30 NOTE — TELEPHONE ENCOUNTER
SOLIFENACIN 5 MG TABLET     Last Written Prescription Date:  5/1/2020  Last Fill Quantity: 30,   # refills: 0  Last Office Visit : 1/17/2019  Future Office visit:  None    Routing refill request to provider for review/approval because:  Only 30 day supply last order to pharm?  Pt due for office visit? Was reminded to call.    No office visit scheduled?     Refer to clinic for review.  Nurse - this is a second refill request for medication with out office visit or labs due. With last request pt was given 30 day kaleb and clinic notified.      Ghislaine Batista RN  Central Triage Red Flags/Med Refills

## 2020-07-28 PROBLEM — D64.9 ANEMIA, UNSPECIFIED TYPE: Status: ACTIVE | Noted: 2020-07-28

## 2020-07-28 PROBLEM — E66.9 OBESITY WITH BODY MASS INDEX (BMI) OF 30.0 TO 39.9: Status: ACTIVE | Noted: 2020-07-28

## 2020-07-28 PROBLEM — R79.89 ELEVATED LFTS: Status: ACTIVE | Noted: 2020-07-28

## 2020-07-28 NOTE — PROGRESS NOTES
CC:  Routine physical    Other health care team members:  Oncology Dr. Edgar Moore  Urology Dr. AILEEN Prescott  Optometry Dr. ANGIE Chaudhary  Endocrinology Dr. Enoc Farnsworth    HPI:  65 year old female  Hypertension  Hyperlipidemia  Hx breast cancer, followed by Dr. Moore  Hx thyroid cancer met to left neck , s/p surgery, I-131, subsequent hypothyroidism, followed by Dr. Farnsworth  NAIMA  RLS  Bipolar DO  Obesity  Chronic LBP  Urge incontinence/urgency    HCM reviewed:  Colonoscopy 3/3/20  Impressions/Post-Op Diagnosis:  - Mild diverticulosis in the entire examined colon. There was no   evidence of diverticular bleeding.  - The examined portion of the ileum was normal.  - Non-bleeding internal hemorrhoids.  - No specimens collected.    Recommendation:  - Repeat colonoscopy in 2 years because the bowel preparation was   suboptimal.  - You should do a Golytely prep for future colonoscopies ( or a double   prep but you indicated you preferrred the one day Golytely prep)    Soft tissue neck US 20 negative    DXA  lowest T score -1.4 left FN    ROS:    ED visit 20 ABNW for chest and abd pain x 2 days.  LFT's elevated.  Fatty liver on US.  CT abd/pelvis negative.  Hepatitis serologies negative. Gen surgery consultation,  HIDA with midly delayed filling of GB, low GB ejection fraction.  Had flare starting 4 days ago, constant, right sided, now improving.  No associated symptoms.  Weight gain discussed.  Brother  of esophageal cancer age 61  Mother with breast cancer  Otherwise 10 point ROS negative.      Patient Active Problem List   Diagnosis     Abnormal Pap smear and cervical HPV (human papillomavirus)     Breast neoplasm     Cerebral aneurysm, nonruptured     Migraine without aura     Malignant neoplasm of connective and other soft tissue     Diverticulitis of colon     Essential hypertension, benign     Lumbago     Spinal stenosis, lumbar region, without neurogenic claudication     Obstructive sleep apnea      Restless legs syndrome (RLS)     Primary thyroid papillary carcinoma (H)     Bipolar affective disorder (H)     Glaucoma     Class 2 obesity due to excess calories with body mass index (BMI) of 36.0 to 36.9 in adult, unspecified whether serious comorbidity present     Gastroesophageal reflux disease without esophagitis     Low back pain, unspecified back pain laterality, unspecified chronicity, with sciatica presence unspecified     Breast cancer in female (H)     Urge incontinence     Pelvic somatic dysfunction     Urinary urgency     Past Surgical History:   Procedure Laterality Date      SECTION      , 87, 97     COLECTOMY LEFT      sigmoid colon , diverticulitis, colostomy  reanastomosis     COLONOSCOPY      2009     LAMINECTOMY LUMBAR ONE LEVEL      for cauda equina syndrome, 2006     LAPAROTOMY EXPLORATORY      , for ?bowel obstx r/o pelvic infection     LUMPECTOMY BREAST      right breast 5/3/11     MAMMOPLASTY REDUCTION BILATERAL      11     THYROIDECTOMY       TONSILLECTOMY      10/26/03     WRIST SURGERY       Family History   Problem Relation Age of Onset     Prostate Cancer Father      Alcohol/Drug Father      Lipids Father      Cerebrovascular Disease Father         incidental finding on MRI     Alcohol/Drug Brother      Psychotic Disorder Son         autism     Psychotic Disorder Other         ADD (3 children)     C.A.D. Brother         2 MI's with stents     Psychotic Disorder Sister         depression     Psychotic Disorder Brother         depression     Hypertension Mother      Breast Cancer Mother         age 80's     Blood Disease Maternal Grandmother         DVT, PE     Cancer Maternal Grandmother      Heart Disease Brother         Aortic and mitral valve replacement     Asthma Sister      Esophageal Cancer Brother         diet age 61     Social History     Tobacco Use     Smoking status: Never Smoker     Smokeless tobacco: Never Used   Substance Use Topics      Alcohol use: No     Drug use: No     Social History     Social History Narrative    .  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  Has significant (Bari). Three children all with ADD.  Son with autism (lives with her).  Daughter, age 21, graduating from "[employer]" this year. No tobacco use.  Previous excessive alcohol use, now in remission.           Current Outpatient Medications   Medication Sig Dispense Refill     atomoxetine (STRATTERA) 60 MG capsule Take 100 mg by mouth 2 times daily        calcium carbonate (OS-RUBEN 500 MG [place]. [place]) 500 MG tablet Take 2 tablets by mouth daily. (Patient taking differently: Take 1,000 mg by mouth 2 times daily ) 60 tablet 0     cyclobenzaprine (FLEXERIL) 10 MG tablet Take 1 tablet (10 mg) by mouth daily as needed for muscle spasms 90 tablet 3     diltiazem ER COATED BEADS (CARDIZEM CD/CARTIA XT) 240 MG 24 hr capsule Take 1 capsule (240 mg) by mouth daily 90 capsule 0     estradiol (ESTRACE) 0.1 MG/GM vaginal cream Place 2 g vaginally twice a week 42.5 g 3     eszopiclone (LUNESTA) 3 MG tablet TAKE 1 TABLET BY MOUTH AT BEDTIME AS NEEDED FOR SLEEP  2     fish oil-omega-3 fatty acids 1000 MG capsule Take 1 capsule by mouth       gabapentin (NEURONTIN) 600 MG tablet Take 300 mg by mouth At Bedtime        ibuprofen (ADVIL/MOTRIN) 200 MG tablet Take 4 tablets (800 mg) by mouth daily as needed for moderate pain Do not take if you have stomach upset or gastrointestinal bleeding. 180 tablet 3     irbesartan (AVAPRO) 150 MG tablet Take 1 tablet (150 mg) by mouth At Bedtime 90 tablet 3     latanoprost (XALATAN) 0.005 % ophthalmic solution Place 1 drop into both eyes At Bedtime       levothyroxine (SYNTHROID) 137 MCG tablet Take 1 tablet (137 mcg) by mouth daily 90 tablet 3     lurasidone (LATUDA) 60 MG TABS tablet Take by mouth At Bedtime       multivitamin, therapeutic with minerals  "(THERA-VIT-M) TABS Take 1 tablet by mouth daily 90 each 3     pantoprazole 40 MG PO EC tablet Take 1 tablet (40 mg) by mouth daily 90 tablet 3     polyethylene glycol (MIRALAX) powder Take 17 g by mouth daily 119 g 2     QUEtiapine (SEROQUEL XR) 50 MG TB24 Take 50 mg by mouth At Bedtime Take 1.5 tablets daily.       rosuvastatin (CRESTOR) 40 MG tablet Take 1 tablet (40 mg) by mouth daily Please keep 7-29-20 clinic appt, lab due. 90 tablet 0     solifenacin (VESICARE) 5 MG tablet Take 1 tablet (5 mg) by mouth daily * SCHEDULE CLINIC APPT.* 30 tablet 0     vitamin  B complex with vitamin C (STRESS TAB) TABS Take 1 tablet by mouth daily 90 tablet 1     vortioxetine (TRINTELLIX) 20 MG tablet 20 mg       Allergies   Allergen Reactions     Contrast Dye Shortness Of Breath     Atorvastatin Calcium      myalgias     Dust Mite Extract Other (See Comments)     Sneezing     Dust Mites Other (See Comments)     Sneezing around dogs and cats     Hydrochlorothiazide      Latex      Oxycodone-Acetaminophen Nausea and Vomiting     Sulfa Drugs      arthralgias     Vicodin [Hydrocodone-Acetaminophen]      Nausea, vomiting     BP Readings from Last 6 Encounters:   02/20/20 116/78   02/22/19 113/78   01/17/19 120/50   04/26/18 124/79   01/25/18 123/86   10/06/17 115/79     Estimated body mass index is 38.37 kg/m  as calculated from the following:    Height as of 2/22/19: 1.598 m (5' 2.91\").    Weight as of 2/20/20: 98 kg (216 lb).    /79   Pulse 99   Temp 99  F (37.2  C) (Oral)   Ht 1.613 m (5' 3.5\")   Wt 99.8 kg (220 lb)   SpO2 100%   BMI 38.36 kg/m      Physical Examination:    General:  Conversant, generally healthy appearing, no acute distress  Head: atraumatic  Eyes:  Pupils 2-3 mm, sclera white, EOM's full, conjunctiva moist, no periorbital swelling    Ears:  TM's obscured by cerumen bilaterally  Nose:  Nasal passages clear, turbinates not swollen  Throat/Mouth:  No pharyngeal erythema, exudate, ulcers, oral mucosa and " tongue moist, normal hard and soft palate  Neck:  Trachea midline, Full AROM, supple, thyroid absent, no neck lymphadenopathy  Lungs:  Clear to auscultation throughout, no wheezes, rhonchi or rales. Normal respiratory effort and no intercostal retractions.  C/V:  Regular rate and rhythm, no murmurs, rubs or gallops.    Abdomen:  Not distended.  Bowel sounds active.  Deep palpation tenderness RUQ w/o Santiago sign, no hepatosplenomegaly or masses.  No CVA tenderness or masses.  Lymph:  No cervical lymph nodes.  Neuro: Alert and oriented, face symmetric. Able to get on/off exam table without assistance.  Strength grossly intact. No tremor.  M/S:   No joint deformities noted.  No joint swelling.  Skin:   Normal temperature., turgor and texture. No rashes, suspicious lesions, or jaundice on exposed skin surfaces.   Extremities:  No peripheral edema, no digital cyanosis  Psych:  Alert and oriented. Appropriate affect.  Not psychomotor slowed.  No signs of anxiety or agitation.    Dara was seen today for physical and pain.    Diagnoses and all orders for this visit:  Routine history and physical examination of adult    Benign essential hypertension  -     Lipid panel reflex to direct LDL Fasting; Future  -     Hemoglobin A1c; Future  -     CBC with platelets differential; Future  -     Ferritin; Future  -     Iron and iron binding capacity; Future  -     Hepatic panel; Future  -     Basic metabolic panel; Future  -     TSH with free T4 reflex; Future    Obesity with body mass index (BMI) of 30.0 to 39.9  -     WEIGHT MANAGEMENT/ P LIFESTYLE PROGRAM REFERRAL    Elevated LFTs  -     Hepatic panel; Future    Anemia, unspecified type  -     CBC with platelets differential; Future  -     Ferritin; Future  -     Iron and iron binding capacity; Future    Asymptomatic menopausal state  -     Dexa hip/pelvis/spine*; Future    Essential hypertension, benign  -     diltiazem ER COATED BEADS (CARDIZEM CD/CARTIA XT) 240 MG 24 hr  capsule; Take 1 capsule (240 mg) by mouth daily    Mixed hyperlipidemia  -     rosuvastatin (CRESTOR) 40 MG tablet; Take 1 tablet (40 mg) by mouth daily    Abdominal pain, right upper quadrant  -     GENERAL SURG ADULT REFERRAL; Future    F/U one year and prn.    Rosenda Easley M.D.  Internal Medicine  [facility]   pager [phone]

## 2020-07-29 ENCOUNTER — OFFICE VISIT (OUTPATIENT)
Dept: INTERNAL MEDICINE | Facility: CLINIC | Age: 65
End: 2020-07-29
Payer: COMMERCIAL

## 2020-07-29 VITALS
SYSTOLIC BLOOD PRESSURE: 111 MMHG | OXYGEN SATURATION: 100 % | DIASTOLIC BLOOD PRESSURE: 79 MMHG | HEIGHT: 64 IN | TEMPERATURE: 99 F | BODY MASS INDEX: 37.56 KG/M2 | WEIGHT: 220 LBS | HEART RATE: 99 BPM

## 2020-07-29 DIAGNOSIS — R79.89 ELEVATED LFTS: ICD-10-CM

## 2020-07-29 DIAGNOSIS — I10 BENIGN ESSENTIAL HYPERTENSION: ICD-10-CM

## 2020-07-29 DIAGNOSIS — R10.11 ABDOMINAL PAIN, RIGHT UPPER QUADRANT: ICD-10-CM

## 2020-07-29 DIAGNOSIS — E78.2 MIXED HYPERLIPIDEMIA: ICD-10-CM

## 2020-07-29 DIAGNOSIS — I10 ESSENTIAL HYPERTENSION, BENIGN: ICD-10-CM

## 2020-07-29 DIAGNOSIS — I10 BENIGN ESSENTIAL HYPERTENSION: Primary | ICD-10-CM

## 2020-07-29 DIAGNOSIS — Z78.0 ASYMPTOMATIC MENOPAUSAL STATE: ICD-10-CM

## 2020-07-29 DIAGNOSIS — Z00.00 ROUTINE HISTORY AND PHYSICAL EXAMINATION OF ADULT: ICD-10-CM

## 2020-07-29 DIAGNOSIS — D64.9 ANEMIA, UNSPECIFIED TYPE: ICD-10-CM

## 2020-07-29 DIAGNOSIS — E66.9 OBESITY WITH BODY MASS INDEX (BMI) OF 30.0 TO 39.9: ICD-10-CM

## 2020-07-29 LAB
ALBUMIN SERPL-MCNC: 3.8 G/DL (ref 3.4–5)
ALP SERPL-CCNC: 113 U/L (ref 40–150)
ALT SERPL W P-5'-P-CCNC: 28 U/L (ref 0–50)
ANION GAP SERPL CALCULATED.3IONS-SCNC: 6 MMOL/L (ref 3–14)
AST SERPL W P-5'-P-CCNC: 26 U/L (ref 0–45)
BASOPHILS # BLD AUTO: 0 10E9/L (ref 0–0.2)
BASOPHILS NFR BLD AUTO: 0.8 %
BILIRUB DIRECT SERPL-MCNC: 0.1 MG/DL (ref 0–0.2)
BILIRUB SERPL-MCNC: 0.4 MG/DL (ref 0.2–1.3)
BUN SERPL-MCNC: 10 MG/DL (ref 7–30)
CALCIUM SERPL-MCNC: 8.8 MG/DL (ref 8.5–10.1)
CHLORIDE SERPL-SCNC: 108 MMOL/L (ref 94–109)
CHOLEST SERPL-MCNC: 186 MG/DL
CO2 SERPL-SCNC: 25 MMOL/L (ref 20–32)
CREAT SERPL-MCNC: 0.64 MG/DL (ref 0.52–1.04)
DIFFERENTIAL METHOD BLD: ABNORMAL
EOSINOPHIL # BLD AUTO: 0.2 10E9/L (ref 0–0.7)
EOSINOPHIL NFR BLD AUTO: 4 %
ERYTHROCYTE [DISTWIDTH] IN BLOOD BY AUTOMATED COUNT: 14.6 % (ref 10–15)
FERRITIN SERPL-MCNC: 49 NG/ML (ref 8–252)
GFR SERPL CREATININE-BSD FRML MDRD: >90 ML/MIN/{1.73_M2}
GLUCOSE SERPL-MCNC: 107 MG/DL (ref 70–99)
HBA1C MFR BLD: 6 % (ref 0–5.6)
HCT VFR BLD AUTO: 42.3 % (ref 35–47)
HDLC SERPL-MCNC: 111 MG/DL
HGB BLD-MCNC: 13.8 G/DL (ref 11.7–15.7)
IMM GRANULOCYTES # BLD: 0.1 10E9/L (ref 0–0.4)
IMM GRANULOCYTES NFR BLD: 1.3 %
IRON SATN MFR SERPL: 14 % (ref 15–46)
IRON SERPL-MCNC: 47 UG/DL (ref 35–180)
LDLC SERPL CALC-MCNC: 65 MG/DL
LYMPHOCYTES # BLD AUTO: 1 10E9/L (ref 0.8–5.3)
LYMPHOCYTES NFR BLD AUTO: 27.1 %
MCH RBC QN AUTO: 30.3 PG (ref 26.5–33)
MCHC RBC AUTO-ENTMCNC: 32.6 G/DL (ref 31.5–36.5)
MCV RBC AUTO: 93 FL (ref 78–100)
MONOCYTES # BLD AUTO: 0.4 10E9/L (ref 0–1.3)
MONOCYTES NFR BLD AUTO: 11.7 %
NEUTROPHILS # BLD AUTO: 2.1 10E9/L (ref 1.6–8.3)
NEUTROPHILS NFR BLD AUTO: 55.1 %
NONHDLC SERPL-MCNC: 75 MG/DL
NRBC # BLD AUTO: 0 10*3/UL
NRBC BLD AUTO-RTO: 0 /100
PLATELET # BLD AUTO: 201 10E9/L (ref 150–450)
POTASSIUM SERPL-SCNC: 3.9 MMOL/L (ref 3.4–5.3)
PROT SERPL-MCNC: 8.1 G/DL (ref 6.8–8.8)
RBC # BLD AUTO: 4.55 10E12/L (ref 3.8–5.2)
SODIUM SERPL-SCNC: 139 MMOL/L (ref 133–144)
T4 FREE SERPL-MCNC: 0.98 NG/DL (ref 0.76–1.46)
TIBC SERPL-MCNC: 343 UG/DL (ref 240–430)
TRIGL SERPL-MCNC: 49 MG/DL
TSH SERPL DL<=0.005 MIU/L-ACNC: 0.28 MU/L (ref 0.4–4)
WBC # BLD AUTO: 3.8 10E9/L (ref 4–11)

## 2020-07-29 RX ORDER — DILTIAZEM HYDROCHLORIDE 240 MG/1
240 CAPSULE, COATED, EXTENDED RELEASE ORAL DAILY
Qty: 90 CAPSULE | Refills: 3 | Status: SHIPPED | OUTPATIENT
Start: 2020-07-29 | End: 2021-07-15

## 2020-07-29 RX ORDER — ROSUVASTATIN CALCIUM 40 MG/1
40 TABLET, COATED ORAL DAILY
Qty: 90 TABLET | Refills: 3 | Status: SHIPPED | OUTPATIENT
Start: 2020-07-29 | End: 2021-07-15

## 2020-07-29 ASSESSMENT — MIFFLIN-ST. JEOR: SCORE: 1519.97

## 2020-07-29 ASSESSMENT — PAIN SCALES - GENERAL: PAINLEVEL: MODERATE PAIN (5)

## 2020-07-29 NOTE — NURSING NOTE
Chief Complaint   Patient presents with     Physical     pt here for physical     Pain     pain 5/10 since saturday morning, constant, right abdomen. [facility] encounter [facility]17[facility])      Kimberly Nissen, EMT at 10:47 AM on 7/29/2020

## 2020-07-29 NOTE — PATIENT INSTRUCTIONS
Call to order DEXA: [phone]      Weight Management [phone] (4th [facility]     General Surgery [phone] (4th Floor [facility])

## 2020-08-02 DIAGNOSIS — K21.9 GASTROESOPHAGEAL REFLUX DISEASE WITHOUT ESOPHAGITIS: ICD-10-CM

## 2020-08-06 RX ORDER — PANTOPRAZOLE SODIUM 40 MG/1
TABLET, DELAYED RELEASE ORAL
Qty: 90 TABLET | Refills: 3 | OUTPATIENT
Start: 2020-08-06

## 2020-08-18 ENCOUNTER — PATIENT OUTREACH (OUTPATIENT)
Dept: SURGERY | Facility: CLINIC | Age: 65
End: 2020-08-18

## 2020-08-18 NOTE — PROGRESS NOTES
A referral was placed for this patient to consult with a surgeon regarding gallbladder concerns.  Attempted to reach patient x 2 with no answer. LM on VM to call scheduling line.

## 2020-08-19 ENCOUNTER — TELEPHONE (OUTPATIENT)
Dept: INTERNAL MEDICINE | Facility: CLINIC | Age: 65
End: 2020-08-19

## 2020-11-28 ENCOUNTER — AMBULATORY - HEALTHEAST (OUTPATIENT)
Dept: FAMILY MEDICINE | Facility: CLINIC | Age: 65
End: 2020-11-28

## 2020-11-28 ENCOUNTER — VIRTUAL VISIT (OUTPATIENT)
Dept: FAMILY MEDICINE | Facility: OTHER | Age: 65
End: 2020-11-28
Payer: COMMERCIAL

## 2020-11-28 DIAGNOSIS — Z20.822 SUSPECTED 2019 NOVEL CORONAVIRUS INFECTION: ICD-10-CM

## 2020-11-28 PROCEDURE — 99421 OL DIG E/M SVC 5-10 MIN: CPT | Performed by: FAMILY MEDICINE

## 2020-11-28 NOTE — PROGRESS NOTES
"Date: 2020 09:08:32  Clinician: Winston Miller  Clinician NPI: [id number]  Patient: Dara Lamb  Patient : 1955  Patient Address: [address] [place] [id number]  Patient Phone: [phone]  Visit Protocol: URI  Patient Summary:  Dara is a 65 year old ( : 1955 ) female who initiated a [facility] Visit for COVID-19 (Coronavirus) evaluation and screening. When asked the question \"Please sign me up to receive news, health information and promotions from [facility].\", Dara responded \"Yes\".    When asked when her symptoms started, Dara reported that she does not have any symptoms.   She denies taking antibiotic medication in the past month and having recent facial or sinus surgery in the past 60 days.    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 In the past 14 days, Dara has worked or volunteered at a hospital or a clinic. 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   In the past 14 days, she has not lived in a congregate living setting.   Dara has had a close contact with a laboratory-confirmed COVID-19 patient in the last 14 days. Date Dara was exposed to the laboratory-confirmed COVID-19 patient: 2020   Additional information about contact with COVID-19 (Coronavirus) patient as reported by the patient (free text): I live with my 23 year old daughter who works at a group home. She was exposed to a Covid positive worker on 2020. She became symptomatic on 2020 and tested positive for COVID yesterday. I am wanting to be tested.   Dara is living in the same household with the COVID-19 positive patient. She was in an enclosed space for greater than 15 minutes with the COVID-19 patient.   During the encounter, neither were wearing masks.   " Since December 2019, Dara has not been tested for COVID-19 and has not had upper respiratory infection or influenza-like illness.   Pertinent medical history  She has not been told by her provider to avoid NSAIDs.   Dara does not get yeast infections when she takes antibiotics.   Dara does not have diabetes. She denies having immunosuppressive conditions (e.g., chemotherapy, HIV, organ transplant, long-term use of steroids or other immunosuppressive medications, splenectomy). She does not have severe COPD and congestive heart failure. She does not have asthma.   Dara needs a return to work/school note.   Weight: 223 lbs   Dara does not smoke or use smokeless tobacco.   Weight: 223 lbs    MEDICATIONS: cyclobenzaprine oral, gabapentin oral, Seroquel oral, Latuda oral, irbesartan oral, Lunesta oral, Trintellix oral, diltiazem oral, Centrum oral, Super B Complex-Vitamin C oral, Fish Oil oral, atomoxetine oral, Synthroid oral, pantoprazole oral, Os-[place] 500 + D3 oral, Vesicare oral, ALLERGIES: Sulfa (Sulfonamide Antibiotics), Latex, Natural Rubber, Gadolinium-Containing Contrast Media  Clinician Response:  Dear Dara,   Based on your exposure to COVID-19 (coronavirus), we would like to test you for this virus.  1. Please call [phone] to schedule your visit. Explain that you were referred by [facility] to have a COVID-19 test. Be ready to share your [facility] visit ID number.  * If you need to schedule in [employer] please call [phone] or for [employer] employees please call [phone].   * If you need to schedule in the [place] area please call [phone]. [employer] employees call [phone].   The following will serve as your written order for this COVID Test, ordered by me, for the indication of suspected COVID [Z20.828]: The test will be ordered in [employer], our electronic health record, after you are scheduled. It will show as ordered and authorized by Michel Garcia MD.  Order: COVID-19 (coronavirus)  PCR for ASYMPTOMATIC EXPOSURE testing from [facility].   If you know you have had close contact with someone who tested positive, you should be quarantined for 14 days after this exposure. You should stay in quarantine for the14 days even if the covid test is negative, the optimal time to test after exposure is 5-7 days from the exposure  Quarantine means   What should I do?  For safety, it's very important to follow these rules. Do this for 14 days after the date you were last exposed to the virus..  Stay home and away from others. Don't go to school or anywhere else. Generally quarantine means staying home from work but there are some exceptions to this. Please contact your workplace.   No hugging, kissing or shaking hands.  Don't let anyone visit.  Cover your mouth and nose with a mask, tissue or washcloth to avoid spreading germs.  Wash your hands and face often. Use soap and water.  What are the symptoms of COVID-19?  The most common symptoms are cough, fever and trouble breathing. Less common symptoms include headache, body aches, fatigue (feeling very tired), chills, sore throat, stuffy or runny nose, diarrhea (loose poop), loss of taste or smell, belly pain, and nausea or vomiting (feeling sick to your stomach or throwing up).  After 14 days, if you have still don't have symptoms, you likely don't have this virus.  If you develop symptoms, follow these guidelines.  If you're normally healthy: Please start another [facility] visit to report your symptoms. Go to [email or web address].  If you have a serious health problem (like cancer, heart failure, an organ transplant or kidney disease): Call your specialty clinic. Let them know that you might have COVID-19.  2. When it's time for your COVID test:  Stay at least 6 feet away from others. (If someone will drive you to your test, stay in the backseat, as far away from the  as you can.)  Cover your mouth and nose with a mask, tissue or washcloth.  Go straight to the testing  site. Don't make any stops on the way there or back.  Please note  Caregivers in these groups are at risk for severe illness due to COVID-19:  o People 65 years and older  o People who live in a nursing home or long-term care facility  o People with chronic disease (lung, heart, cancer, diabetes, kidney, liver, immunologic)  o People who have a weakened immune system, including those who:  Are in cancer treatment  Take medicine that weakens the immune system, such as corticosteroids  Had a bone marrow or organ transplant  Have an immune deficiency  Have poorly controlled HIV or AIDS  Are obese (body mass index of 40 or higher)  Smoke regularly  Where can I get more information?  [facility] -- About COVID-19: [email or web address]  CDC -- What to Do If You're Sick: www.cdc.gov/coronavirus/2019-ncov/about/steps-when-sick.html  [facility] -- Ending Home Isolation: www.cdc.gov/coronavirus/2019-ncov/hcp/disposition-in-home-patients.html  [facility] -- Caring for Someone: www.cdc.gov/coronavirus/2019-ncov/if-you-are-sick/care-for-someone.html  [facility] -- Interim Guidance for Hospital Discharge to Home: www.health.[facility].mn.us/diseases/coronavirus/hcp/hospdischarge.pdf  [facility] clinical trials (COVID-19 research studies): clinicalaffairs.[facility]-clinical-trials  Below are the COVID-19 hotlines at the [facility] ([facility]). Interpreters are available.  For health questions: Call 924-599-5987 or 1-388.741.8427 (7 a.m. to 7 p.m.)  For questions about schools and childcare: Call 675-518-8547 or 1-239.539.2719 (7 a.m. to 7 p.m.)    Diagnosis: Contact with and (suspected) exposure to other viral communicable diseases  Diagnosis ICD: Z20.828

## 2020-11-29 ENCOUNTER — AMBULATORY - HEALTHEAST (OUTPATIENT)
Dept: FAMILY MEDICINE | Facility: CLINIC | Age: 65
End: 2020-11-29

## 2020-11-29 DIAGNOSIS — Z20.822 SUSPECTED 2019 NOVEL CORONAVIRUS INFECTION: ICD-10-CM

## 2020-12-01 ENCOUNTER — COMMUNICATION - HEALTHEAST (OUTPATIENT)
Dept: SCHEDULING | Facility: CLINIC | Age: 65
End: 2020-12-01

## 2021-03-02 DIAGNOSIS — I10 ESSENTIAL HYPERTENSION, BENIGN: ICD-10-CM

## 2021-03-03 RX ORDER — IRBESARTAN 150 MG/1
150 TABLET ORAL AT BEDTIME
Qty: 90 TABLET | Refills: 0 | Status: SHIPPED | OUTPATIENT
Start: 2021-03-03 | End: 2021-06-02

## 2021-03-03 NOTE — TELEPHONE ENCOUNTER
irbesartan (AVAPRO) 150 MG tablet    Take 1 tablet (150 mg) by mouth At Bedtime -     Last Written Prescription Date:  3/22/20  Last Fill Quantity: 90,   # refills: 3  Last Office Visit : 7/29/20  F/U one year and prn.  Future Office visit:  none    Refill to pharmacy.

## 2021-05-28 DIAGNOSIS — G89.29 CHRONIC LOW BACK PAIN WITHOUT SCIATICA, UNSPECIFIED BACK PAIN LATERALITY: ICD-10-CM

## 2021-05-28 DIAGNOSIS — I10 ESSENTIAL HYPERTENSION, BENIGN: ICD-10-CM

## 2021-05-28 DIAGNOSIS — M54.50 CHRONIC LOW BACK PAIN WITHOUT SCIATICA, UNSPECIFIED BACK PAIN LATERALITY: ICD-10-CM

## 2021-05-30 ENCOUNTER — RECORDS - HEALTHEAST (OUTPATIENT)
Dept: ADMINISTRATIVE | Facility: CLINIC | Age: 66
End: 2021-05-30

## 2021-05-31 VITALS — WEIGHT: 208 LBS | HEIGHT: 63 IN | BODY MASS INDEX: 36.86 KG/M2

## 2021-06-01 ENCOUNTER — RECORDS - HEALTHEAST (OUTPATIENT)
Dept: ADMINISTRATIVE | Facility: CLINIC | Age: 66
End: 2021-06-01

## 2021-06-02 VITALS — WEIGHT: 204 LBS | HEIGHT: 63 IN | BODY MASS INDEX: 36.14 KG/M2

## 2021-06-02 RX ORDER — CYCLOBENZAPRINE HCL 10 MG
10 TABLET ORAL DAILY PRN
Qty: 90 TABLET | Refills: 0 | Status: SHIPPED | OUTPATIENT
Start: 2021-06-02 | End: 2021-07-15

## 2021-06-02 RX ORDER — IRBESARTAN 150 MG/1
150 TABLET ORAL AT BEDTIME
Qty: 90 TABLET | Refills: 0 | Status: SHIPPED | OUTPATIENT
Start: 2021-06-02 | End: 2021-07-15

## 2021-06-02 NOTE — TELEPHONE ENCOUNTER
Last Clinic Visit:   7/29/2020  [facility]  IRBESARTAN 150 MG TABLET

## 2021-06-02 NOTE — TELEPHONE ENCOUNTER
CYCLOBENZAPRINE 10 MG TABLET      Last Written Prescription Date:  4-9-20  Last Fill Quantity: 90,   # refills: 3  Last Office Visit : 7-29-21  Future Office visit:  none    Routing refill request to provider for review/approval because:  Drug not on the [facility], [facility] or [facility] refill protocol or controlled substance

## 2021-06-10 DIAGNOSIS — I10 ESSENTIAL HYPERTENSION, BENIGN: ICD-10-CM

## 2021-06-10 DIAGNOSIS — M54.50 CHRONIC LOW BACK PAIN WITHOUT SCIATICA, UNSPECIFIED BACK PAIN LATERALITY: ICD-10-CM

## 2021-06-10 DIAGNOSIS — G89.29 CHRONIC LOW BACK PAIN WITHOUT SCIATICA, UNSPECIFIED BACK PAIN LATERALITY: ICD-10-CM

## 2021-06-14 RX ORDER — CYCLOBENZAPRINE HCL 10 MG
TABLET ORAL
Qty: 90 TABLET | Refills: 0 | OUTPATIENT
Start: 2021-06-14

## 2021-06-14 RX ORDER — IRBESARTAN 150 MG/1
TABLET ORAL
Qty: 90 TABLET | Refills: 0 | OUTPATIENT
Start: 2021-06-14

## 2021-06-14 NOTE — PROGRESS NOTES
Assessment/Plan:      Diagnoses and all orders for this visit:    Lumbar spine pain  -     MR Lumbar Spine Without Contrast; Future; Expected date: 11/28/17    Lumbar spondylosis  -     MR Lumbar Spine Without Contrast; Future; Expected date: 11/28/17    Arthropathy of lumbar facet joint  -     MR Lumbar Spine Without Contrast; Future; Expected date: 11/28/17    Degenerative lumbar disc  -     MR Lumbar Spine Without Contrast; Future; Expected date: 11/28/17    Ankylosis of lumbar spine  -     Erythrocyte Sedimentation Rate; Future  -     C-Reactive Protein; Future  -     HLA-B27 Antigen; Future  -     MR Lumbar Spine Without Contrast; Future; Expected date: 11/28/17    Scoliosis  -     XR Scoliosis AP and Lateral Standing; Future; Expected date: 11/28/17        Assessment: Pleasant 62-year-old female with a history of breast cancer, thyroid cancer, cervical cancer, anxiety, depression, hypertension, hyperlipidemia with: Chronic lumbar spine pain with    1.  Significant flare of symptoms after a fall 5 days ago.  Symptoms are most consistent with exacerbation of underlying degenerative changes.  She has severe facet arthropathy degenerative disc disease throughout the lower lumbar spine with ankylosis of the L2-3 vertebral body vacuum disc phenomenon at L3-4, L4-5 and L5-S1.  These degenerative changes have rapidly progressed since 2009 when comparing new CT scan versus MRI from 2009.  She is status post lumbar laminectomy in approximately 2006.  2.  She appears to have a mild lumbar curve.  3.  Myofascial pain in the lumbar spine.   4.  Bilateral lower extremity paresthesias in the proximal thighs front and back.  This is with walking and may be more a function of myofascial pain and potential sagittal plane imbalance given her scoliosis as opposed to any stenosis type pain although she does have some foraminal stenosis in the lower lumbar spine.    Discussion:    1.   I discussed the diagnosis and treatment  options.  We discussed the options ofImaging/diagnostics, physical therapy, interventions.  She has been through extensive physical therapy in the past and lumbar laminectomy.  She is interested in conservative options and would like to avoid surgery if possible but also wants to be fairly aggressive with conservative treatment.  2.  Labs as above to evaluate for any underlying cause of the rapid progression of degenerative changes and ankylosis.  3.  Scoliosis imaging to evaluate sagittal plane imbalance.  4.  MRI of the lumbar spine to fully evaluate for spinal stenosis as well as any inflammation as part of her facet arthropathy that would further localize her pain.  5.  Consideration for medial branch blocks once further diagnostics have been obtained she is agreeable to this.  She has had extensive therapy in the past and can consider some further physical therapy as well.  6.  We will contact by phone with results of imaging and further recommendations once imaging has been obtained.    It was our pleasure caring for your patient today, if there any questions or concerns please do not hesitate to contact us.      Subjective:   Patient ID: Dara Lamb is a 62 y.o. female.    History of Present Illness: Patient presents at the request of the emergency department for evaluation of low back pain.  She has chronic lumbar spine pain at the lumbosacral junction radiating down to the sacrum and up into the thoracolumbar junction for several years.  She has a history of laminectomy in approximately 2006 done at [facility].  Has had chronic pain since but 5 days ago, on Thanksgiving she slipped on some papers and fell flat on her back.  She had severe increased pain over the next 2 days and presented to the emergency department.  Was given Medrol Dosepak had a CT scan done and was referred here.    She has low back pain in the lumbar spine into the gluteal region.  This is improved with the Medrol pack but  still fairly constant.  Worse with any sitting at this point.  Standing walking also hurt but are somewhat better than sitting.  No new weakness in the legs but she has had progressive weakness feeling of the legs for several years she is having worsening numbness and tingling over the front and back of the thighs but not past the knees.  This is with any walking and just standing still is helpful.    In the past she has had aqua therapy and [place] along with land physical therapy.  Reports injections at least 10 years ago likely at [facility] in the lumbar spine but nothing since.  No recent physical therapy with this new episode.  Did have CT scan.  Has had MRIs in the past around 2009.      Imaging: CT report and images were personally reviewed and discussed with the patient.  A plastic model was utilized during the discussion.  CT of the lumbar spine personally reviewed from November 25, 2017.  No acute fracture seen.  L2 and L3 vertebral bodies become ankylosed when compared with MRI from 2011.  Fairly advanced degenerative disc and facet arthropathy at the remaining lumbar levels.  Vacuum disc phenomenon in the lower 3 lumbar discs.  Previous lumbar laminectomy changes seen with no high-grade central stenosis.  Moderate right mild left foraminal stenosis L3-4, moderate left and mild right foraminal stenosis L4-5, moderate right and moderate to severe left foraminal stenosis L5-S1    Review of Systems: Complains of vision changes.  No weight loss but she has had some weight gain.  No bowel or bladder incontinence.  No rashes.  She has pain with intimacy, back pain and leg pain. Remainder of 12 point review systems negative unless listed above.    Past Medical History:   Diagnosis Date     Anxiety      Breast cancer      Cancer      Cervical cancer      Depression      Hyperlipidemia      Hypertension      Thyroid cancer        The following portions of the patient's history were reviewed and updated  as appropriate: allergies, current medications, past family history, past medical history, past social history, past surgical history and problem list.      WHO 5: 15    ROBBI Score: 34      Objective:   Physical Exam:    Vitals:    11/28/17 1126   BP: 140/88   Pulse: 94       General:  Well-appearing female in no acute distress.  Obese , pleasant, cooperative, and interactive throughout the examination and interview.  CV: No lower extremity edema on inspection or paltation.  Lymphatics: No cervical lymphadenopathy palpated. Eyes: sclera clear. Skin: No rashes or lesions seen   Respirations unlabored.  MSK: Gait is mildly cautious, wide based but nonantalgic.  Able to heel-toe stand without difficulty.  Negative Romberg.  Spine: normal AP curves of the C, T, and L spine.  Mildly limited lumbar flexion full lumbar extension.  No significant pain on lumbar extension.  Palpation: Tenderness to palpation over lumbar paraspinals L3-4,4-5,5-1 SI regions.  Extremities: Full range of motion of the elbows, and wrists with no effusions or tenderness to palpation.   Full range of motion of the hips, knees, and ankles with no effusions  .   No hypermobility of the upper or lower extremities.  Neurologic exam: Mental status: Patient is alert and oriented with normal affect.  Attention, knowledge, memory, and language are intact.  Normal coordination throughout the examination.  Reflexes are 1+ and symmetric biceps, triceps, brachioradialis, 0patellar, and0 achilles with equivocal toes and Negative Tran's.  Sensation is intact to light touch throughout the upper and lower extremities bilaterally.  Manual muscle testing reveals 5 out of 5 in the hip flexors, knee flexors/extensors, ankle plantar flexors, ankle  dorsiflexors, and EHL.  Upper extremities: Grossly normal strength . Normal muscle bulk and tone in the arms and legs.    Negative seated  straight leg raise bilaterally.

## 2021-06-24 NOTE — PROGRESS NOTES
Assessment/Plan:      Diagnoses and all orders for this visit:    Lumbar spine pain  -     Ambulatory referral to Physical Therapy    Sacroiliac joint pain  -     Ambulatory referral to Physical Therapy  -     OPS Joint Injection Sacroiliac Joint Bilateral    Myofascial pain  -     Ambulatory referral to Physical Therapy    Spinal stenosis of lumbar region, unspecified whether neurogenic claudication present  -     Ambulatory referral to Physical Therapy    Lumbar spondylosis  -     Ambulatory referral to Physical Therapy        Assessment: Pleasant 63 y.o. female with a history of anxiety, depression, hypertension, hyperlipidemia, thyroid cancer, breast cancer with:    1.  chronic lumbar spine pain at the lumbosacral junction gluteal region with some numbness in the back of the legs to the knees.  Likely related to SIJoint pain as she has 3+ provocative maneuvers today including thigh thrust, Greg's test for SI pain, and Sophia finger.    2.  She does have lumbar degenerative disc disease and spondylosis with ankylosis L2-3, mild spinal stenosis at L3-4 with multilevel moderate foraminal stenosis.  Her symptoms are not significantly consistent with a neurogenic claudication presentation however.  Failed medial branch blocks and facet injections.    3.  Myofascial pain lumbar spine and gluteal region.    Discussion:    1.  I discussed the diagnosis and treatment options.  I discussed the MRI which was done approximately 15 months ago.  We discussed her options of physical therapy, injections and medications.  Not sure what medications he is given her medication regimen at this time.  She is not wanting muscle relaxants such as tizanidine or baclofen.    2.  Start physical therapy as planned for her urinary incontinence issues but would also recommend adding pelvic floor and SI stabilization in a new referral was placed.    3.  recommend bilateral sacroiliac joint injections: She has had ongoing pain for several  years and reports having physical therapy done in the past nothing is really helped with her pain including medial branch blocks and facet injections.     4.  Follow-up with me 2-4 weeks after injection.      It was our pleasure caring for your patient today, if there any questions or concerns please do not hesitate to contact us.    25 minutes were spent with this patient in addition to any procedure with greater than 50% in counseling and coordination of care.      Subjective:   Patient ID: Dara Lamb is a 63 y.o. female.    History of Present Illness: Patient presents for ongoing evaluation of low back pain.  Was last seen over one year ago.  At that time had increased pain after a fall.  With time she has not had any new trauma but her symptoms continue to worsen.  After her last visit she underwent bilateral L4-5 and L5-S1 facet injections.  She stated that was not helpful at all.  She went to [facility] and was walking quite a bit and there was no benefit from the injections at all.  Over time her symptoms worsen.  Pain is at the lumbosacral junction but really points to the bilateral PSIS and SI joints.  Pain is a 7/10 today 9/10 at worst.  Worse with any standing walking better with resting such as lying down.  She gets some numbness down the back of the legs to the knees and lateral legs but no focal weakness.  She has had some bladder issues but seeing [facility] and recently started on medications and plans to therapy.  Taking ibuprofen regularly.      Imaging: MRI report and images were personally reviewed and discussed with the patient.  A plastic model was utilized during the discussion.  MRI of the lumbar spine personally reviewed from November 2017.  This reveals multilevel degenerative disc disease and facet arthropathy.  Previous laminectomy.  At L2-3 there is ankylosis of the disc space significant disc at other levels.  At L3-4 there is mild central stenosis.  Moderate foraminal  stenosis at every level and lower lumbar spine and significant facet arthropathy.  Spondylolisthesis L3-4 L4-5 minimal.    Review of Systems: Complains of numbness tingling weakness in the legs in general.  Some urinary which is stabilized.  No headache, dizziness, nausea, vomiting, blurred vision or balance changes.    Past Medical History:   Diagnosis Date     Anxiety      Breast cancer (H)      Cancer (H)      Cervical cancer (H)      Depression      Hyperlipidemia      Hypertension      Thyroid cancer (H)        The following portions of the patient's history were reviewed and updated as appropriate: allergies, current medications, past family history, past medical history, past social history, past surgical history and problem list.      Objective:   Physical Exam:    Vitals:    02/22/19 1301   BP: 135/80   Pulse: 99       General: Alert and oriented with normal affect. Attention, knowledge, memory, and language are intact. No acute distress.   Eyes: Sclerae are clear.  Respirations: Unlabored. CV: No lower extremity edema.   Gait:  Nonantalgic, mildly flexed at the waist.  Tenderness over bilateral SI joints gluteal tissues PSIS.  Positive Sophia finger bilaterally.  SI pain with Greg's position and positive thigh thrust bilaterally.  Sensation is intact to light touch throughout the  lower extremities.  Reflexes are 2+ and symmetric in the biceps triceps and brachioradialis with negative Hoffmans. 2+ patellar and 0 Achilles     Manual muscle testing reveals:  Right /Left out of 5     5/5 hip flexors  5/5 knee flexors  5/5 knee extensors  5/5 ankle plantar flexors  5/5 ankle dorsiflexors  5/5  EHL

## 2021-06-24 NOTE — PATIENT INSTRUCTIONS - HE
DISCHARGE INSTRUCTIONS    During office hours (8:00 a.m.- 4:30 p.m.) questions or concerns may be answered  by calling Spine Navigation Nurses at  [phone].     If you experience any problems after hours  please call [phone] and you will be connected to [facility].     All Patients:    ? You may experience an increase in your symptoms for the first 2 days (It may take anywhere between 2 days- 2 weeks for the steroid to have maximum effect).    ? You may use ice on the injection site, as frequently as 20 minutes each hour if needed.    ? You may take your pain medicine.    ? You may continue taking your regular medication after your injection. If you have had a Medial Branch Block you may resume pain medication once your pain diary is completed.    ? You may shower. No swimming, tub bath or hot tub for 48 hours.  You may remove your bandaid/bandage as soon as you are home.    ? You may resume light activities, as tolerated.    ? Resume your usual diet as tolerated.    ? It is strongly advised that you do not drive for 1-3 hours post injection.    ? If you have had oral sedation:  Do not drive for 8 hours post injection.      ? If you have had IV sedation:  Do not drive for 24 hours post injection.  Do not operate hazardous machinery or make important personal/business decisions for 24 hours.      POSSIBLE STEROID SIDE EFFECTS (If steroid/cortisone was used for your procedure)    -If you experience these symptoms, it should only last for a short period      Swelling of the legs                Skin redness (flushing)       Mouth (oral) irritation     Blood sugar (glucose) levels              Sweats                      Mood changes    Headache    Sleeplessness         POSSIBLE PROCEDURE SIDE EFFECTS  -Call the Spine Center if you are concerned    Increased Pain             Increased numbness/tingling        Nausea/Vomiting            Bruising/bleeding at site        Redness or swelling                                                 Difficulty walking        Weakness             Fever greater than 100.5    *In the event of a severe headache after an epidural steroid injection that is relieved by lying down, please call the [facility] to speak with a clinical staff member*

## 2021-07-03 NOTE — ADDENDUM NOTE
Addendum Note by Adri Shah RN at 12/15/2017 11:59 PM     Author: Adri Shah RN Service: -- Author Type: Registered Nurse    Filed: 12/18/2017  8:46 AM Date of Service: 12/15/2017 11:59 PM Status: Signed    : Adri Shah RN (Registered Nurse)    Encounter addended by: Adri Shah RN on: 12/18/2017  8:46 AM<BR>     Actions taken: Plan of Care note modified

## 2021-07-12 DIAGNOSIS — M54.50 CHRONIC LOW BACK PAIN WITHOUT SCIATICA, UNSPECIFIED BACK PAIN LATERALITY: ICD-10-CM

## 2021-07-12 DIAGNOSIS — G89.29 CHRONIC LOW BACK PAIN WITHOUT SCIATICA, UNSPECIFIED BACK PAIN LATERALITY: ICD-10-CM

## 2021-07-12 DIAGNOSIS — I10 ESSENTIAL HYPERTENSION, BENIGN: ICD-10-CM

## 2021-07-13 NOTE — PROGRESS NOTES
[facility]  Internal Medicine    CC:  Follow up chronic health conditions    Other health care team members:   [facility] Dr. Edgar Moore  [facility] Urology Dr. Bridget Guerrero-[facility] Dr. Steve Anand, Dr. Maico Chaudhary  Endocrinology Dr. Enoc Farnsworth  Psychiatry:  Dr. Alberto Wade  Ob/gyn Dr. Javad Gonzalez    Primary Care Provider: Rosenda Easley MD    HPI:  Dara Lamb is a/an 66 year old female with a past medical history as noted below, who presents today with above chief complaint.    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  Doing noom diet, tracking calories and exercise along with psychology/habits. Has lost nine pounds (now 216).  Her weight is aggravating her chronic LBP with radiculopathy.  Is agreeable to seeing a spine ortho people.  No weakness, but does get numbness along her lateral thighs, and pain posterior thighs.  Chronic LUTS.   Has remote hx cauda equina syndrome s/p laminectomy.  Uses Occasional ibuprofen, for LBP, but avoids it due to hx diverticular bleed.  Exercice is limited due to back pain.    Reviewed Health care maintenance:  Colonoscopy 3/3/20 diverticulosis, advised f/u 2 years due to poor prep  Mammogram 1/3/20, followed by specialist  Pap per ob/gyn  DXA 7/25/14 T -1.4 LFN  Immunizations up to date  Labs due  Reviewed and reconciled all medications  Med refills due  BMI 38  Wt management  BP's controlled  Non smoker  S/P total thyroidectomy for thyroid cancer 2012  S/P right lumpectomy for breast cancer 2011      Patient Active Problem List   Diagnosis     Abnormal Pap smear and cervical HPV (human papillomavirus)     Breast neoplasm     Cerebral aneurysm, nonruptured     Migraine without aura     Malignant neoplasm of connective and other soft tissue     Diverticulitis of colon     Benign essential hypertension     Lumbago     Spinal stenosis, lumbar region, without neurogenic claudication      Asymptomatic menopausal state     Obstructive sleep apnea     Restless legs syndrome (RLS)     Primary thyroid papillary carcinoma (H)     Bipolar affective disorder (H)     Glaucoma     Class 2 obesity due to excess calories with body mass index (BMI) of 36.0 to 36.9 in adult, unspecified whether serious comorbidity present     Gastroesophageal reflux disease without esophagitis     Low back pain, unspecified back pain laterality, unspecified chronicity, with sciatica presence unspecified     Breast cancer in female (H)     Urge incontinence     Pelvic somatic dysfunction     Urinary urgency     Obesity with body mass index (BMI) of 30.0 to 39.9     Elevated LFTs     Anemia, unspecified type     History of thyroidectomy     Atrophic vaginitis     Post-surgical hypothyroidism     Encounter for screening mammogram for breast cancer     Personal history of malignant neoplasm of breast     Personal history of malignant neoplasm of thyroid     Prediabetes     Mixed hyperlipidemia       Past Surgical History:   Procedure Laterality Date      SECTION      , ,      COLECTOMY LEFT      sigmoid colon , diverticulitis, colostomy  reanastomosis     COLONOSCOPY      2009     LAMINECTOMY LUMBAR ONE LEVEL      for cauda equina syndrome, 2006     LAPAROTOMY EXPLORATORY       for ?bowel obstx r/o pelvic infection     LUMPECTOMY BREAST      right breast 5/3/11     MAMMOPLASTY REDUCTION BILATERAL      11     THYROIDECTOMY       TONSILLECTOMY      10/26/03     WRIST SURGERY       Current Outpatient Medications   Medication Sig Dispense Refill     atomoxetine (STRATTERA) 100 MG capsule Take 100 mg by mouth every morning       atomoxetine (STRATTERA) 60 MG capsule Take 60 mg by mouth At Bedtime       calcium carbonate (OS-RUBEN 500 MG [place]. [place]) 500 MG tablet Take 2 tablets by mouth daily. (Patient taking differently: Take 1,000 mg by mouth 2 times daily ) 60 tablet 0     cyclobenzaprine (FLEXERIL) 10  MG tablet Take 1 tablet (10 mg) by mouth daily as needed for muscle spasms 90 tablet 3     diltiazem ER COATED BEADS (CARDIZEM CD/CARTIA XT) 240 MG 24 hr capsule Take 1 capsule (240 mg) by mouth daily 90 capsule 3     estradiol (ESTRACE) 0.1 MG/GM vaginal cream Place 2 g vaginally twice a week 42.5 g 3     eszopiclone (LUNESTA) 3 MG tablet TAKE 1 TABLET BY MOUTH AT BEDTIME AS NEEDED FOR SLEEP  2     fish oil-omega-3 fatty acids 1000 MG capsule Take 1 capsule by mouth       gabapentin (NEURONTIN) 600 MG tablet Take 300 mg by mouth At Bedtime        ibuprofen (ADVIL/MOTRIN) 200 MG tablet Take 4 tablets (800 mg) by mouth daily as needed for moderate pain Do not take if you have stomach upset or gastrointestinal bleeding. 180 tablet 3     irbesartan (AVAPRO) 150 MG tablet Take 1 tablet (150 mg) by mouth At Bedtime 90 tablet 3     latanoprost (XALATAN) 0.005 % ophthalmic solution Place 1 drop into both eyes At Bedtime       lurasidone (LATUDA) 60 MG TABS tablet Take by mouth At Bedtime       multivitamin, therapeutic with minerals (THERA-VIT-M) TABS Take 1 tablet by mouth daily 90 each 3     pantoprazole (PROTONIX) 40 MG EC tablet Take 1 tablet (40 mg) by mouth daily 90 tablet 3     polyethylene glycol (MIRALAX) powder Take 17 g by mouth daily 119 g 2     QUEtiapine (SEROQUEL XR) 50 MG TB24 Take 50 mg by mouth At Bedtime Take 1.5 tablets daily.       rosuvastatin (CRESTOR) 40 MG tablet Take 1 tablet (40 mg) by mouth daily 90 tablet 3     solifenacin (VESICARE) 5 MG tablet Take 1 tablet (5 mg) by mouth daily * SCHEDULE CLINIC APPT.* 30 tablet 0     SYNTHROID 150 MCG tablet Take 150 mcg by mouth daily       vitamin  B complex with vitamin C (STRESS TAB) TABS Take 1 tablet by mouth daily 90 tablet 1     vortioxetine (TRINTELLIX) 20 MG tablet 20 mg         Allergies   Allergen Reactions     Contrast Dye Shortness Of Breath     Dust Mite Extract Other (See Comments)     Sneezing     Dust Mites Other (See Comments)      "Sneezing around dogs and cats     Hydrochlorothiazide      Latex      Lipitor [Atorvastatin Calcium]      myalgias     Oxycodone-Acetaminophen Nausea and Vomiting     Sulfa Drugs      arthralgias     Vicodin [Hydrocodone-Acetaminophen]      Nausea, vomiting       PHYSICAL EXAM:  BP Readings from Last 6 Encounters:   07/29/20 111/79   02/20/20 116/78   02/22/19 113/78   01/17/19 120/50   04/26/18 124/79   01/25/18 123/86     Wt Readings from Last 5 Encounters:   07/29/20 99.8 kg (220 lb)   02/20/20 98 kg (216 lb)   02/22/19 99.1 kg (218 lb 8 oz)   01/17/19 97.5 kg (215 lb)   04/26/18 95.6 kg (210 lb 12.8 oz)     Estimated body mass index is 38.36 kg/m  as calculated from the following:    Height as of 7/29/20: 1.613 m (5' 3.5\").    Weight as of 7/29/20: 99.8 kg (220 lb).  General:  Alert, appears generally healthy, not pale, range of affect within normal limits, breathing comfortably, actively engaged in conversation. No cough, wheeze, shortness of breath. EOM's intact, no facial assymetry, no dysarthria.    THE FOLLOWING PERTINENT TEST RESULTS WERE REVIEWED TODAY:    Component      Latest Ref Rng & Units 7/29/2020 11/29/2020   WBC      4.0 - 11.0 10e9/L 3.8 (L)    RBC Count      3.8 - 5.2 10e12/L 4.55    Hemoglobin      11.7 - 15.7 g/dL 13.8    Hematocrit      35.0 - 47.0 % 42.3    MCV      78 - 100 fl 93    MCH      26.5 - 33.0 pg 30.3    MCHC      31.5 - 36.5 g/dL 32.6    RDW      10.0 - 15.0 % 14.6    Platelet Count      150 - 450 10e9/L 201    Diff Method       Automated Method    % Neutrophils      % 55.1    % Lymphocytes      % 27.1    % Monocytes      % 11.7    % Eosinophils      % 4.0    % Basophils      % 0.8    % Immature Granulocytes      % 1.3    Nucleated RBCs      0 /100 0    Absolute Neutrophil      1.6 - 8.3 10e9/L 2.1    Absolute Lymphocytes      0.8 - 5.3 10e9/L 1.0    Absolute Monocytes      0.0 - 1.3 10e9/L 0.4    Absolute Eosinophils      0.0 - 0.7 10e9/L 0.2    Absolute Basophils      0.0 - 0.2 " 10e9/L 0.0    Abs Immature Granulocytes      0 - 0.4 10e9/L 0.1    Absolute Nucleated RBC       0.0    Sodium      133 - 144 mmol/L 139    Potassium      3.4 - 5.3 mmol/L 3.9    Chloride      94 - 109 mmol/L 108    Carbon Dioxide      20 - 32 mmol/L 25    Anion Gap      3 - 14 mmol/L 6    Glucose      70 - 99 mg/dL 107 (H)    Urea Nitrogen      7 - 30 mg/dL 10    Creatinine      0.52 - 1.04 mg/dL 0.64    GFR Estimate      >60 mL/min/1.73:m2 >90    GFR Estimate If Black      >60 mL/min/1.73:m2 >90    Calcium      8.5 - 10.1 mg/dL 8.8    Bilirubin Total      0.2 - 1.3 mg/dL 0.4    Albumin      3.4 - 5.0 g/dL 3.8    Protein Total      6.8 - 8.8 g/dL 8.1    Alkaline Phosphatase      40 - 150 U/L 113    ALT      0 - 50 U/L 28    AST      0 - 45 U/L 26    Cholesterol      <200 mg/dL 186    Triglycerides      <150 mg/dL 49    HDL Cholesterol      >49 mg/dL 111    LDL Cholesterol Calculated      <100 mg/dL 65    Non HDL Cholesterol      <130 mg/dL 75    Iron      35 - 180 ug/dL 47    Iron Binding Cap      240 - 430 ug/dL 343    Iron Saturation Index      15 - 46 % 14 (L)    COVID-19 Virus PCR to [facility] - Source        Nasopharyngeal   COVID-19 Virus PCR to [facility] - Result        Not Detected   Ferritin      8 - 252 ng/mL 49    Hemoglobin A1C      0 - 5.6 % 6.0 (H)    Bilirubin Direct      0.0 - 0.2 mg/dL 0.1    TSH      0.40 - 4.00 mU/L 0.28 (L)    T4 Free      0.76 - 1.46 ng/dL 0.98        Dara was seen today for recheck medication.    Diagnoses and all orders for this visit:    Essential hypertension, benign  -     Refill diltiazem ER COATED BEADS (CARDIZEM CD/CARTIA XT) 240 MG 24 hr capsule; Take 1 capsule (240 mg) by mouth daily  -     Refill irbesartan (AVAPRO) 150 MG tablet; Take 1 tablet (150 mg) by mouth At Bedtime  -     Comprehensive metabolic panel; Future  -     CBC with platelets differential; Future    Gastroesophageal reflux disease without esophagitis  -     Refill pantoprazole (PROTONIX) 40 MG EC  tablet; Take 1 tablet (40 mg) by mouth daily    Mixed hyperlipidemia  -     Refill rosuvastatin (CRESTOR) 40 MG tablet; Take 1 tablet (40 mg) by mouth daily  -     Lipid panel reflex to direct LDL Fasting; Future    Chronic low back pain without sciatica, unspecified back pain laterality  -     Refill cyclobenzaprine (FLEXERIL) 10 MG tablet; Take 1 tablet (10 mg) by mouth daily as needed for muscle spasms    Atrophic vaginitis  -     estradiol (ESTRACE) 0.1 MG/GM vaginal cream; Place 2 g vaginally twice a week    Post-surgical hypothyroidism  -     TSH; Future    Encounter for screening mammogram for breast cancer, Personal history of malignant neoplasm of breast  -     Mammogram, screening w magdalena (3D); Future, at [facility]    Personal history of malignant neoplasm of thyroid  -     TSH; Future    Prediabetes  -     Hemoglobin A1c; Future    Chronic cough  -     XR Chest 2 Views; Future    Lower urinary tract symptoms (LUTS)  -     Adult Urology Referral; Future    Lumbar back pain with radiculopathy affecting lower extremity  -     Spine Referral; Future      In clinic appt to review CXR and chronic cough  Schedule appt for routine physical next available as well.    Rosenda Easley M.D.  Internal Medicine  [facility]     Patients: if you have questions or concerns about this progress note, please discuss them with the provider at a future office visit.

## 2021-07-14 PROBLEM — E89.0 HISTORY OF THYROIDECTOMY: Status: ACTIVE | Noted: 2021-07-14

## 2021-07-14 PROBLEM — Z85.850 PERSONAL HISTORY OF MALIGNANT NEOPLASM OF THYROID: Status: ACTIVE | Noted: 2021-07-14

## 2021-07-14 PROBLEM — R73.03 PREDIABETES: Status: ACTIVE | Noted: 2021-07-14

## 2021-07-14 PROBLEM — Z90.89 HISTORY OF THYROIDECTOMY: Status: ACTIVE | Noted: 2021-07-14

## 2021-07-14 PROBLEM — Z98.890 HISTORY OF THYROIDECTOMY: Status: ACTIVE | Noted: 2021-07-14

## 2021-07-14 PROBLEM — N95.2 ATROPHIC VAGINITIS: Status: ACTIVE | Noted: 2021-07-14

## 2021-07-14 PROBLEM — Z85.3 PERSONAL HISTORY OF MALIGNANT NEOPLASM OF BREAST: Status: ACTIVE | Noted: 2021-07-14

## 2021-07-14 PROBLEM — E78.2 MIXED HYPERLIPIDEMIA: Status: ACTIVE | Noted: 2021-07-14

## 2021-07-14 PROBLEM — E89.0 POST-SURGICAL HYPOTHYROIDISM: Status: ACTIVE | Noted: 2021-07-14

## 2021-07-14 PROBLEM — Z12.31 ENCOUNTER FOR SCREENING MAMMOGRAM FOR BREAST CANCER: Status: ACTIVE | Noted: 2021-07-14

## 2021-07-14 NOTE — PATIENT INSTRUCTIONS
Patient Education     Weight Management: Exercise and Activity    Studies show that people who exercise are the most likely to lose weight and keep it off. Exercise burns calories. It helps build muscle to make your body stronger. Make exercise an important part of your weight-management plan.   Make activity part of your day  You may not think you have the time to exercise. But you can work activity into your daily life--you just need to be committed. Take 10 minutes out of your lunch hour to take a walk. Walk to the [employer] to get your paper instead of having it delivered. Make it a habit to take the stairs instead of the elevator. Park in a far away parking spot instead of the closest. You ll be surprised at how fast these little changes can make a difference.   Some people really cannot walk very far, and tire out quickly with exercise. Instead of becoming discouraged, resolve to do what you can do, and work to make that a regular frequent habit.    The benefits of exercise  Exercise offers many benefits including:     Exercise increases how fast your body burns calories (your metabolism).    Regular exercise can increase the amount of muscle in your body. Muscle burns calories faster than fat. The more muscle you have, the more calories you burn.    Exercise gives you energy and curbs your appetite.    Exercise decreases stress and helps you sleep better. Find out for yourself what time of day works best for you.  Make exercise fun  Exercise can be fun. Choose an activity you enjoy. You may even get a friend to do it with you:     Take a resistance-training or aerobics class.    Join a team sport.    Take a dance class.    Walk the dog.    Ride a bike.  If you have health problems, always ask your healthcare provider before you start an exercise program. Have a  help you develop a plan that s safe for you.   Sheila last reviewed this educational content on 11/1/2020 2000-2021 [employer]  [employer]. All rights reserved. This information is not intended as a substitute for professional medical care. Always follow your healthcare professional's instructions.

## 2021-07-15 ENCOUNTER — VIRTUAL VISIT (OUTPATIENT)
Dept: INTERNAL MEDICINE | Facility: CLINIC | Age: 66
End: 2021-07-15
Payer: COMMERCIAL

## 2021-07-15 DIAGNOSIS — R73.03 PREDIABETES: ICD-10-CM

## 2021-07-15 DIAGNOSIS — N95.2 ATROPHIC VAGINITIS: ICD-10-CM

## 2021-07-15 DIAGNOSIS — R39.9 LOWER URINARY TRACT SYMPTOMS (LUTS): ICD-10-CM

## 2021-07-15 DIAGNOSIS — I10 ESSENTIAL HYPERTENSION, BENIGN: Primary | ICD-10-CM

## 2021-07-15 DIAGNOSIS — Z85.3 PERSONAL HISTORY OF MALIGNANT NEOPLASM OF BREAST: ICD-10-CM

## 2021-07-15 DIAGNOSIS — G89.29 CHRONIC LOW BACK PAIN WITHOUT SCIATICA, UNSPECIFIED BACK PAIN LATERALITY: ICD-10-CM

## 2021-07-15 DIAGNOSIS — R05.3 CHRONIC COUGH: ICD-10-CM

## 2021-07-15 DIAGNOSIS — M54.50 CHRONIC LOW BACK PAIN WITHOUT SCIATICA, UNSPECIFIED BACK PAIN LATERALITY: ICD-10-CM

## 2021-07-15 DIAGNOSIS — K21.9 GASTROESOPHAGEAL REFLUX DISEASE WITHOUT ESOPHAGITIS: ICD-10-CM

## 2021-07-15 DIAGNOSIS — E78.2 MIXED HYPERLIPIDEMIA: ICD-10-CM

## 2021-07-15 DIAGNOSIS — M54.16 LUMBAR BACK PAIN WITH RADICULOPATHY AFFECTING LOWER EXTREMITY: ICD-10-CM

## 2021-07-15 DIAGNOSIS — E89.0 POST-SURGICAL HYPOTHYROIDISM: ICD-10-CM

## 2021-07-15 DIAGNOSIS — Z12.31 ENCOUNTER FOR SCREENING MAMMOGRAM FOR BREAST CANCER: ICD-10-CM

## 2021-07-15 DIAGNOSIS — Z85.850 PERSONAL HISTORY OF MALIGNANT NEOPLASM OF THYROID: ICD-10-CM

## 2021-07-15 PROCEDURE — 99214 OFFICE O/P EST MOD 30 MIN: CPT | Mod: GT | Performed by: INTERNAL MEDICINE

## 2021-07-15 RX ORDER — ESTRADIOL 0.1 MG/G
2 CREAM VAGINAL
Qty: 42.5 G | Refills: 3 | Status: ON HOLD | OUTPATIENT
Start: 2021-07-15 | End: 2024-01-03

## 2021-07-15 RX ORDER — ATOMOXETINE 60 MG/1
60 CAPSULE ORAL AT BEDTIME
COMMUNITY

## 2021-07-15 RX ORDER — LEVOTHYROXINE SODIUM 150 MCG
150 TABLET ORAL DAILY
Status: ON HOLD | COMMUNITY
Start: 2021-05-12 | End: 2024-01-03

## 2021-07-15 RX ORDER — PANTOPRAZOLE SODIUM 40 MG/1
40 TABLET, DELAYED RELEASE ORAL DAILY
Qty: 90 TABLET | Refills: 3 | Status: SHIPPED | OUTPATIENT
Start: 2021-07-15 | End: 2021-09-30

## 2021-07-15 RX ORDER — LEVOTHYROXINE SODIUM 137 UG/1
137 TABLET ORAL DAILY
Qty: 90 TABLET | Refills: 3 | Status: SHIPPED | OUTPATIENT
Start: 2021-07-15 | End: 2021-07-15

## 2021-07-15 RX ORDER — ROSUVASTATIN CALCIUM 40 MG/1
40 TABLET, COATED ORAL DAILY
Qty: 90 TABLET | Refills: 3 | Status: SHIPPED | OUTPATIENT
Start: 2021-07-15 | End: 2021-09-30

## 2021-07-15 RX ORDER — ATOMOXETINE 100 MG/1
100 CAPSULE ORAL EVERY MORNING
COMMUNITY
Start: 2021-06-29

## 2021-07-15 RX ORDER — DILTIAZEM HYDROCHLORIDE 240 MG/1
240 CAPSULE, COATED, EXTENDED RELEASE ORAL DAILY
Qty: 90 CAPSULE | Refills: 3 | Status: SHIPPED | OUTPATIENT
Start: 2021-07-15 | End: 2022-08-08

## 2021-07-15 RX ORDER — CYCLOBENZAPRINE HCL 10 MG
10 TABLET ORAL DAILY PRN
Qty: 90 TABLET | Refills: 3 | Status: SHIPPED | OUTPATIENT
Start: 2021-07-15 | End: 2021-09-30

## 2021-07-15 RX ORDER — IRBESARTAN 150 MG/1
TABLET ORAL
Qty: 90 TABLET | Refills: 0 | OUTPATIENT
Start: 2021-07-15

## 2021-07-15 RX ORDER — CYCLOBENZAPRINE HCL 10 MG
TABLET ORAL
Qty: 90 TABLET | Refills: 0 | OUTPATIENT
Start: 2021-07-15

## 2021-07-15 RX ORDER — IRBESARTAN 150 MG/1
150 TABLET ORAL AT BEDTIME
Qty: 90 TABLET | Refills: 3 | Status: SHIPPED | OUTPATIENT
Start: 2021-07-15 | End: 2021-09-30

## 2021-07-15 NOTE — PROGRESS NOTES
"This patient is being evaluated via a billable video visit as an alternative to an in-person visit.       The patient has been notified of following:     \"This video visit will be conducted via a call between you and your physician/provider. We have found that certain health care needs can be provided without the need for an in-person physical exam.  This service lets us provide the care you need with a video conversation.  If a prescription is necessary we can send it directly to your pharmacy.  If lab work is needed we can place an order for that and you can then stop by our lab to have the test done at a later time. If during the course of the call the physician/provider feels a video visit is not appropriate, you will not be charged for this service.\"     Patient has given verbal consent for virtual video visit? Yes  Did patient initiate this virtual visit? Yes    Person spoken to:  Patient    This was a synchronous virtual visit  Location of patient: home  Location of physician:  home office  Department name:  Medicine  Mode of communication:  Video Conference via [employer]    Time video initiated:  8:05 a.m.  Time video ended:  8:35 a.m.  Total length of video visit: 30 min    Rosenda Easley M.D.  Internal Medicine  [facility]       Patients: if you have questions or concerns about this progress note, please discuss them with the provider at a future office visit.    "

## 2021-07-15 NOTE — NURSING NOTE
Chief Complaint   Patient presents with     Recheck Medication     pt would like to follow up on sha Gil CMA, EMT at 8:04 AM on 7/15/2021.

## 2021-07-15 NOTE — Clinical Note
Please schedule an in person visit with me or one of the Tuesday afternoon IM residents within four weeks to review CXR.  Schedule in person routine physical next available.  Labs and CXR within one week. Mammogram is for [facility]. Urology and Spine referrals are routine, next available.  Thank you.  SB

## 2021-07-16 ENCOUNTER — TELEPHONE (OUTPATIENT)
Dept: INTERNAL MEDICINE | Facility: CLINIC | Age: 66
End: 2021-07-16

## 2021-07-16 NOTE — TELEPHONE ENCOUNTER
Left message for patient to call to schedule in person visit with Dr Easley or one of the Tuesday afternoon IM residents within four weeks to review CXR.  Schedule in person routine physical next available.  Labs and CXR within one week. P[facility] and Imaging phone number given to call back to schedule.

## 2021-09-30 ENCOUNTER — ANCILLARY PROCEDURE (OUTPATIENT)
Dept: GENERAL RADIOLOGY | Facility: CLINIC | Age: 66
End: 2021-09-30
Attending: INTERNAL MEDICINE
Payer: COMMERCIAL

## 2021-09-30 ENCOUNTER — OFFICE VISIT (OUTPATIENT)
Dept: INTERNAL MEDICINE | Facility: CLINIC | Age: 66
End: 2021-09-30
Payer: COMMERCIAL

## 2021-09-30 VITALS
BODY MASS INDEX: 38.06 KG/M2 | SYSTOLIC BLOOD PRESSURE: 119 MMHG | DIASTOLIC BLOOD PRESSURE: 88 MMHG | HEART RATE: 78 BPM | HEIGHT: 63 IN | WEIGHT: 214.8 LBS | OXYGEN SATURATION: 99 %

## 2021-09-30 DIAGNOSIS — E78.2 MIXED HYPERLIPIDEMIA: ICD-10-CM

## 2021-09-30 DIAGNOSIS — I10 ESSENTIAL HYPERTENSION, BENIGN: ICD-10-CM

## 2021-09-30 DIAGNOSIS — R91.8 PULMONARY NODULES: ICD-10-CM

## 2021-09-30 DIAGNOSIS — I10 BENIGN ESSENTIAL HYPERTENSION: ICD-10-CM

## 2021-09-30 DIAGNOSIS — R05.3 PERSISTENT COUGH: Primary | ICD-10-CM

## 2021-09-30 DIAGNOSIS — M54.50 CHRONIC LOW BACK PAIN WITHOUT SCIATICA, UNSPECIFIED BACK PAIN LATERALITY: ICD-10-CM

## 2021-09-30 DIAGNOSIS — G89.29 CHRONIC LOW BACK PAIN WITHOUT SCIATICA, UNSPECIFIED BACK PAIN LATERALITY: ICD-10-CM

## 2021-09-30 DIAGNOSIS — K21.9 GASTROESOPHAGEAL REFLUX DISEASE WITHOUT ESOPHAGITIS: ICD-10-CM

## 2021-09-30 DIAGNOSIS — R05.3 CHRONIC COUGH: ICD-10-CM

## 2021-09-30 PROCEDURE — 71046 X-RAY EXAM CHEST 2 VIEWS: CPT | Mod: GC | Performed by: RADIOLOGY

## 2021-09-30 PROCEDURE — 99214 OFFICE O/P EST MOD 30 MIN: CPT | Mod: GC | Performed by: STUDENT IN AN ORGANIZED HEALTH CARE EDUCATION/TRAINING PROGRAM

## 2021-09-30 RX ORDER — DILTIAZEM HYDROCHLORIDE 240 MG/1
240 CAPSULE, EXTENDED RELEASE ORAL DAILY
Qty: 90 CAPSULE | Refills: 3 | Status: SHIPPED | OUTPATIENT
Start: 2021-09-30 | End: 2023-12-28

## 2021-09-30 RX ORDER — CALCIUM CARBONATE/VITAMIN D3 500MG-5MCG
TABLET ORAL
COMMUNITY
Start: 2021-07-07

## 2021-09-30 RX ORDER — TIMOLOL MALEATE 5 MG/ML
SOLUTION/ DROPS OPHTHALMIC
COMMUNITY
Start: 2021-07-13

## 2021-09-30 RX ORDER — MONTELUKAST SODIUM 10 MG/1
10 TABLET ORAL AT BEDTIME
Qty: 90 TABLET | Refills: 1 | Status: SHIPPED | OUTPATIENT
Start: 2021-09-30 | End: 2022-04-11

## 2021-09-30 RX ORDER — ALBUTEROL SULFATE 90 UG/1
1-2 AEROSOL, METERED RESPIRATORY (INHALATION) EVERY 4 HOURS PRN
Qty: 6.7 G | Refills: 0 | Status: SHIPPED | OUTPATIENT
Start: 2021-09-30 | End: 2021-10-18

## 2021-09-30 RX ORDER — FAMOTIDINE 20 MG/1
20 TABLET, FILM COATED ORAL DAILY
Qty: 90 TABLET | Refills: 0 | Status: CANCELLED | OUTPATIENT
Start: 2021-09-30 | End: 2021-12-29

## 2021-09-30 RX ORDER — CYCLOBENZAPRINE HCL 10 MG
10 TABLET ORAL DAILY PRN
Qty: 90 TABLET | Refills: 3 | Status: SHIPPED | OUTPATIENT
Start: 2021-09-30 | End: 2022-07-12

## 2021-09-30 RX ORDER — IRBESARTAN 150 MG/1
150 TABLET ORAL AT BEDTIME
Qty: 90 TABLET | Refills: 3 | Status: SHIPPED | OUTPATIENT
Start: 2021-09-30 | End: 2022-11-19

## 2021-09-30 RX ORDER — PANTOPRAZOLE SODIUM 40 MG/1
40 TABLET, DELAYED RELEASE ORAL 2 TIMES DAILY
Qty: 90 TABLET | Refills: 3 | Status: SHIPPED | OUTPATIENT
Start: 2021-09-30 | End: 2022-06-02

## 2021-09-30 RX ORDER — ROSUVASTATIN CALCIUM 40 MG/1
40 TABLET, COATED ORAL DAILY
Qty: 90 TABLET | Refills: 3 | Status: SHIPPED | OUTPATIENT
Start: 2021-09-30 | End: 2022-12-30

## 2021-09-30 RX ORDER — DILTIAZEM HYDROCHLORIDE 240 MG/1
240 CAPSULE, EXTENDED RELEASE ORAL
COMMUNITY
End: 2021-09-30

## 2021-09-30 ASSESSMENT — PAIN SCALES - GENERAL: PAINLEVEL: MILD PAIN (2)

## 2021-09-30 ASSESSMENT — MIFFLIN-ST. JEOR: SCORE: 1483.46

## 2021-09-30 NOTE — PROGRESS NOTES
"                     [facility]       SUBJECTIVE:  Dara Lamb is a 66 year old female with a PMHx of cervical cancer, obesity, breast cancer s/p right lumpectomy, thyroid cancer s/p thyroidectomy, HLD, and HTN who comes in for a persistent cough ongoing for the last year.     Recently saw Dr Easley her PCP 2 months ago for this cough. Has been ongoing almost a year at this point. It is productive because she feels she is producing sputum but not bringing it all the way up. She denies any fevers, chills, night sweats, unintentional weight loss. She has never been a smoker. No new medications in the last year that were timed with the start of this cough.   She does have a history of GERD for which she is on pantoprazole but she continues to have GERD symptoms despite compliance with her PPI.      No seasonal allergies just allergic to cats and dogs.    Medications and allergies reviewed by me today.     ROS:   Constitutional, neuro, ENT, endocrine, pulmonary, cardiac, gastrointestinal, genitourinary, musculoskeletal, integument and psychiatric systems are negative, except as otherwise noted.    OBJECTIVE:    /88 (BP Location: Left arm, Patient Position: Sitting, Cuff Size: Adult Large)   Pulse 78   Ht 1.6 m (5' 3\")   Wt 97.4 kg (214 lb 12.8 oz)   SpO2 99%   BMI 38.05 kg/m     Wt Readings from Last 1 Encounters:   09/30/21 97.4 kg (214 lb 12.8 oz)       GENERAL APPEARANCE: healthy, alert and no distress     EYES: EOMI, PERRL     NECK: no adenopathy, no asymmetry, masses     RESP: lungs clear to auscultation - no rales, rhonchi or wheezes     CV: regular rates and rhythm, normal S1 S2, no S3 or S4 and no murmur, click or rub     SKIN: no suspicious lesions or rashes     NEURO:  exam grossly normal, mentation intact and speech normal     PSYCH: mentation appears normal. and affect normal/bright     LYMPHATICS: No cervical adenopathy     ASSESSMENT/PLAN:    Dara was seen today for follow up " of persistent cough and refill medication.    Diagnoses and all orders for this visit:    Persistent cough  Could be poorly controlled GERD given her GERD symptoms still not well controlled with her PPI. Could be post nasal drip though less likely no other allergies. Could have cough variant asthma though also less likely. Will obtain a CT given her cough persistence, significant past cancer history, and also she had a lung nodule noted on CT from 2014 never had follow up CT. CXR done today appears to look ok but there is no formal radiology read yet.  -     CT Chest w/o contrast; Future  -     montelukast (SINGULAIR) 10 MG tablet; Take 1 tablet (10 mg) by mouth At Bedtime  -     albuterol (PROAIR HFA/PROVENTIL HFA/VENTOLIN HFA) 108 (90 Base) MCG/ACT inhaler; Inhale 1-2 puffs into the lungs every 4 hours as needed for shortness of breath / dyspnea or wheezing  -      Will increased pantoprazole to BID dosing         -      Follow up in 2 months to assess if cough improving/these med trials have improved symptoms     Chronic low back pain without sciatica, unspecified back pain laterality  Refill  -     cyclobenzaprine (FLEXERIL) 10 MG tablet; Take 1 tablet (10 mg) by mouth daily as needed for muscle spasms    Essential hypertension, benign  Refill   -     irbesartan (AVAPRO) 150 MG tablet; Take 1 tablet (150 mg) by mouth At Bedtime        -     diltiazem ER (DILT-XR) 240 MG 24 hr ER beaded capsule; Take 1 capsule (240 mg) by mouth daily    Gastroesophageal reflux disease without esophagitis  Refill at increased dosing (BID) to better control symptoms. Will plan to add on famotidine if still poor control.   -     pantoprazole (PROTONIX) 40 MG EC tablet; Take 1 tablet (40 mg) by mouth 2 times daily    Mixed hyperlipidemia  Refill   -     rosuvastatin (CRESTOR) 40 MG tablet; Take 1 tablet (40 mg) by mouth daily           Pt should return to clinic for f/u with me in 2 months.    Mirit Mohsen Yacoup, MD  Sep 30,  2021    Pt was seen and plan of care discussed with Dr Shalonda Miller.

## 2021-09-30 NOTE — NURSING NOTE
Chief Complaint   Patient presents with     Recheck Medication     Pt notes she has a cough       Maurice Martin EMT at 10:26 AM on 9/30/2021.

## 2021-10-14 DIAGNOSIS — R05.3 PERSISTENT COUGH: ICD-10-CM

## 2021-10-18 RX ORDER — ALBUTEROL SULFATE 90 UG/1
1-2 AEROSOL, METERED RESPIRATORY (INHALATION) EVERY 4 HOURS PRN
Qty: 18 G | Refills: 10 | Status: SHIPPED | OUTPATIENT
Start: 2021-10-18 | End: 2023-09-13

## 2021-10-18 NOTE — TELEPHONE ENCOUNTER
Last Clinic Visit: 9/30/2021  [facility] Internal Medicine [place] (staffed by Dr Mcintyre)

## 2021-10-22 ENCOUNTER — ANCILLARY PROCEDURE (OUTPATIENT)
Dept: CT IMAGING | Facility: CLINIC | Age: 66
End: 2021-10-22
Attending: INTERNAL MEDICINE
Payer: COMMERCIAL

## 2021-10-22 DIAGNOSIS — R05.3 PERSISTENT COUGH: ICD-10-CM

## 2021-10-22 PROCEDURE — 71250 CT THORAX DX C-: CPT | Mod: GC | Performed by: RADIOLOGY

## 2021-10-23 ENCOUNTER — HEALTH MAINTENANCE LETTER (OUTPATIENT)
Age: 66
End: 2021-10-23

## 2021-10-25 ENCOUNTER — TELEPHONE (OUTPATIENT)
Dept: INTERNAL MEDICINE | Facility: CLINIC | Age: 66
End: 2021-10-25

## 2021-10-29 ENCOUNTER — TRANSFERRED RECORDS (OUTPATIENT)
Dept: HEALTH INFORMATION MANAGEMENT | Facility: CLINIC | Age: 66
End: 2021-10-29
Payer: COMMERCIAL

## 2021-11-15 NOTE — PROGRESS NOTES
RECORDS STATUS - ALL OTHER DIAGNOSIS      RECORDS RECEIVED FROM: [facility]   DATE RECEIVED: 11/22/2021   NOTES STATUS DETAILS   OFFICE NOTE from referring provider Complete Epic  Referred by Bello Middleton MD    OFFICE NOTE from medical oncologist N/A    DISCHARGE SUMMARY from hospital     DISCHARGE REPORT from the ER     OPERATIVE REPORT N/A    MEDICATION LIST Complete [facility]   CLINICAL TRIAL TREATMENTS TO DATE     LABS     PATHOLOGY REPORTS     ANYTHING RELATED TO DIAGNOSIS Complete Labs last updated on 7/15/2021   GENONOMIC TESTING     TYPE:     IMAGING (NEED IMAGES & REPORT)     CT SCANS complete CT Chest 10/22/2021   MRI     Xray Chest Complete 9/30/2021   MAMMO     ULTRASOUND     PET

## 2021-11-22 ENCOUNTER — PRE VISIT (OUTPATIENT)
Dept: ONCOLOGY | Facility: CLINIC | Age: 66
End: 2021-11-22

## 2021-11-22 ENCOUNTER — VIRTUAL VISIT (OUTPATIENT)
Dept: ONCOLOGY | Facility: CLINIC | Age: 66
End: 2021-11-22
Attending: INTERNAL MEDICINE
Payer: COMMERCIAL

## 2021-11-22 DIAGNOSIS — R91.8 PULMONARY NODULES: ICD-10-CM

## 2021-11-22 PROCEDURE — 99204 OFFICE O/P NEW MOD 45 MIN: CPT | Mod: GT | Performed by: INTERNAL MEDICINE

## 2021-11-22 NOTE — LETTER
11/22/2021         RE: Dara Lamb  [address]  [place] [address]        Dear Colleague,    Thank you for referring your patient, Dara Lamb, to the [facility]. Please see a copy of my visit note below.    Dara is a 66 year old who is being evaluated via a billable video visit.      How would you like to obtain your AVS? MyChart  If the video visit is dropped, the invitation should be resent by: Text to cell phone: [phone]  Will anyone else be joining your video visit? No      Video Start Time: 9:30 AM  Video-Visit Details    Type of service:  Video Visit    Video End Time:9:56 AM    Originating Location (pt. Location): Home    Distant Location (provider location):  [facility]     Platform used for Video Visit: [facility] Initial Visit    Reason for Visit  Dara Lamb is a 66 year old female who is referred by Dr Baker for lung nodule  Pulmonary HPI    - she had CT to evaluate productive cough, the cough is improved with increasing acid blocker medication. The cough is about 50% better  -She reports a history of asthma that has not required treatment lately, mostly is related to exposure to known allergens.      Other active medical problems include:   - breast cancer 2011 s/p resection, chemo and radiation   - thyroid cancer 2012  - cervical cancer - colposcopy   - mild intermittent asthma with allergic component cats/dogs    Exposure history: Denies asbestos or radon exposure   TB risk factors: No  Prior Imaging:Yes  Constitutional Symptoms: No  Personal history of cancer:Yes  Up to date on age-appropriate cancer screening:Yes    ROS Pulmonary  Dyspnea: No, Cough: Yes, Chest pain: No, Wheezing: No, Sputum Production: No, Hemoptysis: No  A complete ROS was otherwise negative except as noted in the HPI.  The patient was seen and examined by Kristine Fonseca MD    Current Outpatient Medications   Medication     albuterol (VENTOLIN HFA) 108 (90 Base) MCG/ACT inhaler     atomoxetine (STRATTERA) 100 MG capsule     atomoxetine (STRATTERA) 60 MG capsule     calcium carbonate (OS-RUBEN 500 MG [place]. CA) 500 MG tablet     cyclobenzaprine (FLEXERIL) 10 MG tablet     diltiazem ER (DILT-XR) 240 MG 24 hr ER beaded capsule     diltiazem ER COATED BEADS (CARDIZEM CD/CARTIA XT) 240 MG 24 hr capsule     estradiol (ESTRACE) 0.1 MG/GM vaginal cream     eszopiclone (LUNESTA) 3 MG tablet     fish oil-omega-3 fatty acids 1000 MG capsule     gabapentin (NEURONTIN) 600 MG tablet     ibuprofen (ADVIL/MOTRIN) 200 MG tablet     irbesartan (AVAPRO) 150 MG tablet     latanoprost (XALATAN) 0.005 % ophthalmic solution     lurasidone (LATUDA) 60 MG TABS tablet     montelukast (SINGULAIR) 10 MG tablet     multivitamin, therapeutic with minerals (THERA-VIT-M) TABS     OYSCO 500 + D 500-200 MG-UNIT tablet     pantoprazole (PROTONIX) 40 MG EC tablet     polyethylene glycol (MIRALAX) powder     QUEtiapine (SEROQUEL XR) 50 MG TB24     rosuvastatin (CRESTOR) 40 MG tablet     solifenacin (VESICARE) 5 MG tablet     SYNTHROID 150 MCG tablet     timolol maleate (TIMOPTIC) 0.5 % ophthalmic solution     vitamin  B complex with vitamin C (STRESS TAB) TABS     vortioxetine (TRINTELLIX) 20 MG tablet     No current facility-administered medications for this visit.     Allergies   Allergen Reactions     Contrast Dye Shortness Of Breath     Diagnostic X-Ray Materials      Dust Mite Extract Other (See Comments)     Sneezing     Dust Mites Other (See Comments)     Sneezing around dogs and cats     Hydrochlorothiazide      Latex      Latex      Lipitor [Atorvastatin Calcium]      myalgias     Oxycodone-Acetaminophen Nausea and Vomiting     Sulfa Drugs      arthralgias     Vicodin [Hydrocodone-Acetaminophen]      Nausea, vomiting     Wound Dressing Adhesive      PN: And type of tape with adhesive other than silk tape     Social  History     Socioeconomic History     Marital status:      Spouse name: Not on file     Number of children: Not on file     Years of education: Not on file     Highest education level: Not on file   Occupational History     Not on file   Tobacco Use     Smoking status: Never Smoker     Smokeless tobacco: Never Used   Substance and Sexual Activity     Alcohol use: No     Drug use: No     Sexual activity: Not on file   Other Topics Concern     Not on file   Social History Narrative    .  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  Has significant (Bari). Three children all with ADD.  Son with autism (lives with her).  Daughter, age 21, graduating from [employer] this year. No tobacco use.  Previous excessive alcohol use, now in remission.    2020     Social Determinants of Health     Financial Resource Strain: Not on file   Food Insecurity: Not on file   Transportation Needs: Not on file   Physical Activity: Not on file   Stress: Not on file   Social Connections: Not on file   Intimate Partner Violence: Not on file   Housing Stability: Not on file     Past Medical History:   Diagnosis Date     Abnormal Pap smear and cervical HPV (human papillomavirus)     DARIO, conization 1990     Acne      Alcohol abuse      Bipolar affective disorder (H)      Breast cancer in female (H) 2011    invasive ductal ca, stage IIB, poorly differentiated,  ER and MI positive, lumpectomy,      Cerebral aneurysm, nonruptured     ophthalmic branch of left ICA. .  Presented with syncope     Closed Colles' fracture      dermatofibrosarcoma     1998     Diverticulitis of colon (without mention of hemorrhage)(562.11)     2007     Esophageal reflux      Essential hypertension, benign      Glaucoma      Lumbago     right spinal canal cyst through L1-2 neuroforamin with large benign root sleeve cyst[facility]     Major depressive disorder      Major depressive disorder, recurrent episode, unspecified     Suicide attempt 10/2000     Menopause     2004     Migraine without aura, without mention of intractable migraine without mention of status migrainosus      Neoplasm of unspecified nature of breast     3.9 cm Stage 2B IDCa ER/MN +, HER2- 5/3/11right breast     Obstructive sleep apnea (adult) (pediatric)      Other and unspecified hyperlipidemia      Postmenopausal bleeding      biopsy negative     Primary thyroid papillary carcinoma (H)      Rectal bleeding     diverticular bleeding , admitted to [facility]     Restless legs syndrome (RLS)      Spinal stenosis, lumbar region, without neurogenic claudication      L4-5     Thyroid cancer (H) 2012    Stage OMAR, papillary ca, s/p thyroidectomy and mod left neck dissection     Past Surgical History:   Procedure Laterality Date      SECTION      , 87, 97     COLECTOMY LEFT      sigmoid colon , diverticulitis, colostomy  reanastomosis     COLONOSCOPY      2009     LAMINECTOMY LUMBAR ONE LEVEL      for cauda equina syndrome, 2006     LAPAROTOMY EXPLORATORY      , for ?bowel obstx r/o pelvic infection     LUMPECTOMY BREAST      right breast 5/3/11     MAMMOPLASTY REDUCTION BILATERAL      11     THYROIDECTOMY       TONSILLECTOMY      10/26/03     WRIST SURGERY       Family History   Problem Relation Age of Onset     Prostate Cancer Father      Alcohol/Drug Father      Lipids Father      Cerebrovascular Disease Father         incidental finding on MRI     Alcohol/Drug Brother      Psychotic Disorder Son         autism     Psychotic Disorder Other         ADD (3 children)     C.A.D. Brother         2 MI's with stents     Psychotic Disorder Sister         depression     Psychotic Disorder Brother         depression     Hypertension Mother      Breast Cancer Mother         age 80's     Blood Disease Maternal Grandmother          DVT, PE     Cancer Maternal Grandmother      Heart Disease Brother         Aortic and mitral valve replacement     Asthma Sister      Esophageal Cancer Brother         diet age 61     Heart Disease Mother      Cancer Father      Cancer Sister        Exam:   There were no vitals taken for this visit.  GENERAL APPEARANCE: Well developed, well nourished, alert, and in no apparent distress.  PSYCH: mentation appears normal. and affect normal/bright  Results:  - My interpretation of the images relevant for this visit includes: CT chest 10/22/2021 images reviewed and compared to prior in 2014.  There is one pleural-based right lower lobe nodule that is 4 mm in size, on the left there is a 4 mm opacity in the fissure, another 4 mm nodule in the left lower lobe.  None of these are particularly concerning in appearance, however I do not see them on prior imaging.  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  She is having shoulder surgery in January and would like to follow-up in February.  My [place] clinic is more convenient for her.  Lung nodule - seen on chest CT, done to evaluate a productive cough that has since improved with twice daily administration of proton pump inhibitor.  She has no identifiable personal risk factors for lung cancer, but does have a history of cervical, breast, thyroid cancers in the past.    We discussed diagnostic possibilities and approach to indeterminate lung nodules, and we are in agreement to conduct a 3-month follow-up CT.      Again, thank you for allowing me to participate in the care of your patient.        Sincerely,        Kristine Fonseca MD

## 2021-11-22 NOTE — LETTER
11/22/2021         RE: Dara Lamb  [address]  [place] [address]        Dear Colleague,    Thank you for referring your patient, Dara Lamb, to the [facility]. Please see a copy of my visit note below.    Dara is a 66 year old who is being evaluated via a billable video visit.      How would you like to obtain your AVS? MyChart  If the video visit is dropped, the invitation should be resent by: Text to cell phone: [phone]  Will anyone else be joining your video visit? No      Video Start Time: 9:30 AM  Video-Visit Details    Type of service:  Video Visit    Video End Time:9:56 AM    Originating Location (pt. Location): Home    Distant Location (provider location):  [facility]     Platform used for Video Visit: [facility] Nodule Clinic Initial Visit    Reason for Visit  Dara Lamb is a 66 year old female who is referred by Dr Baker for lung nodule  Pulmonary HPI    - she had CT to evaluate productive cough, the cough is improved with increasing acid blocker medication. The cough is about 50% better  -She reports a history of asthma that has not required treatment lately, mostly is related to exposure to known allergens.      Other active medical problems include:   - breast cancer 2011 s/p resection, chemo and radiation   - thyroid cancer 2012  - cervical cancer - colposcopy   - mild intermittent asthma with allergic component cats/dogs    Exposure history: Denies asbestos or radon exposure   TB risk factors: No  Prior Imaging:Yes  Constitutional Symptoms: No  Personal history of cancer:Yes  Up to date on age-appropriate cancer screening:Yes    ROS Pulmonary  Dyspnea: No, Cough: Yes, Chest pain: No, Wheezing: No, Sputum Production: No, Hemoptysis: No  A complete ROS was otherwise negative except as noted in the HPI.  The patient was seen and examined by Kristine Fonseca MD    Current Outpatient Medications   Medication     albuterol (VENTOLIN HFA) 108 (90 Base) MCG/ACT inhaler     atomoxetine (STRATTERA) 100 MG capsule     atomoxetine (STRATTERA) 60 MG capsule     calcium carbonate (OS-RUBEN 500 MG [place]. [place]) 500 MG tablet     cyclobenzaprine (FLEXERIL) 10 MG tablet     diltiazem ER (DILT-XR) 240 MG 24 hr ER beaded capsule     diltiazem ER COATED BEADS (CARDIZEM CD/CARTIA XT) 240 MG 24 hr capsule     estradiol (ESTRACE) 0.1 MG/GM vaginal cream     eszopiclone (LUNESTA) 3 MG tablet     fish oil-omega-3 fatty acids 1000 MG capsule     gabapentin (NEURONTIN) 600 MG tablet     ibuprofen (ADVIL/MOTRIN) 200 MG tablet     irbesartan (AVAPRO) 150 MG tablet     latanoprost (XALATAN) 0.005 % ophthalmic solution     lurasidone (LATUDA) 60 MG TABS tablet     montelukast (SINGULAIR) 10 MG tablet     multivitamin, therapeutic with minerals (THERA-VIT-M) TABS     OYSCO 500 + D 500-200 MG-UNIT tablet     pantoprazole (PROTONIX) 40 MG EC tablet     polyethylene glycol (MIRALAX) powder     QUEtiapine (SEROQUEL XR) 50 MG TB24     rosuvastatin (CRESTOR) 40 MG tablet     solifenacin (VESICARE) 5 MG tablet     SYNTHROID 150 MCG tablet     timolol maleate (TIMOPTIC) 0.5 % ophthalmic solution     vitamin  B complex with vitamin C (STRESS TAB) TABS     vortioxetine (TRINTELLIX) 20 MG tablet     No current facility-administered medications for this visit.     Allergies   Allergen Reactions     Contrast Dye Shortness Of Breath     Diagnostic X-Ray Materials      Dust Mite Extract Other (See Comments)     Sneezing     Dust Mites Other (See Comments)     Sneezing around dogs and cats     Hydrochlorothiazide      Latex      Latex      Lipitor [Atorvastatin Calcium]      myalgias     Oxycodone-Acetaminophen Nausea and Vomiting     Sulfa Drugs      arthralgias     Vicodin [Hydrocodone-Acetaminophen]      Nausea, vomiting     Wound Dressing Adhesive      PN: And type of tape with adhesive other than silk tape     Social  History     Socioeconomic History     Marital status:      Spouse name: Not on file     Number of children: Not on file     Years of education: Not on file     Highest education level: Not on file   Occupational History     Not on file   Tobacco Use     Smoking status: Never Smoker     Smokeless tobacco: Never Used   Substance and Sexual Activity     Alcohol use: No     Drug use: No     Sexual activity: Not on file   Other Topics Concern     Not on file   Social History Narrative    .  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  Has significant (Bari). Three children all with ADD.  Son with autism (lives with her).  Daughter, age 21, graduating from [employer] this year. No tobacco use.  Previous excessive alcohol use, now in remission.    2020     Social Determinants of Health     Financial Resource Strain: Not on file   Food Insecurity: Not on file   Transportation Needs: Not on file   Physical Activity: Not on file   Stress: Not on file   Social Connections: Not on file   Intimate Partner Violence: Not on file   Housing Stability: Not on file     Past Medical History:   Diagnosis Date     Abnormal Pap smear and cervical HPV (human papillomavirus)     DARIO, conization 1990     Acne      Alcohol abuse      Bipolar affective disorder (H)      Breast cancer in female (H) 2011    invasive ductal ca, stage IIB, poorly differentiated,  ER and HI positive, lumpectomy,      Cerebral aneurysm, nonruptured     ophthalmic branch of left ICA. .  Presented with syncope     Closed Colles' fracture      dermatofibrosarcoma     1998     Diverticulitis of colon (without mention of hemorrhage)(562.11)     2007     Esophageal reflux      Essential hypertension, benign      Glaucoma      Lumbago     right spinal canal cyst through L1-2 neuroforamin with large benign root sleeve cyst[facility]     Major depressive disorder      Major depressive disorder, recurrent episode, unspecified     Suicide attempt 10/2000     Menopause     2004     Migraine without aura, without mention of intractable migraine without mention of status migrainosus      Neoplasm of unspecified nature of breast     3.9 cm Stage 2B IDCa ER/PA +, HER2- 5/3/11right breast     Obstructive sleep apnea (adult) (pediatric)      Other and unspecified hyperlipidemia      Postmenopausal bleeding      biopsy negative     Primary thyroid papillary carcinoma (H)      Rectal bleeding     diverticular bleeding , admitted to [facility]     Restless legs syndrome (RLS)      Spinal stenosis, lumbar region, without neurogenic claudication      L4-5     Thyroid cancer (H) 2012    Stage OMAR, papillary ca, s/p thyroidectomy and mod left neck dissection     Past Surgical History:   Procedure Laterality Date      SECTION      , 87, 97     COLECTOMY LEFT      sigmoid colon , diverticulitis, colostomy  reanastomosis     COLONOSCOPY      2009     LAMINECTOMY LUMBAR ONE LEVEL      for cauda equina syndrome, 2006     LAPAROTOMY EXPLORATORY      , for ?bowel obstx r/o pelvic infection     LUMPECTOMY BREAST      right breast 5/3/11     MAMMOPLASTY REDUCTION BILATERAL      11     THYROIDECTOMY       TONSILLECTOMY      10/26/03     WRIST SURGERY       Family History   Problem Relation Age of Onset     Prostate Cancer Father      Alcohol/Drug Father      Lipids Father      Cerebrovascular Disease Father         incidental finding on MRI     Alcohol/Drug Brother      Psychotic Disorder Son         autism     Psychotic Disorder Other         ADD (3 children)     C.A.D. Brother         2 MI's with stents     Psychotic Disorder Sister         depression     Psychotic Disorder Brother         depression     Hypertension Mother      Breast Cancer Mother         age 80's     Blood Disease Maternal Grandmother          DVT, PE     Cancer Maternal Grandmother      Heart Disease Brother         Aortic and mitral valve replacement     Asthma Sister      Esophageal Cancer Brother         diet age 61     Heart Disease Mother      Cancer Father      Cancer Sister        Exam:   There were no vitals taken for this visit.  GENERAL APPEARANCE: Well developed, well nourished, alert, and in no apparent distress.  PSYCH: mentation appears normal. and affect normal/bright  Results:  - My interpretation of the images relevant for this visit includes: CT chest 10/22/2021 images reviewed and compared to prior in 2014.  There is one pleural-based right lower lobe nodule that is 4 mm in size, on the left there is a 4 mm opacity in the fissure, another 4 mm nodule in the left lower lobe.  None of these are particularly concerning in appearance, however I do not see them on prior imaging.  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  She is having shoulder surgery in January and would like to follow-up in February.  My [place] clinic is more convenient for her.  Lung nodule - seen on chest CT, done to evaluate a productive cough that has since improved with twice daily administration of proton pump inhibitor.  She has no identifiable personal risk factors for lung cancer, but does have a history of cervical, breast, thyroid cancers in the past.    We discussed diagnostic possibilities and approach to indeterminate lung nodules, and we are in agreement to conduct a 3-month follow-up CT.      Again, thank you for allowing me to participate in the care of your patient.        Sincerely,        Kristine Fonseca MD

## 2021-11-22 NOTE — PROGRESS NOTES
Dara is a 66 year old who is being evaluated via a billable video visit.      How would you like to obtain your AVS? MyChart  If the video visit is dropped, the invitation should be resent by: Text to cell phone: [phone]  Will anyone else be joining your video visit? No      Video Start Time: 9:30 AM  Video-Visit Details    Type of service:  Video Visit    Video End Time:9:56 AM    Originating Location (pt. Location): Home    Distant Location (provider location):  [facility]     Platform used for Video Visit: Well     [facility] Initial Visit    Reason for Visit  Dara Lamb is a 66 year old female who is referred by Dr Baker for lung nodule  Pulmonary HPI    - she had CT to evaluate productive cough, the cough is improved with increasing acid blocker medication. The cough is about 50% better  -She reports a history of asthma that has not required treatment lately, mostly is related to exposure to known allergens.      Other active medical problems include:   - breast cancer 2011 s/p resection, chemo and radiation   - thyroid cancer 2012  - cervical cancer - colposcopy   - mild intermittent asthma with allergic component cats/dogs    Exposure history: Denies asbestos or radon exposure   TB risk factors: No  Prior Imaging:Yes  Constitutional Symptoms: No  Personal history of cancer:Yes  Up to date on age-appropriate cancer screening:Yes    ROS Pulmonary  Dyspnea: No, Cough: Yes, Chest pain: No, Wheezing: No, Sputum Production: No, Hemoptysis: No  A complete ROS was otherwise negative except as noted in the HPI.  The patient was seen and examined by Kristine Fonseca MD   Current Outpatient Medications   Medication     albuterol (VENTOLIN HFA) 108 (90 Base) MCG/ACT inhaler     atomoxetine (STRATTERA) 100 MG capsule     atomoxetine (STRATTERA) 60 MG capsule     calcium carbonate (OS-RUBEN 500 MG [place]. [place]) 500 MG tablet      cyclobenzaprine (FLEXERIL) 10 MG tablet     diltiazem ER (DILT-XR) 240 MG 24 hr ER beaded capsule     diltiazem ER COATED BEADS (CARDIZEM CD/CARTIA XT) 240 MG 24 hr capsule     estradiol (ESTRACE) 0.1 MG/GM vaginal cream     eszopiclone (LUNESTA) 3 MG tablet     fish oil-omega-3 fatty acids 1000 MG capsule     gabapentin (NEURONTIN) 600 MG tablet     ibuprofen (ADVIL/MOTRIN) 200 MG tablet     irbesartan (AVAPRO) 150 MG tablet     latanoprost (XALATAN) 0.005 % ophthalmic solution     lurasidone (LATUDA) 60 MG TABS tablet     montelukast (SINGULAIR) 10 MG tablet     multivitamin, therapeutic with minerals (THERA-VIT-M) TABS     OYSCO 500 + D 500-200 MG-UNIT tablet     pantoprazole (PROTONIX) 40 MG EC tablet     polyethylene glycol (MIRALAX) powder     QUEtiapine (SEROQUEL XR) 50 MG TB24     rosuvastatin (CRESTOR) 40 MG tablet     solifenacin (VESICARE) 5 MG tablet     SYNTHROID 150 MCG tablet     timolol maleate (TIMOPTIC) 0.5 % ophthalmic solution     vitamin  B complex with vitamin C (STRESS TAB) TABS     vortioxetine (TRINTELLIX) 20 MG tablet     No current facility-administered medications for this visit.     Allergies   Allergen Reactions     Contrast Dye Shortness Of Breath     Diagnostic X-Ray Materials      Dust Mite Extract Other (See Comments)     Sneezing     Dust Mites Other (See Comments)     Sneezing around dogs and cats     Hydrochlorothiazide      Latex      Latex      Lipitor [Atorvastatin Calcium]      myalgias     Oxycodone-Acetaminophen Nausea and Vomiting     Sulfa Drugs      arthralgias     Vicodin [Hydrocodone-Acetaminophen]      Nausea, vomiting     Wound Dressing Adhesive      PN: And type of tape with adhesive other than silk tape     Social History     Socioeconomic History     Marital status:      Spouse name: Not on file     Number of children: Not on file     Years of education: Not on file     Highest education level: Not on file   Occupational History     Not on file   Tobacco  Use     Smoking status: Never Smoker     Smokeless tobacco: Never Used   Substance and Sexual Activity     Alcohol use: No     Drug use: No     Sexual activity: Not on file   Other Topics Concern     Not on file   Social History Narrative    .  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  Has significant (Bari). Three children all with ADD.  Son with autism (lives with her).  Daughter, age 21, graduating from [employer] this year. No tobacco use.  Previous excessive alcohol use, now in remission.    2020     Social Determinants of Health     Financial Resource Strain: Not on file   Food Insecurity: Not on file   Transportation Needs: Not on file   Physical Activity: Not on file   Stress: Not on file   Social Connections: Not on file   Intimate Partner Violence: Not on file   Housing Stability: Not on file     Past Medical History:   Diagnosis Date     Abnormal Pap smear and cervical HPV (human papillomavirus)     DARIO, conization 1990     Acne      Alcohol abuse      Bipolar affective disorder (H)      Breast cancer in female (H) 2011    invasive ductal ca, stage IIB, poorly differentiated,  ER and AK positive, lumpectomy,      Cerebral aneurysm, nonruptured     ophthalmic branch of left ICA. .  Presented with syncope     Closed Colles' fracture      dermatofibrosarcoma     1998     Diverticulitis of colon (without mention of hemorrhage)(562.11)     2007     Esophageal reflux      Essential hypertension, benign      Glaucoma      Lumbago     right spinal canal cyst through L1-2 neuroforamin with large benign root sleeve cyst, Dr[facility]     Major depressive disorder      Major depressive disorder, recurrent episode, unspecified     Suicide attempt 10/2000     Menopause     2004     Migraine without aura, without mention of intractable migraine without mention of status migrainosus       Neoplasm of unspecified nature of breast     3.9 cm Stage 2B IDCa ER/AZ +, HER2- 5/3/11right breast     Obstructive sleep apnea (adult) (pediatric)      Other and unspecified hyperlipidemia      Postmenopausal bleeding      biopsy negative     Primary thyroid papillary carcinoma (H)      Rectal bleeding     diverticular [facility] , [facility]     Restless legs syndrome (RLS)      Spinal stenosis, lumbar region, without neurogenic claudication      L4-5     Thyroid cancer (H) 2012    Stage OMAR, papillary ca, s/p thyroidectomy and mod left neck dissection     Past Surgical History:   Procedure Laterality Date      SECTION      , 87, 97     COLECTOMY LEFT      sigmoid colon , diverticulitis, colostomy  reanastomosis     COLONOSCOPY      2009     LAMINECTOMY LUMBAR ONE LEVEL      for cauda equina syndrome, 2006     LAPAROTOMY EXPLORATORY       for ?bowel obstx r/o pelvic infection     LUMPECTOMY BREAST      right breast 5/3/11     MAMMOPLASTY REDUCTION BILATERAL      11     THYROIDECTOMY       TONSILLECTOMY      10/26/03     WRIST SURGERY       Family History   Problem Relation Age of Onset     Prostate Cancer Father      Alcohol/Drug Father      Lipids Father      Cerebrovascular Disease Father         incidental finding on MRI     Alcohol/Drug Brother      Psychotic Disorder Son         autism     Psychotic Disorder Other         ADD (3 children)     C.A.D. Brother         2 MI's with stents     Psychotic Disorder Sister         depression     Psychotic Disorder Brother         depression     Hypertension Mother      Breast Cancer Mother         age 80's     Blood Disease Maternal Grandmother         DVT, PE     Cancer Maternal Grandmother      Heart Disease Brother         Aortic and mitral valve replacement     Asthma Sister      Esophageal Cancer Brother         diet age 61     Heart Disease Mother      Cancer Father      Cancer Sister         Exam:   There were no vitals taken for this visit.  GENERAL APPEARANCE: Well developed, well nourished, alert, and in no apparent distress.  PSYCH: mentation appears normal. and affect normal/bright  Results:  - My interpretation of the images relevant for this visit includes: CT chest 10/22/2021 images reviewed and compared to prior in 2014.  There is one pleural-based right lower lobe nodule that is 4 mm in size, on the left there is a 4 mm opacity in the fissure, another 4 mm nodule in the left lower lobe.  None of these are particularly concerning in appearance, however I do not see them on prior imaging.  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  She is having shoulder surgery in January and would like to follow-up in February.  My [facility] clinic is more convenient for her.  Lung nodule - seen on chest CT, done to evaluate a productive cough that has since improved with twice daily administration of proton pump inhibitor.  She has no identifiable personal risk factors for lung cancer, but does have a history of cervical, breast, thyroid cancers in the past.    We discussed diagnostic possibilities and approach to indeterminate lung nodules, and we are in agreement to conduct a 3-month follow-up CT.

## 2021-11-22 NOTE — PATIENT INSTRUCTIONS
Let's repeat a CT in February.   I will plan for a follow up visit in my [place] clinic:     [facility] [facility]  Interventional Pulmonology and Lung Nodule Clinic  [address] [place] [address]  Pulmonary Nurse Care Coordinator Razia Abdi, RN  Phone: [phone]  Fax: [phone]    Follow up with Dr. Fonseca scheduled 2/9/22  Waiting for CT to be scheduled (in work que as of 12/10/21)    Erika Hanson, RN, BSN    RN Care Coordinator  [facility]  [phone]

## 2022-01-04 ENCOUNTER — OFFICE VISIT (OUTPATIENT)
Dept: INTERNAL MEDICINE | Facility: CLINIC | Age: 67
End: 2022-01-04
Payer: COMMERCIAL

## 2022-01-04 VITALS
OXYGEN SATURATION: 97 % | WEIGHT: 211 LBS | HEIGHT: 63 IN | DIASTOLIC BLOOD PRESSURE: 80 MMHG | HEART RATE: 87 BPM | BODY MASS INDEX: 37.39 KG/M2 | SYSTOLIC BLOOD PRESSURE: 116 MMHG

## 2022-01-04 DIAGNOSIS — Z01.818 PREOP GENERAL PHYSICAL EXAM: Primary | ICD-10-CM

## 2022-01-04 LAB
ATRIAL RATE - MUSE: 82 BPM
DIASTOLIC BLOOD PRESSURE - MUSE: NORMAL MMHG
INTERPRETATION ECG - MUSE: NORMAL
P AXIS - MUSE: 62 DEGREES
PR INTERVAL - MUSE: 142 MS
QRS DURATION - MUSE: 90 MS
QT - MUSE: 400 MS
QTC - MUSE: 467 MS
R AXIS - MUSE: -37 DEGREES
SYSTOLIC BLOOD PRESSURE - MUSE: NORMAL MMHG
T AXIS - MUSE: 38 DEGREES
VENTRICULAR RATE- MUSE: 82 BPM

## 2022-01-04 PROCEDURE — 99214 OFFICE O/P EST MOD 30 MIN: CPT | Mod: 25 | Performed by: STUDENT IN AN ORGANIZED HEALTH CARE EDUCATION/TRAINING PROGRAM

## 2022-01-04 PROCEDURE — 93000 ELECTROCARDIOGRAM COMPLETE: CPT | Performed by: STUDENT IN AN ORGANIZED HEALTH CARE EDUCATION/TRAINING PROGRAM

## 2022-01-04 RX ORDER — GABAPENTIN 300 MG/1
300 CAPSULE ORAL AT BEDTIME
COMMUNITY
Start: 2021-10-01

## 2022-01-04 ASSESSMENT — MIFFLIN-ST. JEOR: SCORE: 1466.22

## 2022-01-04 ASSESSMENT — PAIN SCALES - GENERAL: PAINLEVEL: SEVERE PAIN (7)

## 2022-01-04 NOTE — NURSING NOTE
Chief Complaint   Patient presents with     Pre-Op Exam       АНДРЕЙ Kerr at 8:17 AM on 1/4/2022.

## 2022-01-04 NOTE — PROGRESS NOTES
Surgeon (please enter first and last name): Dr. Gordon  Fax number for Preop Evaluation: [phone]  Location of Surgery: [facility] in [place]  Date of Surgery: 1/12/22 @ 10:30 am  Procedure: Repair torn rotator cuff R shoulder  History of reaction to anesthesia? No    Maurice Martin, EMT at 8:11 AM on 1/4/2022.

## 2022-01-07 ENCOUNTER — TELEPHONE (OUTPATIENT)
Dept: INTERNAL MEDICINE | Facility: CLINIC | Age: 67
End: 2022-01-07
Payer: COMMERCIAL

## 2022-01-07 NOTE — TELEPHONE ENCOUNTER
Waiting for Pre-op visit note to be signed.    Message sent to attending physician to co-sign.      Joey El CMA ([facility]) at 2:38 PM on 1/7/2022

## 2022-01-07 NOTE — TELEPHONE ENCOUNTER
[facility] Call Center    Phone Message    May a detailed message be left on voicemail: yes     Reason for Call: Other: Patient calling stating she heard from [facility] regarding her pre op information, patient had a pre op appointment on 1/4/2022 and the forms have not been recieved by [facility]. Please fax forms to [phone] as patients surgery is on 1/12/2022 thank you.      Action Taken: Message routed to:  [facility]    Travel Screening: Not Applicable

## 2022-01-10 NOTE — TELEPHONE ENCOUNTER
Called and left pt VM.  Pre-op exam faxed today around 10:55 AM.      Joey El CMA ([facility]) at 11:02 AM on 1/10/2022

## 2022-03-11 ENCOUNTER — TRANSFERRED RECORDS (OUTPATIENT)
Dept: HEALTH INFORMATION MANAGEMENT | Facility: CLINIC | Age: 67
End: 2022-03-11
Payer: COMMERCIAL

## 2022-04-08 ENCOUNTER — TRANSFERRED RECORDS (OUTPATIENT)
Dept: HEALTH INFORMATION MANAGEMENT | Facility: CLINIC | Age: 67
End: 2022-04-08
Payer: COMMERCIAL

## 2022-04-08 DIAGNOSIS — I10 ESSENTIAL HYPERTENSION, BENIGN: ICD-10-CM

## 2022-04-11 DIAGNOSIS — R05.3 PERSISTENT COUGH: ICD-10-CM

## 2022-04-11 RX ORDER — MONTELUKAST SODIUM 10 MG/1
10 TABLET ORAL AT BEDTIME
Qty: 90 TABLET | Refills: 2 | Status: SHIPPED | OUTPATIENT
Start: 2022-04-11 | End: 2023-01-24

## 2022-04-11 NOTE — TELEPHONE ENCOUNTER
Last Clinic Visit: 1/4/2022  [facility] Internal Medicine [place]

## 2022-04-12 RX ORDER — DILTIAZEM HYDROCHLORIDE 240 MG/1
CAPSULE, COATED, EXTENDED RELEASE ORAL
Qty: 90 CAPSULE | Refills: 3 | OUTPATIENT
Start: 2022-04-12

## 2022-04-12 NOTE — TELEPHONE ENCOUNTER
DILTIAZEM 24H ER(CD) 240 MG CP      Last Written Prescription Date:  9/30/21  Last Fill Quantity: 90,   # refills: 3  Sent to: [facility]/PHARMACY #[id number] - [place] - [address]  Last Office Visit : 1/4/22 Wil[facility]  Future Office visit:  8/322 Francisco Javier [facility]     ordered by Dr Mcintyre Diltiazem 240 mg  9/30/21 #90/ # Rf to [id number]

## 2022-05-28 DIAGNOSIS — K21.9 GASTROESOPHAGEAL REFLUX DISEASE WITHOUT ESOPHAGITIS: ICD-10-CM

## 2022-06-02 DIAGNOSIS — G89.29 CHRONIC LOW BACK PAIN WITHOUT SCIATICA, UNSPECIFIED BACK PAIN LATERALITY: ICD-10-CM

## 2022-06-02 DIAGNOSIS — M54.50 CHRONIC LOW BACK PAIN WITHOUT SCIATICA, UNSPECIFIED BACK PAIN LATERALITY: ICD-10-CM

## 2022-06-02 RX ORDER — PANTOPRAZOLE SODIUM 40 MG/1
40 TABLET, DELAYED RELEASE ORAL 2 TIMES DAILY
Qty: 180 TABLET | Refills: 0 | Status: SHIPPED | OUTPATIENT
Start: 2022-06-02 | End: 2023-02-13

## 2022-06-02 NOTE — TELEPHONE ENCOUNTER
pantoprazole (PROTONIX) 40 MG EC tablet    1/4/2022  [facility] Internal Medicine [place]   Steve Aguilera Jr., MD    Internal Medicine     Routed because: who to sign?

## 2022-06-06 RX ORDER — CYCLOBENZAPRINE HCL 10 MG
10 TABLET ORAL DAILY PRN
Qty: 90 TABLET | Refills: 3 | OUTPATIENT
Start: 2022-06-06

## 2022-06-06 NOTE — TELEPHONE ENCOUNTER
[facility] - Child and Adolescent Day Treatment Program  Psychological Evaluation      Miko Hsu  YOB: 2005  Psychiatric Evaluation - child and adolescent day treatment program  Date of Service:  03/02/2018    CHIEF COMPLAINT:  Patient reported that he is unaware why his mother brought him in for an evaluation.  Reports indicate a history of mood instability influencing tantrums and opposition.    BACKGROUND INFORMATION:  Patient is a 13-year-old  male who was referred to the child and adolescent day treatment program due to concerns with regards to mood instability.  Patient reported that he currently lives with his mother, father, and 10 year old brother.  He described his relationship with his mother as “okay” and stated that he “mostly” gets along with his father.  He reported that he does not have much interaction with his father because of his father’s work schedule.  He denied being oppositional.  He replied “it’s okay, we argue sometimes” when asked about his relationship with his brother.  He indicated that he does not feel he has anyone in his life that he considers a great support.  When punished, patient reported that he has to do physical activities, such as pushups and jumping jacks.      Patient reported that he is currently in the 7th grade and reports indicate that he has qualified for special education services.  He reported that he does not like school “because of some of the kids” but also indicated that he enjoys gym and [employer] class.  He reported having “one friend” when asked if he gets along with peers.  He continued to explain “some kids don’t like to get along with people.”  He denied that his school work is too difficult for him.  He reported getting into trouble in school for being “impulsive.”     HISTORY OF PRESENT SYMPTOMS:  Patient is currently presenting with mood instability.  He presents with outbursts at home and school.  At  [employer]; last rx:9-30-21 for 90 tab w/3 RF   school he gets into verbal confrontations with peers.  Reports indicate some bullying directed towards patient.  Patient has been placed on intermediate due to his behaviors.  At home he is irritable and argumentative with his mother.  He had “explosive” outbursts where he begins to yell and swear.  He has a history of psychiatric treatment for mood instability.  Patient’s mother reported that he has trouble staying asleep.      Patient reported that he often feels sad and that this has been a concern for years.  He replied “not really” when asked about suicidal ideations.  He denied a history of attempts to harm himself intentionally.  He reported that he often gets angry and identified “headaches” and being “agitated” as triggers.  He denied physical aggression but reported that he gets “loud” when he is angry.  He denied that he often feels anxious.  He did not report a history of psychological trauma.  Patient’s mother reported that he experienced a bone infection and needed surgery this past October and reported that patient’s father is in the  when asked about possible trauma.      FAMILY PSYCHIATRIC HISTORY:  Patient’s mother denied a history of family psychiatric treatment.    TREATMENT HISTORY:  Reports indicate a history of psychiatric treatment and state that he is currently prescribed Fluoxetine, Guanfacine, Lamotrigine, Vyvanse and Risperidone.  He also has a history of involvement in a partial hospitalization program.      DEVELOPMENTAL HISTORY AND MEDICAL CONCERNS:  Reports do not indicate significant developmental delays and no reported significant medical issues.    MENTAL STATUS:  Patient was oriented for person, place, time, and circumstance.  Mood and affect was appropriate. Patient was cooperative and attentive.  Speech rate and rhythm was appropriate. Thought process was logical and goal oriented.  He denied hallucinations. He denied current suicidal or homicidal ideations.  Patient  judgment appears fair.  Memory was intact. Estimated intelligence level is in the average range.    STRENGTHS:  Patient reported that he is proud of his ability to play football.  Patient reported that he enjoys playing football and learning American.  Patient demonstrated the ability to be friendly and cooperative. When asked what he needed to work on while in the program, he reported that he does not know.    FORMULATION:  Patient is currently presenting with mood instability.  He escalated and become verbally aggressive.  He is often irritable.  He presents with symptoms at home and school.  He has limited insight into his emotional experience and has underdeveloped coping skills.         DIAGNOSIS  F41.9 Unspecified Anxiety Disorder  F90.9 Unspecified ADHD  F39 Unspecified Mood Disorder  (per records)    PLAN OF TREATMENT:  It is recommended by program psychiatrist that patient be involved in day treatment program.  Patient will receive individual, group, and family psychotherapy.  Patient will also receive psychiatric monitoring of possible medication. Patient will demonstrate increased mood stability.    Recommended length of stay 1-3 months      Completed by Kwasi Espinoza Psy.D.

## 2022-06-17 ENCOUNTER — TRANSFERRED RECORDS (OUTPATIENT)
Dept: HEALTH INFORMATION MANAGEMENT | Facility: CLINIC | Age: 67
End: 2022-06-17

## 2022-07-05 ENCOUNTER — TRANSFERRED RECORDS (OUTPATIENT)
Dept: HEALTH INFORMATION MANAGEMENT | Facility: CLINIC | Age: 67
End: 2022-07-05

## 2022-07-07 DIAGNOSIS — G89.29 CHRONIC LOW BACK PAIN WITHOUT SCIATICA, UNSPECIFIED BACK PAIN LATERALITY: ICD-10-CM

## 2022-07-07 DIAGNOSIS — M54.50 CHRONIC LOW BACK PAIN WITHOUT SCIATICA, UNSPECIFIED BACK PAIN LATERALITY: ICD-10-CM

## 2022-07-12 RX ORDER — CYCLOBENZAPRINE HCL 10 MG
10 TABLET ORAL DAILY PRN
Qty: 90 TABLET | Refills: 0 | Status: SHIPPED | OUTPATIENT
Start: 2022-07-12 | End: 2022-12-16

## 2022-07-12 NOTE — TELEPHONE ENCOUNTER
cyclobenzaprine (FLEXERIL) 10 MG tablet    Take 1 tablet (10 mg) by mouth daily as needed for muscle spasms    Last Written Prescription Date:  9/30/21  Last Fill Quantity: 90,   # refills: 3  Last Office Visit : 1/4/22  Future Office visit:  8/3/22    Routing refill request to provider for review/approval because:  Drug not on the [facility], [facility] or [facility] refill protocol

## 2022-08-03 ENCOUNTER — LAB (OUTPATIENT)
Dept: LAB | Facility: CLINIC | Age: 67
End: 2022-08-03
Payer: COMMERCIAL

## 2022-08-03 ENCOUNTER — OFFICE VISIT (OUTPATIENT)
Dept: INTERNAL MEDICINE | Facility: CLINIC | Age: 67
End: 2022-08-03
Payer: COMMERCIAL

## 2022-08-03 VITALS
BODY MASS INDEX: 38.39 KG/M2 | DIASTOLIC BLOOD PRESSURE: 79 MMHG | SYSTOLIC BLOOD PRESSURE: 115 MMHG | RESPIRATION RATE: 16 BRPM | HEIGHT: 63 IN | OXYGEN SATURATION: 97 % | WEIGHT: 216.7 LBS | HEART RATE: 105 BPM

## 2022-08-03 DIAGNOSIS — E66.01 MORBID OBESITY (H): ICD-10-CM

## 2022-08-03 DIAGNOSIS — Z00.00 ROUTINE GENERAL MEDICAL EXAMINATION AT A HEALTH CARE FACILITY: Primary | ICD-10-CM

## 2022-08-03 DIAGNOSIS — R73.09 ABNORMAL GLUCOSE: ICD-10-CM

## 2022-08-03 DIAGNOSIS — Z85.850 PERSONAL HISTORY OF MALIGNANT NEOPLASM OF THYROID: ICD-10-CM

## 2022-08-03 DIAGNOSIS — E78.2 MIXED HYPERLIPIDEMIA: ICD-10-CM

## 2022-08-03 DIAGNOSIS — Z78.0 POST-MENOPAUSE: ICD-10-CM

## 2022-08-03 DIAGNOSIS — I10 ESSENTIAL HYPERTENSION, BENIGN: ICD-10-CM

## 2022-08-03 LAB
ALBUMIN SERPL-MCNC: 4 G/DL (ref 3.4–5)
ALP SERPL-CCNC: 114 U/L (ref 40–150)
ALT SERPL W P-5'-P-CCNC: 29 U/L (ref 0–50)
ANION GAP SERPL CALCULATED.3IONS-SCNC: 4 MMOL/L (ref 3–14)
AST SERPL W P-5'-P-CCNC: 45 U/L (ref 0–45)
BILIRUB SERPL-MCNC: 0.6 MG/DL (ref 0.2–1.3)
BUN SERPL-MCNC: 11 MG/DL (ref 7–30)
CALCIUM SERPL-MCNC: 9.2 MG/DL (ref 8.5–10.1)
CHLORIDE BLD-SCNC: 108 MMOL/L (ref 94–109)
CHOLEST SERPL-MCNC: 159 MG/DL
CO2 SERPL-SCNC: 28 MMOL/L (ref 20–32)
CREAT SERPL-MCNC: 0.56 MG/DL (ref 0.52–1.04)
FASTING STATUS PATIENT QL REPORTED: NO
GFR SERPL CREATININE-BSD FRML MDRD: >90 ML/MIN/1.73M2
GLUCOSE BLD-MCNC: 114 MG/DL (ref 70–99)
HDLC SERPL-MCNC: 92 MG/DL
LDLC SERPL CALC-MCNC: 55 MG/DL
NONHDLC SERPL-MCNC: 67 MG/DL
POTASSIUM BLD-SCNC: 5 MMOL/L (ref 3.4–5.3)
PROT SERPL-MCNC: 8.3 G/DL (ref 6.8–8.8)
SODIUM SERPL-SCNC: 140 MMOL/L (ref 133–144)
T4 FREE SERPL-MCNC: 1.09 NG/DL (ref 0.76–1.46)
TRIGL SERPL-MCNC: 62 MG/DL
TSH SERPL DL<=0.005 MIU/L-ACNC: 0.14 MU/L (ref 0.4–4)

## 2022-08-03 PROCEDURE — 99397 PER PM REEVAL EST PAT 65+ YR: CPT | Mod: 25 | Performed by: INTERNAL MEDICINE

## 2022-08-03 PROCEDURE — 36415 COLL VENOUS BLD VENIPUNCTURE: CPT | Performed by: PATHOLOGY

## 2022-08-03 PROCEDURE — 80061 LIPID PANEL: CPT | Performed by: PATHOLOGY

## 2022-08-03 PROCEDURE — 80053 COMPREHEN METABOLIC PANEL: CPT | Performed by: PATHOLOGY

## 2022-08-03 PROCEDURE — 82306 VITAMIN D 25 HYDROXY: CPT | Mod: 90 | Performed by: PATHOLOGY

## 2022-08-03 PROCEDURE — 84439 ASSAY OF FREE THYROXINE: CPT | Performed by: PATHOLOGY

## 2022-08-03 PROCEDURE — 90471 IMMUNIZATION ADMIN: CPT | Performed by: INTERNAL MEDICINE

## 2022-08-03 PROCEDURE — 99000 SPECIMEN HANDLING OFFICE-LAB: CPT | Performed by: PATHOLOGY

## 2022-08-03 PROCEDURE — 90677 PCV20 VACCINE IM: CPT | Performed by: INTERNAL MEDICINE

## 2022-08-03 PROCEDURE — 84443 ASSAY THYROID STIM HORMONE: CPT | Performed by: PATHOLOGY

## 2022-08-03 NOTE — PROGRESS NOTES
"Dara was seen today for establish care and to obtain a yearly physical.  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    Generally, she feels well.  Her blood pressure is well controlled.  She is due for some lab work, and also a DEXA and a mammogram.  She did ask me about weight loss.  I mention that semaglutide is FDA approved for weight loss in patients with a BMI over 30, and is very effective in recent studies. Her fasting glucose today is also abnormal, in the prediabetic range.  I am sending in the Rx and also mentioned our weight management clinic is an option.    No changes to past, family or social history and  ROS: 10 point ROS neg other than the symptoms noted above in the HPI.    PHYSICAL EXAM:  /79 (BP Location: Left arm, Patient Position: Sitting, Cuff Size: Adult Large)   Pulse 105   Resp 16   Ht 1.6 m (5' 3\")   Wt 98.3 kg (216 lb 11.2 oz)   SpO2 97%   Breastfeeding No   BMI 38.39 kg/m    Constitutional: no distress, comfortable, pleasant   Eyes: anicteric, normal extra-ocular movements   Ears, Nose and Throat: tympanic membranes clear with moderate cerumen noted, neck supple with full range of motion, surgically absent thyroid.   Cardiovascular: regular rate and rhythm, normal S1 and S2, no murmurs, rubs or gallops, peripheral pulses full and symmetric   Respiratory: clear to auscultation, no wheezes or crackles, normal breath sounds   Gastrointestinal: positive bowel sounds, nontender, no hepatosplenomegaly, no masses   Musculoskeletal: knees full range of motion, no effusion  Skin: visible skin is without concerning lesions, no rash or jaundice   Neurological: normal gait, no tremor   Psychological: appropriate mood   Lymphatic: no cervical lymphadenopathy and no pedal edema      A/P 67 year old here for physical and establish care    Routine general medical examination at " a health care facility  -     PNEUMOCOCCAL 20 VALENT CONJUGATE (PREVNAR 20)  -     Mammogram, screening w magdalena (3D); Future    Morbid obesity (H)  -     semaglutide (OZEMPIC) 2 MG/1.5ML SOPN pen; Inject 0.25 mg Subcutaneous every 7 days    Mixed hyperlipidemia  -     Lipid Profile NON-FASTING; Future    Essential hypertension, benign  -     Comprehensive metabolic panel; Future    Personal history of malignant neoplasm of thyroid  -     TSH with free T4 reflex; Future    Post-menopause  -     Vitamin D Deficiency; Future  -     Dexa hip/pelvis/spine*; Future    Abnormal glucose  -     semaglutide (OZEMPIC) 2 MG/1.5ML SOPN pen; Inject 0.25 mg Subcutaneous every 7 days    RTC in one year or sooner corneliusn    Shalonda Miller MD

## 2022-08-03 NOTE — NURSING NOTE
"Dara Lamb is a 67 year old female patient that presents today in clinic for the following:    Chief Complaint   Patient presents with     Establish Care     The patient's allergies and medications were reviewed as noted. A set of vitals were recorded as noted without incident: /79 (BP Location: Left arm, Patient Position: Sitting, Cuff Size: Adult Large)   Pulse 105   Resp 16   Ht 1.6 m (5' 3\")   Wt 98.3 kg (216 lb 11.2 oz)   SpO2 97%   Breastfeeding No   BMI 38.39 kg/m  . The patient does not have any other questions for the provider.    Raudel Tomlin, EMT at 11:06 AM on 8/3/2022  "

## 2022-08-03 NOTE — PROGRESS NOTES
Dara Lamb received the Prevnar 20 immunization today in clinic at the request of Dr. Shalonda Mcintyre. The immunization was given under the supervision of Dr. Shalonda Mcintyre if assistance was needed. The patient does report a history of adverse reactions associated with vaccine administration; specifically, while the patient was hospitalized, she reported that she developed a mass at the injection site. With this, the patient was advised to alert laboratory staff of any new symptoms after receiving today's immunization. The immunization site was cleaned with an alcohol prep wipe. The immunization was given without incident--see immunization list for administration details. No swelling or redness was observed at the site of injection after the immunization was given. The patient reported to the first floor laboratory for further evaluation.    АНДРЕЙ Pringle at 11:31 AM on 8/3/2022

## 2022-08-04 DIAGNOSIS — K21.9 GASTROESOPHAGEAL REFLUX DISEASE WITHOUT ESOPHAGITIS: ICD-10-CM

## 2022-08-04 LAB — DEPRECATED CALCIDIOL+CALCIFEROL SERPL-MC: 46 UG/L (ref 20–75)

## 2022-08-05 ENCOUNTER — TELEPHONE (OUTPATIENT)
Dept: INTERNAL MEDICINE | Facility: CLINIC | Age: 67
End: 2022-08-05

## 2022-08-05 DIAGNOSIS — I10 ESSENTIAL HYPERTENSION, BENIGN: ICD-10-CM

## 2022-08-05 NOTE — TELEPHONE ENCOUNTER
Received a message from the pharmacy :    Significant back order right now on Ozempic since this is a new start. Could we Rybelsus instead until Ozempic is back consistantly ?

## 2022-08-08 RX ORDER — DILTIAZEM HYDROCHLORIDE 240 MG/1
240 CAPSULE, COATED, EXTENDED RELEASE ORAL DAILY
Qty: 90 CAPSULE | Refills: 3 | Status: SHIPPED | OUTPATIENT
Start: 2022-08-08 | End: 2023-07-06

## 2022-08-08 RX ORDER — PANTOPRAZOLE SODIUM 40 MG/1
40 TABLET, DELAYED RELEASE ORAL 2 TIMES DAILY
OUTPATIENT
Start: 2022-08-08

## 2022-08-08 NOTE — TELEPHONE ENCOUNTER
diltiazem ER (DILT-XR) 240 MG 24 hr ER beaded capsule    8/3/2022  [facility] Internal Medicine [place]     Shalonda Taylor MD    Internal Medicine

## 2022-08-08 NOTE — TELEPHONE ENCOUNTER
PANTOPRAZOLE SOD DR 40 MG TAB      Last Written Prescription Date:  6-2-2022  Last Fill Quantity: 180,   # refills: 0  Last Office Visit : 8-3-2022  Future Office visit:  none    Routing refill request to provider for review/approval because:  Medication list and refill request do not match:    Medication list:  Take 1 tablet (40 mg) by mouth 2 times daily     Request:  Take 1 tablet (40 mg) by mouth daily       Kathleen M Doege RN

## 2022-08-11 NOTE — TELEPHONE ENCOUNTER
Please see Elliot's dosing recommendation.  If you agree with this, I will send the prescription. Thank you.    Soon-Mi

## 2022-08-11 NOTE — TELEPHONE ENCOUNTER
Elliot,    Current Ozempic dose is 0.25 mg Q 7 days. What is the matching dose for Rybelsus ?    Soon-Mi

## 2022-08-13 DIAGNOSIS — R73.09 ABNORMAL GLUCOSE: ICD-10-CM

## 2022-08-13 DIAGNOSIS — E66.01 MORBID OBESITY (H): ICD-10-CM

## 2022-08-16 RX ORDER — SEMAGLUTIDE 1.34 MG/ML
INJECTION, SOLUTION SUBCUTANEOUS
Qty: 1.5 ML | Refills: 11 | Status: SHIPPED | OUTPATIENT
Start: 2022-08-16 | End: 2023-04-26

## 2022-08-17 ENCOUNTER — TELEPHONE (OUTPATIENT)
Dept: INTERNAL MEDICINE | Facility: CLINIC | Age: 67
End: 2022-08-17

## 2022-08-17 DIAGNOSIS — K21.9 GASTROESOPHAGEAL REFLUX DISEASE WITHOUT ESOPHAGITIS: ICD-10-CM

## 2022-08-17 NOTE — TELEPHONE ENCOUNTER
[facility] Call Center    Phone Message    May a detailed message be left on voicemail: yes     Reason for Call: Medication Refill Request    Has the patient contacted the pharmacy for the refill? Yes   Name of medication being requested: pantoprazole (PROTONIX) 40 MG EC tablet  Provider who prescribed the medication: Shalonda Taylor MD  Pharmacy: [facility]/PHARMACY #[id number] - [place], [place] - [address]  Date medication is needed: asap    Patient requesting meds are filled. Patient has been out for 3 weeks. Patient's pharmacy has contacted clinic 3 times and has gotten no response.      Action Taken: Message routed to:  [facility]    Travel Screening: Not Applicable

## 2022-08-17 NOTE — TELEPHONE ENCOUNTER
Call to pharmacy, reports picked up 180 on 7/10/22 for twice daily dosing  Call to Dara, advised pharmacy shows dispensing #180 on 7/9/22 and picked up on 7/10/22.  She states she must have lost the bottle.  She will contact pharmacy, as she has been out of med for 3 weeks.

## 2022-08-30 ENCOUNTER — TRANSFERRED RECORDS (OUTPATIENT)
Dept: HEALTH INFORMATION MANAGEMENT | Facility: CLINIC | Age: 67
End: 2022-08-30

## 2022-09-11 DIAGNOSIS — E66.01 MORBID OBESITY (H): ICD-10-CM

## 2022-09-11 DIAGNOSIS — R73.09 ABNORMAL GLUCOSE: ICD-10-CM

## 2022-09-13 ENCOUNTER — TRANSFERRED RECORDS (OUTPATIENT)
Dept: HEALTH INFORMATION MANAGEMENT | Facility: CLINIC | Age: 67
End: 2022-09-13

## 2022-09-14 RX ORDER — SEMAGLUTIDE 1.34 MG/ML
INJECTION, SOLUTION SUBCUTANEOUS
Refills: 11 | OUTPATIENT
Start: 2022-09-14

## 2022-10-10 ENCOUNTER — HEALTH MAINTENANCE LETTER (OUTPATIENT)
Age: 67
End: 2022-10-10

## 2022-11-16 DIAGNOSIS — I10 ESSENTIAL HYPERTENSION, BENIGN: ICD-10-CM

## 2022-11-19 RX ORDER — IRBESARTAN 150 MG/1
TABLET ORAL
Qty: 90 TABLET | Refills: 2 | Status: SHIPPED | OUTPATIENT
Start: 2022-11-19 | End: 2023-08-28

## 2022-12-16 DIAGNOSIS — M54.50 CHRONIC LOW BACK PAIN WITHOUT SCIATICA, UNSPECIFIED BACK PAIN LATERALITY: ICD-10-CM

## 2022-12-16 DIAGNOSIS — G89.29 CHRONIC LOW BACK PAIN WITHOUT SCIATICA, UNSPECIFIED BACK PAIN LATERALITY: ICD-10-CM

## 2022-12-16 NOTE — TELEPHONE ENCOUNTER
cyclobenzaprine (FLEXERIL) 10 MG tablet      Last Written Prescription Date:  7/12/22  Last Fill Quantity: 90,   # refills: 0  Last Office Visit : 8/3/22  Future Office visit:  none    Routing refill request to provider for review/approval because:  Drug not on the [facility], [facility] or [facility] refill protocol or controlled substance

## 2022-12-20 RX ORDER — CYCLOBENZAPRINE HCL 10 MG
10 TABLET ORAL DAILY PRN
Qty: 90 TABLET | Refills: 3 | Status: ON HOLD | OUTPATIENT
Start: 2022-12-20 | End: 2024-01-03

## 2022-12-29 DIAGNOSIS — E78.2 MIXED HYPERLIPIDEMIA: ICD-10-CM

## 2022-12-30 RX ORDER — ROSUVASTATIN CALCIUM 40 MG/1
TABLET, COATED ORAL
Qty: 90 TABLET | Refills: 2 | Status: SHIPPED | OUTPATIENT
Start: 2022-12-30 | End: 2023-09-28

## 2022-12-30 NOTE — TELEPHONE ENCOUNTER
ROSUVASTATIN CALCIUM 40 MG TAB  Passed protocol    Office Visit  8/3/2022  [facility] [place]   Shalonda Taylor MD  Internal Medicine     Warnings Override History for rosuvastatin (CRESTOR) 40 MG tablet [[id number]]    Overridden by Bello Baker MD on Sep 30, 2021 11:26 AM   Allergy/Contraindication   1. ATORVASTATIN CALCIUM [Level: Drug Class Match] [Reason: Benefit outweighs risk]

## 2023-01-05 NOTE — TELEPHONE ENCOUNTER
----- Message from Damion Houston MD sent at 12/29/2022  9:47 AM CST -----  Can;t get in touch with patient- need to discuss labs, what meds she is actually taking   ----- Message from Kathleen M Doege, RN sent at 6/9/2020  1:20 PM CDT -----  Regarding: Maggy is CLEARLY asking fdor the patient to be scheduled with Dr Prescott - it has been over a year since last seen  Is there more information that you need?  ----- Message -----  From: Maggy Wu RN  Sent: 6/9/2020   1:11 PM CDT  To: Kathleen M Doege, RN, Lizette Greenwood    See below in the message sent previously.  Urology   Bridget Prescott See MD [place]  Next message:  An appt.   last visit 1/17/19. Needs annual appt. Can you see the messages? Is it unseen or unclear? Thank you.    ----- Message -----  From: Lizette Greenwood  Sent: 6/9/2020  12:50 PM CDT  To: Maggy Wu RN    Who does this patient need to be scheduled with and for what reason.  ----- Message -----  From: Maggy Wu RN  Sent: 6/8/2020   7:00 AM CDT  To: Lizette Greenwood    An appt.   last visit 1/17/19. Needs annual appt.  ----- Message -----  From: Lizette Greenwood  Sent: 6/5/2020  10:42 AM CDT  To: Maggy Wu RN    What am I scheduling this patient for?  ----- Message -----  From: Maggy Wu RN  Sent: 6/4/2020  12:53 PM CDT  To: Clinic Coordinators-[facility]    NADEGE Monet [[id number]]    Please call to schedule.  Urology   Bridget Prescott MD        Thanks    I do not need any follow up on the scheduling of this appointment unless the patient will no longer be receiving care in our clinic.

## 2023-01-19 DIAGNOSIS — R05.3 PERSISTENT COUGH: ICD-10-CM

## 2023-01-24 RX ORDER — MONTELUKAST SODIUM 10 MG/1
TABLET ORAL
Qty: 90 TABLET | Refills: 1 | Status: SHIPPED | OUTPATIENT
Start: 2023-01-24 | End: 2023-06-29

## 2023-01-24 NOTE — TELEPHONE ENCOUNTER
MONTELUKAST SOD 10 MG TABLET  Passed protocol    Office Visit  8/3/2022  [facility] Internal Medicine [place]   Shalonda Taylor MD  Internal Medicine         *Last ordered by Dr Easley    Routing to provider for review because:  Not on protocol for diagnosis: Persistent cough [R05.3]

## 2023-02-13 DIAGNOSIS — K21.9 GASTROESOPHAGEAL REFLUX DISEASE WITHOUT ESOPHAGITIS: ICD-10-CM

## 2023-02-13 RX ORDER — PANTOPRAZOLE SODIUM 40 MG/1
40 TABLET, DELAYED RELEASE ORAL 2 TIMES DAILY
Qty: 180 TABLET | Refills: 1 | Status: SHIPPED | OUTPATIENT
Start: 2023-02-13 | End: 2023-07-03

## 2023-02-13 NOTE — TELEPHONE ENCOUNTER
pantoprazole (PROTONIX) 40 MG EC tablet      Last Written Prescription Date:  6-2-22  Last Fill Quantity: 180,   # refills: 0  Last Office Visit : 8-3-22  Future Office visit:  none    Routing refill request to provider for review/approval because:  Gap in RF

## 2023-02-13 NOTE — TELEPHONE ENCOUNTER
[facility] Call Center    Phone Message    May a detailed message be left on voicemail: yes     Reason for Call: Medication Refill Request    Has the patient contacted the pharmacy for the refill? Yes   Name of medication being requested: pantoprazole (PROTONIX) 40 MG EC tablet    Provider who prescribed the medication: Dr. Francisco Javier Miller  Pharmacy: [id number] [place], [place] - [address] AT [facility] [address]  Date medication is needed: 2/13/23

## 2023-04-22 DIAGNOSIS — E66.01 MORBID OBESITY (H): ICD-10-CM

## 2023-04-22 DIAGNOSIS — R73.09 ABNORMAL GLUCOSE: ICD-10-CM

## 2023-04-26 RX ORDER — SEMAGLUTIDE 1.34 MG/ML
0.25 INJECTION, SOLUTION SUBCUTANEOUS
Qty: 1.5 ML | Refills: 2 | Status: SHIPPED | OUTPATIENT
Start: 2023-04-26 | End: 2023-12-28

## 2023-04-26 NOTE — TELEPHONE ENCOUNTER
semaglutide (OZEMPIC, 0.25 OR 0.5 MG/DOSE,) 2 MG/1.5ML SOPN pen     Last Written Prescription Date:  8/16/22  Last Fill Quantity: 1.5,   # refills: 11  Last Office Visit : 8/3/22  Future Office visit:  none    Routing refill request to provider for review/approval because:  Patient requesting new Rx: Ozempic on back order, possible to change dose?

## 2023-06-13 ENCOUNTER — TRANSFERRED RECORDS (OUTPATIENT)
Dept: HEALTH INFORMATION MANAGEMENT | Facility: CLINIC | Age: 68
End: 2023-06-13
Payer: COMMERCIAL

## 2023-06-16 ENCOUNTER — TRANSFERRED RECORDS (OUTPATIENT)
Dept: HEALTH INFORMATION MANAGEMENT | Facility: CLINIC | Age: 68
End: 2023-06-16
Payer: COMMERCIAL

## 2023-06-16 ENCOUNTER — TRANSFERRED RECORDS (OUTPATIENT)
Dept: MULTI SPECIALTY CLINIC | Facility: CLINIC | Age: 68
End: 2023-06-16

## 2023-06-28 DIAGNOSIS — K21.9 GASTROESOPHAGEAL REFLUX DISEASE WITHOUT ESOPHAGITIS: ICD-10-CM

## 2023-06-29 DIAGNOSIS — R05.3 PERSISTENT COUGH: ICD-10-CM

## 2023-07-03 DIAGNOSIS — I10 ESSENTIAL HYPERTENSION, BENIGN: ICD-10-CM

## 2023-07-03 RX ORDER — PANTOPRAZOLE SODIUM 40 MG/1
40 TABLET, DELAYED RELEASE ORAL 2 TIMES DAILY
Qty: 180 TABLET | Refills: 0 | Status: SHIPPED | OUTPATIENT
Start: 2023-07-03 | End: 2023-08-29

## 2023-07-03 RX ORDER — MONTELUKAST SODIUM 10 MG/1
1 TABLET ORAL AT BEDTIME
Qty: 90 TABLET | Refills: 0 | Status: SHIPPED | OUTPATIENT
Start: 2023-07-03 | End: 2024-01-15

## 2023-07-03 NOTE — TELEPHONE ENCOUNTER
montelukast (SINGULAIR) 10 MG tablet    Office Visit    8/3/2022  [facility] Internal Medicine [place]   Shalonda Taylor MD  Internal Medicine

## 2023-07-05 DIAGNOSIS — R73.09 ABNORMAL GLUCOSE: ICD-10-CM

## 2023-07-05 DIAGNOSIS — E66.01 MORBID OBESITY (H): ICD-10-CM

## 2023-07-06 NOTE — TELEPHONE ENCOUNTER
diltiazem ER COATED BEADS (CARDIZEM CD/CARTIA XT) 240 MG 24 hr capsule    Office Visit    8/3/2022  [facility] Internal Medicine [place]   Shalonda Taylor MD  Internal Medicine     Next appt: 9/13/23      Routed to provider for review because:  New provider, approval required

## 2023-07-07 RX ORDER — DILTIAZEM HYDROCHLORIDE 240 MG/1
240 CAPSULE, COATED, EXTENDED RELEASE ORAL DAILY
Qty: 90 CAPSULE | Refills: 0 | Status: SHIPPED | OUTPATIENT
Start: 2023-07-07 | End: 2023-10-31

## 2023-07-07 RX ORDER — SEMAGLUTIDE 1.34 MG/ML
0.25 INJECTION, SOLUTION SUBCUTANEOUS
Status: CANCELLED | OUTPATIENT
Start: 2023-07-07

## 2023-07-10 RX ORDER — SEMAGLUTIDE 0.68 MG/ML
0.25 INJECTION, SOLUTION SUBCUTANEOUS
Qty: 3 ML | Refills: 3 | Status: SHIPPED | OUTPATIENT
Start: 2023-07-10 | End: 2023-07-28

## 2023-07-28 ENCOUNTER — TELEPHONE (OUTPATIENT)
Dept: INTERNAL MEDICINE | Facility: CLINIC | Age: 68
End: 2023-07-28
Payer: COMMERCIAL

## 2023-07-28 DIAGNOSIS — R73.09 ABNORMAL GLUCOSE: ICD-10-CM

## 2023-07-28 DIAGNOSIS — E66.01 MORBID OBESITY (H): ICD-10-CM

## 2023-07-28 RX ORDER — SEMAGLUTIDE 0.68 MG/ML
0.25 INJECTION, SOLUTION SUBCUTANEOUS
Qty: 3 ML | Refills: 3 | Status: SHIPPED | OUTPATIENT
Start: 2023-07-28 | End: 2023-12-28

## 2023-07-28 NOTE — TELEPHONE ENCOUNTER
[facility] Call Center    Phone Message    May a detailed message be left on voicemail: yes     Reason for Call: Patient requesting a call back on status of semaglutide (OZEMPIC, 0.25 OR 0.5 MG/DOSE,) 2 MG/3ML pen, refill. Stated she will be out on 7/29/23 Please call her at [phone] to discuss further. Thank you    Action Taken: Message routed to:  [facility] pcc    Travel Screening: Not Applicable

## 2023-07-28 NOTE — TELEPHONE ENCOUNTER
RN called patient and she relayed that preferred pharmacy is the [facility] in [place].  RN sent Ozempic to preferred pharmacy.    Maggy Golden RN on 7/28/2023 at 3:03 PM

## 2023-08-26 DIAGNOSIS — K21.9 GASTROESOPHAGEAL REFLUX DISEASE WITHOUT ESOPHAGITIS: ICD-10-CM

## 2023-08-28 DIAGNOSIS — I10 ESSENTIAL HYPERTENSION, BENIGN: ICD-10-CM

## 2023-08-29 RX ORDER — PANTOPRAZOLE SODIUM 40 MG/1
40 TABLET, DELAYED RELEASE ORAL 2 TIMES DAILY
Qty: 180 TABLET | Refills: 0 | Status: SHIPPED | OUTPATIENT
Start: 2023-08-29 | End: 2023-11-16

## 2023-08-29 RX ORDER — IRBESARTAN 150 MG/1
150 TABLET ORAL AT BEDTIME
Qty: 90 TABLET | Refills: 0 | Status: SHIPPED | OUTPATIENT
Start: 2023-08-29 | End: 2023-09-28

## 2023-08-29 NOTE — TELEPHONE ENCOUNTER
irbesartan (AVAPRO) 150 MG tablet 90 tablet 2 11/19/2022     Last Office Visit: 8/3/22  Future Office visit:   9/13/23    Angiotensin-II Receptors [id number]/28/2023 02:11 PM   Protocol Details Last blood pressure under 140/90 in past 12 months    Recent (12 mo) or future (30 days) visit within the authorizing provider's specialty    Normal serum creatinine on file in past 12 months    Normal serum potassium on file in past 12 months     90 day kaleb refill sent to the pharmacy. Message sent to clinic for overdue labs.   Cinthia Fong RN

## 2023-08-29 NOTE — TELEPHONE ENCOUNTER
pantoprazole (PROTONIX) 40 MG EC tablet 180 tablet 0 7/3/2023     Last Office Visit: 8/3/22  Future Office visit:  9/13/23       90 day kaleb refill sent to the pharmacy. Has future appointment    Cinthia Fong RN

## 2023-09-13 ENCOUNTER — OFFICE VISIT (OUTPATIENT)
Dept: INTERNAL MEDICINE | Facility: CLINIC | Age: 68
End: 2023-09-13
Payer: COMMERCIAL

## 2023-09-13 VITALS
BODY MASS INDEX: 38.89 KG/M2 | HEIGHT: 63 IN | DIASTOLIC BLOOD PRESSURE: 76 MMHG | HEART RATE: 101 BPM | SYSTOLIC BLOOD PRESSURE: 118 MMHG | OXYGEN SATURATION: 96 % | WEIGHT: 219.5 LBS

## 2023-09-13 DIAGNOSIS — I10 ESSENTIAL HYPERTENSION, BENIGN: Primary | ICD-10-CM

## 2023-09-13 DIAGNOSIS — E66.812 CLASS 2 OBESITY DUE TO EXCESS CALORIES WITH BODY MASS INDEX (BMI) OF 36.0 TO 36.9 IN ADULT, UNSPECIFIED WHETHER SERIOUS COMORBIDITY PRESENT: ICD-10-CM

## 2023-09-13 DIAGNOSIS — Z01.818 PREOP GENERAL PHYSICAL EXAM: ICD-10-CM

## 2023-09-13 DIAGNOSIS — R73.03 PREDIABETES: ICD-10-CM

## 2023-09-13 DIAGNOSIS — Z00.00 ENCOUNTER FOR ANNUAL PHYSICAL EXAM: ICD-10-CM

## 2023-09-13 DIAGNOSIS — E66.09 CLASS 2 OBESITY DUE TO EXCESS CALORIES WITH BODY MASS INDEX (BMI) OF 36.0 TO 36.9 IN ADULT, UNSPECIFIED WHETHER SERIOUS COMORBIDITY PRESENT: ICD-10-CM

## 2023-09-13 PROCEDURE — 99397 PER PM REEVAL EST PAT 65+ YR: CPT | Mod: GC | Performed by: INTERNAL MEDICINE

## 2023-09-13 ASSESSMENT — ENCOUNTER SYMPTOMS
CHEST TIGHTNESS: 0
FEVER: 0
FATIGUE: 0
COUGH: 0
SHORTNESS OF BREATH: 0

## 2023-09-13 NOTE — PROGRESS NOTES
PRIMARY CARE CENTER  Pre-operative evaluation    Dara Lamb is a 68 year old woman with PMH of prediabetes, CA thyroid with postop hypothyroidism , CA cervical, and CA Breast, Cerebral aneurysm (dx 2008, Follow-up imaging yearly) who presents for a preoperative evaluation and annual wellness physical examination.  PCP: Shalonda Taylor     Subjective     PREOPERATIVE EVALUATION:  Today's date: 09/13/23    Surgical Information (as known today):  Surgeon: Dr Alex Franks  Fax number for Preop Evaluation: [phone]  Location of Surgery: [facility]  Date of Surgery: 9/20/23  Procedure: Colonoscopy  History of reaction to anesthesia? No      HPI related to upcoming procedure:    Has had mammogram done at [facility]: already scheduled another one for next year.   Is feeling well. Still gaining weight 3-4 lbs in 1 year despite ozempic.    A1c in two years: 6.2, BG last year 114, lipid profile LDL 55.    Will be checking thyroid with endocrinologist at [facility].    Has not been able to do exercise for back pain, is seeing orthopedics may have surgery.            9/13/2023     2:03 PM   Preop Questions   1. Have you ever had a heart attack or stroke? No   2. Have you ever had surgery on your heart or blood vessels, such as a stent placement, a coronary artery bypass, or surgery on an artery in your head, neck, heart, or legs? No   3. Do you have chest pain with activity? No   4. Do you have a history of  heart failure? No   5. Do you currently have a cold, bronchitis or symptoms of other infection? No   6. Do you have a cough, shortness of breath, or wheezing? No   7. Do you or anyone in your family have previous history of blood clots? YES - no personal history   8. Do you or does anyone in your family have a serious bleeding problem such as prolonged bleeding following surgeries or cuts? No   9. Have you ever had problems with anemia or been told to take iron  pills? YES    10. Have you had any abnormal blood loss such as black, tarry or bloody stools, or abnormal vaginal bleeding? No   11. Have you ever had a blood transfusion? No   12. Are you willing to have a blood transfusion if it is medically needed before, during, or after your surgery? Yes   13. Have you or any of your relatives ever had problems with anesthesia? No   14. Do you have sleep apnea, excessive snoring or daytime drowsiness? YES    14a. Do you have a CPAP machine? Yes   15. Do you have any artifical heart valves or other implanted medical devices like a pacemaker, defibrillator, or continuous glucose monitor? No   16. Do you have artificial joints? No   17. Are you allergic to latex? YES       Risk Assessment  Physical activity:   Can do 2 flights of stairs without SOB, no palpitation    Respiratory concerns:  No cough, no chest pain     Clotting concerns:  No personal history of abnormal bleeding/clotting, no hx of anticoagulant/antiplt    Preoperative Review of :   reviewed - no record of controlled substances prescribed.      Allergies   Allergen Reactions    Contrast Dye Shortness Of Breath    Dust Mite Extract Other (See Comments)     Sneezing    Dust Mites Other (See Comments)     Sneezing around dogs and cats    Hydrochlorothiazide     Iodinated Contrast Media     Latex     Latex     Lipitor [Atorvastatin Calcium]      myalgias    Oxycodone-Acetaminophen Nausea and Vomiting    Sulfa Antibiotics      arthralgias    Vicodin [Hydrocodone-Acetaminophen]      Nausea, vomiting    Wound Dressing Adhesive      PN: And type of tape with adhesive other than silk tape           Review of Systems   Constitutional:  Negative for fatigue and fever.   Respiratory:  Negative for cough, chest tightness and shortness of breath.    Cardiovascular:  Negative for chest pain and leg swelling.       As part of this encounter, I reviewed (and updated as able) the past medical, family, and social  "history.  (Please see past histories (etc) appended to this note at the bottom)    Objective     /76 (BP Location: Left arm, Patient Position: Sitting, Cuff Size: Adult Large)   Pulse 101   Ht 1.6 m (5' 2.99\")   Wt 99.6 kg (219 lb 8 oz)   SpO2 96%   BMI 38.89 kg/m      Physical Exam  Constitutional:       Appearance: Normal appearance.   Cardiovascular:      Rate and Rhythm: Normal rate and regular rhythm.      Pulses: Normal pulses.      Heart sounds: Normal heart sounds.   Pulmonary:      Effort: Pulmonary effort is normal.      Breath sounds: Normal breath sounds.   Abdominal:      General: Bowel sounds are normal.      Palpations: Abdomen is soft.   Neurological:      Mental Status: She is alert and oriented to person, place, and time.        No EKG required, no history of coronary heart disease, significant arrhythmia, peripheral arterial disease or other structural heart disease.      Assessment & Plan     Preoperative Assessment and recommendation    Appreciate the opportunity to be involved in this patient's care.    Revised Cardiac Risk Index (RCRI):  The patient has the following serious cardiovascular risks for perioperative complications:   - No serious cardiac risks = 0 points   RCRI Interpretation: 0 points: Class I (very low risk - 0.4% complication rate)    Procedure risk. My understanding is that the described procedure is considered LOW risk.         Overall, there are no apparent contraindications to planned colonoscopy procedure.    Otherwise, patient appears to be medically optimized for upcoming procedure, assuming appropriate medical supervision onsite for anesthesia or other developments.      Additional medical comments    Dara was seen today for physical, pre-op exam and refill request.    Diagnoses and all orders for this visit:    Essential hypertension, benign  -     Comprehensive metabolic panel (BMP + Alb, Alk Phos, ALT, AST, Total. Bili, TP); Future    Preop general " physical exam    Prediabetes  -     Hemoglobin A1c; Future    Encounter for annual physical exam  -     Lipid panel reflex to direct LDL Fasting; Future  -     Comprehensive metabolic panel (BMP + Alb, Alk Phos, ALT, AST, Total. Bili, TP); Future    Class 2 obesity due to excess calories with body mass index (BMI) of 36.0 to 36.9 in adult, unspecified whether serious comorbidity present  -     semaglutide (OZEMPIC) 2 MG/3ML pen; Inject 0.5 mg Subcutaneous every 7 days    Planning on titrating up the dosage of ozempic to achieve more weight loss.     Follow-up in 6 months with labs .     Patient was seen and plan of care was discussed with Dr. Shalonda Hemphill MD   09/13/23 2:30 PM   [facility] [place]   Clinic phone [phone]        Addendum: Past histories included in Epic as of this documentation      Patient Active Problem List   Diagnosis    Abnormal Pap smear and cervical HPV (human papillomavirus)    Breast neoplasm    Cerebral aneurysm, nonruptured    Migraine without aura    Malignant neoplasm of connective and other soft tissue    Diverticulitis of colon    Benign essential hypertension    Lumbago    Spinal stenosis, lumbar region, without neurogenic claudication    Asymptomatic menopausal state    Obstructive sleep apnea    Restless legs syndrome (RLS)    Primary thyroid papillary carcinoma (H)    Bipolar affective disorder (H)    Glaucoma    Class 2 obesity due to excess calories with body mass index (BMI) of 36.0 to 36.9 in adult, unspecified whether serious comorbidity present    Gastroesophageal reflux disease without esophagitis    Low back pain, unspecified back pain laterality, unspecified chronicity, with sciatica presence unspecified    Breast cancer in female (H)    Urge incontinence    Pelvic somatic dysfunction    Urinary urgency    Obesity with body mass index (BMI) of 30.0 to 39.9    Elevated LFTs    Anemia,  unspecified type    History of thyroidectomy    Atrophic vaginitis    Post-surgical hypothyroidism    Encounter for screening mammogram for breast cancer    Personal history of malignant neoplasm of breast    Personal history of malignant neoplasm of thyroid    Prediabetes    Mixed hyperlipidemia    Morbid obesity (H)       Past Medical History:   Diagnosis Date    Abnormal Pap smear and cervical HPV (human papillomavirus)     DARIO, conization 9/1990    Acne     Alcohol abuse     Bipolar affective disorder (H)     Breast cancer in female (H) 05/03/2011    invasive ductal ca, stage IIB, poorly differentiated,  ER and ME positive, lumpectomy,     Cerebral aneurysm, nonruptured     ophthalmic branch of left ICA. 2008.  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    Major depressive disorder     Major depressive disorder, recurrent episode, unspecified     Suicide attempt 10/2000    Menopause     2004    Migraine without aura, without mention of intractable migraine without mention of status migrainosus     Neoplasm of unspecified nature of breast     3.9 cm Stage 2B IDCa ER/ME +, HER2- 5/3/11right breast    Obstructive sleep apnea (adult) (pediatric)     Other and unspecified hyperlipidemia     Postmenopausal bleeding     2007 biopsy negative    Primary thyroid papillary carcinoma (H)     Rectal bleeding     [facility]    Restless legs syndrome (RLS)     Spinal stenosis, lumbar region, without neurogenic claudication     2003 L4-5    Thyroid cancer (H) 05/12/2012    Stage OMAR, papillary ca, s/p thyroidectomy and mod left neck dissection       Past Surgical History:    Procedure Laterality Date     SECTION      , 87, 97    COLECTOMY LEFT      sigmoid colon , diverticulitis, colostomy 2008 reanastomosis    COLONOSCOPY      2009    LAMINECTOMY LUMBAR ONE LEVEL      for cauda equina syndrome, 2006    LAPAROTOMY EXPLORATORY       for ?bowel obstx r/o pelvic infection    LUMPECTOMY BREAST      right breast 5/3/11    MAMMOPLASTY REDUCTION BILATERAL      11    THYROIDECTOMY      TONSILLECTOMY      10/26/03    WRIST SURGERY         Current Outpatient Medications   Medication Sig Dispense Refill    albuterol (VENTOLIN HFA) 108 (90 Base) MCG/ACT inhaler Inhale 1-2 puffs into the lungs every 4 hours as needed for shortness of breath / dyspnea or wheezing 18 g 10    atomoxetine (STRATTERA) 100 MG capsule Take 100 mg by mouth every morning      atomoxetine (STRATTERA) 60 MG capsule Take 60 mg by mouth At Bedtime      calcium carbonate (OS-RUBEN 500 MG [place]. [place]) 500 MG tablet Take 2 tablets by mouth daily. (Patient taking differently: Take 1,000 mg by mouth 2 times daily) 60 tablet 0    cyclobenzaprine (FLEXERIL) 10 MG tablet Take 1 tablet (10 mg) by mouth daily as needed for muscle spasms 90 tablet 3    diltiazem ER (DILT-XR) 240 MG 24 hr ER beaded capsule Take 1 capsule (240 mg) by mouth daily 90 capsule 3    diltiazem ER COATED BEADS (CARDIZEM CD/CARTIA XT) 240 MG 24 hr capsule Take 1 capsule (240 mg) by mouth daily 90 capsule 0    estradiol (ESTRACE) 0.1 MG/GM vaginal cream Place 2 g vaginally twice a week 42.5 g 3    eszopiclone (LUNESTA) 3 MG tablet TAKE 1 TABLET BY MOUTH AT BEDTIME AS NEEDED FOR SLEEP  2    fish oil-omega-3 fatty acids 1000 MG capsule Take 1 capsule by mouth      gabapentin (NEURONTIN) 300 MG capsule       gabapentin (NEURONTIN) 600 MG tablet Take 300 mg by mouth At Bedtime      ibuprofen (ADVIL/MOTRIN) 200 MG tablet Take 4 tablets (800 mg) by mouth daily as needed for moderate pain Do not take if you have stomach upset or gastrointestinal  bleeding. 180 tablet 3    irbesartan (AVAPRO) 150 MG tablet Take 1 tablet (150 mg) by mouth At Bedtime 90 tablet 0    latanoprost (XALATAN) 0.005 % ophthalmic solution Place 1 drop into both eyes At Bedtime      lurasidone (LATUDA) 60 MG TABS tablet Take by mouth At Bedtime      montelukast (SINGULAIR) 10 MG tablet Take 1 tablet (10 mg) by mouth At Bedtime *Due for office visit 8/2023, please call clinic to schedule* 90 tablet 0    multivitamin, therapeutic with minerals (THERA-VIT-M) TABS Take 1 tablet by mouth daily 90 each 3    OYSCO 500 + D 500-200 MG-UNIT tablet TAKE 2 TABLETS BY MOUTH TWICE A DAY      pantoprazole (PROTONIX) 40 MG EC tablet Take 1 tablet (40 mg) by mouth 2 times daily 180 tablet 0    polyethylene glycol (MIRALAX) powder Take 17 g by mouth daily 119 g 2    QUEtiapine (SEROQUEL XR) 50 MG TB24 24 hr tablet Take 50 mg by mouth At Bedtime Take 1.5 tablets daily.      rosuvastatin (CRESTOR) 40 MG tablet TAKE 1 TABLET BY MOUTH EVERY DAY 90 tablet 2    semaglutide (OZEMPIC, 0.25 OR 0.5 MG/DOSE,) 2 MG/1.5ML SOPN pen Inject 0.25 mg Subcutaneous every 7 days 1.5 mL 2    semaglutide (OZEMPIC, 0.25 OR 0.5 MG/DOSE,) 2 MG/3ML pen Inject 0.25 mg Subcutaneous every 7 days 3 mL 3    solifenacin (VESICARE) 5 MG tablet Take 1 tablet (5 mg) by mouth daily * SCHEDULE CLINIC APPT.* 30 tablet 0    SYNTHROID 150 MCG tablet Take 150 mcg by mouth daily      timolol maleate (TIMOPTIC) 0.5 % ophthalmic solution PLACE 1 DROP INTO LEFT EYE ONCE DAILY.      vitamin  B complex with vitamin C (STRESS TAB) TABS Take 1 tablet by mouth daily 90 tablet 1    vortioxetine (TRINTELLIX) 20 MG tablet 20 mg         Family History   Problem Relation Age of Onset    Hypertension Mother     Breast Cancer Mother         age 80's    Heart Disease Mother     Prostate Cancer Father     Alcohol/Drug Father     Lipids Father     Cerebrovascular Disease Father         incidental finding on MRI    Cancer Father     Psychotic Disorder Sister          depression    Asthma Sister     Cancer Sister     Alcohol/Drug Brother     BOOKERAISAEL. Brother         2 MI's with stents    Psychotic Disorder Brother         depression    Heart Disease Brother         Aortic and mitral valve replacement    Esophageal Cancer Brother         diet age 61    Blood Disease Maternal Grandmother         DVT, PE    Cancer Maternal Grandmother     Psychotic Disorder Son         autism    Asthma Son     Psychotic Disorder Other         ADD (3 children)    Lung Cancer No family hx of        Social History     Social History Narrative    .  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  Has significant (Bari). Three children all with ADD.  Son with autism (lives with her).  Daughter, age 21, graduating from [employer] this year. No tobacco use.  Previous excessive alcohol use, now in remission.    2020       Tobacco Use    Smoking status: Never    Smokeless tobacco: Never   Substance and Sexual Activity    Alcohol use: No    Drug use: No

## 2023-09-13 NOTE — PROGRESS NOTES
Medicare Annual Wellness Questionnaire:  This 68 year old year old female presents for a Medicare Wellness Exam.    Patient Care Team         Relationship Specialty Notifications Start End    Shalonda Taylor MD PCP - General Internal Medicine  8/5/22     Phone: [phone] Pager: [phone] Fax: [phone] [address] [address]    Bridget Prescott MD MD Urology  10/9/17     Phone: [phone] Fax: [phone]         [address] [place] [address]    Daisy Brown RN Registered Nurse Urology  10/9/17     Phone: [phone] Pager: [phone]        Anastasiya Dasilva MD Resident Student in organized health care education/training program  2/13/20     Phone: [phone] Fax: [phone]         [facility] [address] [facility] [address]    Shalonda Taylor MD Assigned PCP   8/6/22     Phone: [phone] Pager: [phone] Fax: [phone]        [address] [address]            Fall Risk Assessment:  Have you fallen 2 or more times in the last year?  No    How many times were you injured due to a fall in the last year?  0    PHQ-2:  Over the last 2 weeks, how often have you been bothered by feeling down, depressed, or hopeless?   Nearly every day (3)    Over the last 2 weeks, how often have you had little interest or pleasure in doing things?   More than half the days (2)     Social History:  What is your marital status?      Who lives in your household?  partner    Does your home have loose rugs in the hallway:     Yes     Does your home have grab bars in the bathroom:    No    Does your home have handrails on the stairs?  Yes     Does your home have poorly lit areas?    No    Do you feel threatened or controlled by a partner, ex-partner or anyone in your life?   No    Has anyone hurt you physically, for example by pushing, hitting, slapping or kicking you or  forcing you to have sex?   No    Do you need help with the phone, transportation, shopping, preparing meals, housework, laundry, medications or managing money?   Yes     Sexual Health:  Are you sexually active?    Yes     If yes, with men, women, or both?   Men     If yes, how many partners?  1    If yes, are you using condoms?    No    Have you had any sexually transmitted infections in the last year?   No    Do you have any sexual concerns?    Yes , vaginal tightness / dryness. I see OBGYN for this    Women Only:  Women: What year did you stop having periods (approximate age)?  2002    Women: Any vaginal bleeding in the last year?    No    Women: Have you ever had an abnormal Pap smear?    Yes  1990, had cervical cancer    General Health Assessment:  Have you noticed any hearing difficulties?   No    Do you wear hearing aids?   No    Have you seen a hearing professional such as an audiologist in the last 1 year?   No    Do you have vision difficulty?    Yes     Do you wear glasses or contacts?   Yes     Have you seen an eye doctor in the last 1 year?   Yes     How many servings of fruits and vegetables do you eat a day?  Fruit: < 1 cup  Vegetables: < 1 cup    How often do you exercise in a week?  0 rarely    How long and what kind of exercise do you do?  walking    Tobacco and Alcohol History:  Do you use tobacco/nicotine products?    No    If yes, please list the method of use and average weekly consumption?  N/A    Do you use any other drugs?   No         Do you drink alcohol?   Yes     If you drink alcohol, how many drinks per week?  1 drink per day    Advance Directive:  Have you completed an Advance Directives document?  No    If yes, have you given a copy to the clinic?   No    Do you need information on Advance Directives?   No    Jori Gonzalez, EMT at 3:39 PM on 9/13/2023

## 2023-09-13 NOTE — PROGRESS NOTES
Dara Lamb is a pleasant 68 year old year old female coming in today for annual physical.                 There were no vitals taken for this visit.    PHYSICAL EXAM    Constitutional: no distress, comfortable, pleasant   Eyes: anicteric, normal extra-ocular movements   Ears, Nose and Throat: tympanic membranes clear, neck supple with full range of motion, no thyromegaly.   Cardiovascular: regular rate and rhythm, normal S1 and S2, no murmurs, rubs or gallops, peripheral pulses full and symmetric   Respiratory: clear to auscultation, no wheezes or crackles, normal breath sounds   Gastrointestinal: positive bowel sounds, nontender, no hepatosplenomegaly, no masses   Musculoskeletal: knees full range of motion, no effusion  Skin: no concerning lesions, no jaundice   Neurological:  normal gait, no tremor   Psychological: appropriate mood   Lymphatic: no cervical lymphadenopathy and no pedal edema      ASSESSMENT & PLAN    There are no diagnoses linked to this encounter.

## 2023-09-13 NOTE — PROGRESS NOTES
Surgeon: Dr Alex Franks  Fax number for Preop Evaluation: [phone]  Location of Surgery: [facility]  Date of Surgery: 9/20/23  Procedure: Colonoscopy  History of reaction to anesthesia? No    Jori Gonzalez, EMT at 2:09 PM on 9/13/2023.

## 2023-09-13 NOTE — NURSING NOTE
"Dara Lamb is a 68 year old female patient that presents today in clinic for the following:    Chief Complaint   Patient presents with    Physical    Pre-Op Exam     DOS 9/20/2023 for Colonoscopy with Dr Alex Franks at [facility] fax # [phone]    Refill Request     Pt needing refill for Irbesartan      The patient's allergies and medications were reviewed as noted. A set of vitals were recorded as noted without incident: /76 (BP Location: Left arm, Patient Position: Sitting, Cuff Size: Adult Large)   Pulse 101   Ht 1.6 m (5' 2.99\")   Wt 99.6 kg (219 lb 8 oz)   SpO2 96%   BMI 38.89 kg/m  . The patient does not have any other questions for the provider.    Jori Gonzalez, EMT 2:11 PM on 9/13/2023   "

## 2023-09-13 NOTE — PATIENT INSTRUCTIONS
To schedule a lab appointment at the [facility], please call [phone].     To schedule an appointment with Dr. Mcintyre, call [phone].

## 2023-09-15 ENCOUNTER — VIRTUAL VISIT (OUTPATIENT)
Dept: FAMILY MEDICINE | Facility: CLINIC | Age: 68
End: 2023-09-15
Payer: COMMERCIAL

## 2023-09-15 DIAGNOSIS — F31.9 BIPOLAR AFFECTIVE DISORDER, REMISSION STATUS UNSPECIFIED (H): ICD-10-CM

## 2023-09-15 DIAGNOSIS — E66.01 MORBID OBESITY (H): ICD-10-CM

## 2023-09-15 DIAGNOSIS — U07.1 INFECTION DUE TO 2019 NOVEL CORONAVIRUS: Primary | ICD-10-CM

## 2023-09-15 DIAGNOSIS — I20.1 CORONARY VASOSPASM (H): ICD-10-CM

## 2023-09-15 PROCEDURE — 99213 OFFICE O/P EST LOW 20 MIN: CPT | Mod: VID | Performed by: PHYSICIAN ASSISTANT

## 2023-09-15 NOTE — PATIENT INSTRUCTIONS
COVID-19 Outpatient Treatments  Your care team can help you find the best treatments for COVID-19. Talk to a health care provider or refer to the FDA medicine fact sheets below.  Important: You can't have Paxlovid or molnupiravir if you're starting the medicine more than 5 days after your symptoms have started.  Paxlovid: https://www.fda.gov/media/756002/download  Molnupiravir (Lagevrio): https://www.fda.gov/media/593539/download  Paxlovid (nimatrelvir and ritonavir)  How it works  Two medicines (nirmatrelvir and ritonavir) are taken together. They stop the virus from growing. Less amount of virus is easier for your body to fight.  Benefits  Lowers risk of a hospital stay or death from COVID-19.  How to take  Medicine comes in a daily container with both medicine tablets. Take by mouth twice daily (once in the morning, once at night) for 5 days.  The number of tablets to take varies by patient.  Don't chew or break capsules. Swallow whole.  When to take  Take as soon as possible after positive COVID-19 test result, and within 5 days of your first symptoms.  Who can take it  Patients must be 12 years or older, weigh at least 88 pounds, and have tested positive for COVID-19. Paxlovid is the preferred treatment for pregnant patients.  Possible side effects  Can cause altered sense of taste, diarrhea (loose, watery stools), high blood pressure, muscle aches.  Medicine conflicts  Some medicines may conflict with Paxlovid and may cause serious side effects.  Tell your care team about all the medicines you take, including prescription and over-the-counter medicines, vitamins, and herbal supplements.  Your care team will review your medicines to make sure that you can safely take Paxlovid.  Cautions  Paxlovid is not advised for patients with severe kidney or liver disease. If you have kidney or liver problems, the dose may need to be changed.  If you're pregnant or breastfeeding, talk to your care team about your  options.  If you take hormonal birth control (such as the Pill), then you or your partner should also use a non-hormonal form of birth control (such as a condom). Keep doing this for 1 menstrual cycle after your last dose of Paxlovid.  Molnupiravir (Lagevrio)  How it works  Stops the virus from growing. Less amount of virus is easier for your body to fight.  Benefits  Lowers risk of a hospital stay or death from COVID-19.  How to take  Take 4 capsules by mouth every 12 hours (4 in the morning and 4 at night) for 5 days. Don't chew or break capsules. Swallow whole.  When to take  Take as soon as possible after positive COVID-19 test result, and within 5 days of your first symptoms.  Who can take it  Patients must be 18 years or older and have tested positive for COVID-19.  Possible side effects  Diarrhea (loose, watery stools), nausea (feeling sick to your stomach), dizziness, headaches.  Medicine conflicts  Right now, there are no known conflicts with other drugs. But tell your care team about all medicines you take.  Cautions  This medicine is not advised for patients who are pregnant.  If you are someone who could become pregnant, use trusted birth control until 4 days after your last dose of molnupiravir.  If your partner could become pregnant, you should use trusted birth control until 3 months after your last dose of molnupiravir.  For informational purposes only. Not to replace the advice of your health care provider. Copyright   2022 [facility]. All rights reserved. Clinically reviewed by Elida Grayson, PharmD, BCACP. Vyykn [id number] - REV 12/22.    Instructions for Patients      What are the symptoms of COVID-19?  Symptoms can include fever, cough, shortness of breath, chills, headache, muscle pain sore throat, fatigue, runny or stuffy nose, and loss of taste and smell. Other less common symptoms include nausea, vomiting, or diarrhea (watery stools).    Know when to call 911. Emergency  warning signs include:  Trouble breathing or shortness of breath  Pain or pressure in the chest that doesn't go away  Feeling confused like you haven't felt before, or not being able to wake up  Bluish-colored lips or face    How can I take care of myself?  Get lots of rest. Drink extra fluids (unless a doctor has told you not to).  Take Tylenol (acetaminophen) for fever or pain. If you have liver or kidney problems, ask your family doctor if it's okay to take Tylenol   Adults can take either:   650 mg (two 325 mg pills or tablets) every 4 to 6 hours, or...   1,000 mg (two 500 mg pills) every 8 hours as needed.  Note: Don't take more than 3,000 mg in one day. Acetaminophen is found in many medicines (both prescribed and over the counter). Read all labels to be sure you don't take too much.  For children, check the Tylenol bottle for the right dose. The dose is based on the child's age or weight.  Take over the counter medicines for your symptoms as needed. Talk to your pharmacist.  If you have other health problems (like cancer, heart failure, an organ transplant, or severe kidney disease): Call your specialty clinic if you don't feel better in the next 2 days.    Where can I get more information?  [facility] COVID-19 Resource Hub: www.[employer].org/covid19/   CDC Quarantine & Isolation: https://www.cdc.gov/coronavirus/2019-ncov/your-health/quarantine-isolation.html   CDC - What to Do If You're Sick: https://www.cdc.gov/coronavirus/2019-ncov/if-you-are-sick/index.html  Learn about the ACTIV-6 Clinical Trial: activ6.[facility] or call (924)-[phone]

## 2023-09-15 NOTE — PROGRESS NOTES
Dara is a 68 year old who is being evaluated via a billable video visit.      How would you like to obtain your AVS? MyChart  If the video visit is dropped, the invitation should be resent by: Text to cell phone: [phone]  Will anyone else be joining your video visit? No          Assessment & Plan     Infection due to 2019 novel coronavirus  Present on home testing. Based on history and visual  exam, mild to moderate symptoms. No need for ER visit.  If severe  chest pains  or shortness of breath develop, report to ER. Of note, latuda use prevents use of paxlovid. Patient does not qualify per [facility] protocol for remdesivir though this would be the recommended next option. Molnupiravir has very limited evidence of aid and  recent research shows no improvement in hospitalization or death outcomes though it does should  improved symptoms possibly. Based on this and  limited option, will prescribe. If worsening symptoms to call. If no improvement in 1 week, to see in office.   - molnupiravir (LAGEVRIO) 200 MG capsule; Take 4 capsules (800 mg) by mouth every 12 hours for 5 days  Medication use and side effects discussed with the patient. Patient is in complete understanding and agreement with plan.          Vladimir Olsen PA-C  [facility]   Dara is a 68 year old, presenting for the following health issues:  Covid Concern        9/15/2023    10:17 AM   Additional Questions   Roomed by Wale MOON   Accompanied by No one         9/15/2023    10:17 AM   Patient Reported Additional Medications   Patient reports taking the following new medications None       HPI       COVID-19 Symptom Review  How many days ago did these symptoms start? 9/13/23    Are any of the following symptoms significant for you?  New or worsening difficulty breathing? Yes  Please describe what kind of difficulty you are having breathing:Mild dyspnea (able to do ADLs without difficulty, mild  shortness of breath with activities such as climbing one or two flights of stairs or walking briskly)  Worsening cough? Yes, I am coughing up mucus.  Fever or chills? Yes, I felt feverish or had chills.  Headache: YES- slight one   Sore throat: YES  Chest pain: Burning pain in chest  Diarrhea: No  Body aches? Body aches all over    What treatments has patient tried? Mucinex, tylenol, ibuprofen, throat lozenges   Does patient live in a nursing home, group home, or shelter? No  Does patient have a way to get food/medications during quarantined? Yes, I have a friend or family member who can help me.              Review of Systems   Constitutional, HEENT, cardiovascular, pulmonary, GI, , musculoskeletal, neuro, skin, endocrine and psych systems are negative, except as otherwise noted.      Objective           Vitals:  No vitals were obtained today due to virtual visit.    Physical Exam   GENERAL: healthy, alert, and no distress  EYES: Eyes grossly normal to inspection.  No discharge or erythema, or obvious scleral/conjunctival abnormalities.  RESP: Hoarse voice. Otherwise, No audible wheeze, cough, or visible cyanosis.  No visible retractions or increased work of breathing.    SKIN: Visible skin clear. No significant rash, abnormal pigmentation or lesions.  NEURO: Cranial nerves grossly intact.  Mentation and speech appropriate for age.  PSYCH: Mentation appears normal, affect normal/bright, judgement and insight intact, normal speech and appearance well-groomed.      Video-Visit Details    Type of service:  Video Visit     Originating Location (pt. Location): Home    Distant Location (provider location):  Off-site  Platform used for Video Visit: Emily

## 2023-09-18 ENCOUNTER — TELEPHONE (OUTPATIENT)
Dept: INTERNAL MEDICINE | Facility: CLINIC | Age: 68
End: 2023-09-18

## 2023-09-18 NOTE — TELEPHONE ENCOUNTER
[facility] Call Center    Phone Message    May a detailed message be left on voicemail: yes     Reason for Call: Other: Patient is having surgery in 2 days and they need the pr op signed ASAP,please call if questions.        Action Taken: Message routed to:  [facility] ([facility] P[facility]    Travel Screening: Not Applicable

## 2023-09-20 ENCOUNTER — MYC MEDICAL ADVICE (OUTPATIENT)
Dept: PHARMACY | Facility: CLINIC | Age: 68
End: 2023-09-20
Payer: COMMERCIAL

## 2023-09-20 NOTE — TELEPHONE ENCOUNTER
MTM referral from: Patient's insurance      MTM referral outreach attempt #1 on September 20, 2023      Outcome:  message    Tracie Watson PharmD  Medication Therapy Management Pharmacist  [facility]

## 2023-09-26 DIAGNOSIS — I10 ESSENTIAL HYPERTENSION, BENIGN: ICD-10-CM

## 2023-09-26 DIAGNOSIS — E78.2 MIXED HYPERLIPIDEMIA: ICD-10-CM

## 2023-09-28 RX ORDER — ROSUVASTATIN CALCIUM 40 MG/1
40 TABLET, COATED ORAL DAILY
Qty: 90 TABLET | Refills: 0 | Status: SHIPPED | OUTPATIENT
Start: 2023-09-28 | End: 2024-01-15

## 2023-09-28 RX ORDER — IRBESARTAN 150 MG/1
150 TABLET ORAL AT BEDTIME
Qty: 90 TABLET | Refills: 0 | Status: SHIPPED | OUTPATIENT
Start: 2023-09-28 | End: 2024-01-15

## 2023-09-28 NOTE — TELEPHONE ENCOUNTER
rosuvastatin (CRESTOR) 40 MG tablet       Last Written Prescription Date:  12/30/2022   Last Fill Quantity: 90,   # refills: 2  Last Office Visit : 9/13/23  Future Office visit:  none  Routing refill request to provider for review/approval because:  LDL overdue.  90 day kaleb refill sent to the pharmacy - including instructions for patient to complete ordered labs.   LDL Cholesterol Calculated   Date Value Ref Range Status   08/03/2022 55 <=100 mg/dL Final      Warnings Override History for rosuvastatin (CRESTOR) 40 MG tablet [[id number]]    Overridden by Antione Stewart RN on Dec 30, 2022 12:41 PM   Allergy/Contraindication   1. ATORVASTATIN CALCIUM [Level: Drug Class Match] [Reason: Tolerated medication/side effects in past]         irbesartan (AVAPRO) 150 MG tablet       Last Written Prescription Date:  8/29/2023   Last Fill Quantity: 90,   # refills: 0  Last Office Visit : 9/13/23  Future Office visit:  none  Routing refill request to provider for review/approval because:  Cr, K+ overdue. 90 day kaleb refill sent to the pharmacy - including instructions for patient to complete labs ordered.     Angiotensin-II Receptors Failed  Lab Test 08/03/22  1206   CR 0.56      Recent Labs   Lab Test 08/03/22  1206   POTASSIUM 5.0          BP Readings from Last 3 Encounters:   09/13/23 118/76   08/03/22 115/79   01/04/22 116/80

## 2023-10-06 ENCOUNTER — TELEPHONE (OUTPATIENT)
Dept: PHARMACY | Facility: OTHER | Age: 68
End: 2023-10-06
Payer: COMMERCIAL

## 2023-10-06 NOTE — TELEPHONE ENCOUNTER
Patient was referred for an MTM appointment by their insurance plan.  Calling patient and left a voicemail to see if they had time to review their medications today, or if we could schedule an appointment.  Appointment would be a general review of their medications to make sure they are working well, don't have any serious interactions/aren't causing side effects, and if there are ways to simplify them, make them easier to take, or more affordable.  Patient can call [phone] to schedule an appointment with me, or another MTM pharmacist.    Conchita Dorantes, Pharm.D.  Medication Therapy Management Pharmacist  [facility]

## 2023-10-27 NOTE — TELEPHONE ENCOUNTER
Called to offer an MTM appointment. No answer, LVM with information to schedule if interested at [phone].      Terra Mireles, PharmD, [facility]  Medication Therapy Management Pharmacist   October 27, 2023

## 2023-10-29 DIAGNOSIS — I10 ESSENTIAL HYPERTENSION, BENIGN: ICD-10-CM

## 2023-10-31 RX ORDER — DILTIAZEM HYDROCHLORIDE 240 MG/1
240 CAPSULE, COATED, EXTENDED RELEASE ORAL DAILY
Qty: 90 CAPSULE | Refills: 0 | Status: SHIPPED | OUTPATIENT
Start: 2023-10-31 | End: 2024-01-15

## 2023-10-31 NOTE — TELEPHONE ENCOUNTER
diltiazem ER COATED BEADS (CARDIZEM CD/CARTIA XT) 240 MG 24 hr capsule 90 capsule 0 7/7/2023  Last Office Visit : 9/13/2023   Future Office visit:  0    Routing refill request to provider for review/approval because:  ALT, Cr overdue. 90 days kaleb refill given.          Recent Labs   Lab Test 08/03/22  1206   ALT 29           Recent Labs   Lab Test 08/03/22  1206   CR 0.56

## 2023-11-10 ENCOUNTER — TRANSFERRED RECORDS (OUTPATIENT)
Dept: HEALTH INFORMATION MANAGEMENT | Facility: CLINIC | Age: 68
End: 2023-11-10
Payer: COMMERCIAL

## 2023-11-14 DIAGNOSIS — I10 ESSENTIAL HYPERTENSION, BENIGN: ICD-10-CM

## 2023-11-14 DIAGNOSIS — K21.9 GASTROESOPHAGEAL REFLUX DISEASE WITHOUT ESOPHAGITIS: ICD-10-CM

## 2023-11-16 RX ORDER — DILTIAZEM HYDROCHLORIDE 240 MG/1
240 CAPSULE, COATED, EXTENDED RELEASE ORAL DAILY
Qty: 90 CAPSULE | Refills: 0 | OUTPATIENT
Start: 2023-11-16

## 2023-11-16 RX ORDER — PANTOPRAZOLE SODIUM 40 MG/1
40 TABLET, DELAYED RELEASE ORAL 2 TIMES DAILY
Qty: 180 TABLET | Refills: 2 | Status: SHIPPED | OUTPATIENT
Start: 2023-11-16 | End: 2024-08-27

## 2023-11-16 NOTE — TELEPHONE ENCOUNTER
pantoprazole (PROTONIX) 40 MG EC tablet  180 tablet 0 8/29/2023    Last Office Visit : 9/13/2023   Future Office visit:  0

## 2023-12-17 DIAGNOSIS — E66.01 MORBID OBESITY (H): ICD-10-CM

## 2023-12-17 DIAGNOSIS — R73.09 ABNORMAL GLUCOSE: ICD-10-CM

## 2023-12-20 RX ORDER — SEMAGLUTIDE 0.68 MG/ML
INJECTION, SOLUTION SUBCUTANEOUS
Qty: 3 ML | Refills: 3 | OUTPATIENT
Start: 2023-12-20

## 2023-12-20 NOTE — TELEPHONE ENCOUNTER
semaglutide (OZEMPIC) 2 MG/3ML pen 3 mL 11 9/13/2023     Last Office Visit : 9/13/2023  [facility] [place]     Shalonda Taylor MD     Future Office visit:  0    Routing refill request to provider for review/approval because:  Request refused should have refills on file  A1C not completed since 2020.   Cr overdue.    Creatinine   Date Value Ref Range Status   08/03/2022 0.56 0.52 - 1.04 mg/dL Final   07/29/2020 0.64 0.52 - 1.04 mg/dL Final     Hemoglobin A1C   Date Value Ref Range Status   07/29/2020 6.0 (H) 0 - 5.6 % Final     Comment:     Normal <5.7% Prediabetes 5.7-6.4%  Diabetes 6.5% or higher - adopted from ADA   consensus guidelines.

## 2023-12-21 ENCOUNTER — OFFICE VISIT (OUTPATIENT)
Dept: FAMILY MEDICINE | Facility: CLINIC | Age: 68
End: 2023-12-21
Payer: COMMERCIAL

## 2023-12-21 VITALS
WEIGHT: 219.5 LBS | SYSTOLIC BLOOD PRESSURE: 109 MMHG | TEMPERATURE: 98.3 F | OXYGEN SATURATION: 97 % | DIASTOLIC BLOOD PRESSURE: 71 MMHG | HEART RATE: 100 BPM | RESPIRATION RATE: 20 BRPM | HEIGHT: 62 IN | BODY MASS INDEX: 40.39 KG/M2

## 2023-12-21 DIAGNOSIS — K21.9 GASTROESOPHAGEAL REFLUX DISEASE WITHOUT ESOPHAGITIS: ICD-10-CM

## 2023-12-21 DIAGNOSIS — R73.03 PREDIABETES: ICD-10-CM

## 2023-12-21 DIAGNOSIS — I10 BENIGN ESSENTIAL HYPERTENSION: ICD-10-CM

## 2023-12-21 DIAGNOSIS — M48.061 SPINAL STENOSIS, LUMBAR REGION, WITHOUT NEUROGENIC CLAUDICATION: ICD-10-CM

## 2023-12-21 DIAGNOSIS — E66.813 CLASS 3 SEVERE OBESITY DUE TO EXCESS CALORIES WITH SERIOUS COMORBIDITY AND BODY MASS INDEX (BMI) OF 40.0 TO 44.9 IN ADULT (H): ICD-10-CM

## 2023-12-21 DIAGNOSIS — N30.00 ACUTE CYSTITIS WITHOUT HEMATURIA: ICD-10-CM

## 2023-12-21 DIAGNOSIS — R30.0 DYSURIA: ICD-10-CM

## 2023-12-21 DIAGNOSIS — I20.1 CORONARY VASOSPASM (H): ICD-10-CM

## 2023-12-21 DIAGNOSIS — G47.33 OBSTRUCTIVE SLEEP APNEA: ICD-10-CM

## 2023-12-21 DIAGNOSIS — Z17.0 MALIGNANT NEOPLASM OF UPPER-INNER QUADRANT OF RIGHT BREAST IN FEMALE, ESTROGEN RECEPTOR POSITIVE (H): ICD-10-CM

## 2023-12-21 DIAGNOSIS — C50.211 MALIGNANT NEOPLASM OF UPPER-INNER QUADRANT OF RIGHT BREAST IN FEMALE, ESTROGEN RECEPTOR POSITIVE (H): ICD-10-CM

## 2023-12-21 DIAGNOSIS — F31.9 BIPOLAR AFFECTIVE DISORDER, REMISSION STATUS UNSPECIFIED (H): ICD-10-CM

## 2023-12-21 DIAGNOSIS — Z01.818 PREOP GENERAL PHYSICAL EXAM: Primary | ICD-10-CM

## 2023-12-21 DIAGNOSIS — E66.01 CLASS 3 SEVERE OBESITY DUE TO EXCESS CALORIES WITH SERIOUS COMORBIDITY AND BODY MASS INDEX (BMI) OF 40.0 TO 44.9 IN ADULT (H): ICD-10-CM

## 2023-12-21 LAB
ALBUMIN UR-MCNC: 30 MG/DL
ANION GAP SERPL CALCULATED.3IONS-SCNC: 10 MMOL/L (ref 7–15)
APPEARANCE UR: ABNORMAL
BACTERIA #/AREA URNS HPF: ABNORMAL /HPF
BILIRUB UR QL STRIP: NEGATIVE
BUN SERPL-MCNC: 10.4 MG/DL (ref 8–23)
CALCIUM SERPL-MCNC: 9.1 MG/DL (ref 8.8–10.2)
CHLORIDE SERPL-SCNC: 104 MMOL/L (ref 98–107)
COLOR UR AUTO: YELLOW
CREAT SERPL-MCNC: 0.79 MG/DL (ref 0.51–0.95)
DEPRECATED HCO3 PLAS-SCNC: 27 MMOL/L (ref 22–29)
EGFRCR SERPLBLD CKD-EPI 2021: 81 ML/MIN/1.73M2
ERYTHROCYTE [DISTWIDTH] IN BLOOD BY AUTOMATED COUNT: 14.3 % (ref 10–15)
GLUCOSE SERPL-MCNC: 96 MG/DL (ref 70–99)
GLUCOSE UR STRIP-MCNC: NEGATIVE MG/DL
HBA1C MFR BLD: 5.8 % (ref 0–5.6)
HCT VFR BLD AUTO: 38.4 % (ref 35–47)
HGB BLD-MCNC: 12.4 G/DL (ref 11.7–15.7)
HGB UR QL STRIP: ABNORMAL
KETONES UR STRIP-MCNC: ABNORMAL MG/DL
LEUKOCYTE ESTERASE UR QL STRIP: ABNORMAL
MCH RBC QN AUTO: 30 PG (ref 26.5–33)
MCHC RBC AUTO-ENTMCNC: 32.3 G/DL (ref 31.5–36.5)
MCV RBC AUTO: 93 FL (ref 78–100)
NITRATE UR QL: POSITIVE
PH UR STRIP: 6 [PH] (ref 5–7)
PLATELET # BLD AUTO: 233 10E3/UL (ref 150–450)
POTASSIUM SERPL-SCNC: 4.1 MMOL/L (ref 3.4–5.3)
RBC # BLD AUTO: 4.14 10E6/UL (ref 3.8–5.2)
RBC #/AREA URNS AUTO: ABNORMAL /HPF
SODIUM SERPL-SCNC: 141 MMOL/L (ref 135–145)
SP GR UR STRIP: 1.02 (ref 1–1.03)
SQUAMOUS #/AREA URNS AUTO: ABNORMAL /LPF
UROBILINOGEN UR STRIP-ACNC: 0.2 E.U./DL
WBC # BLD AUTO: 5.9 10E3/UL (ref 4–11)
WBC #/AREA URNS AUTO: >100 /HPF
WBC CLUMPS #/AREA URNS HPF: PRESENT /HPF

## 2023-12-21 PROCEDURE — 81001 URINALYSIS AUTO W/SCOPE: CPT | Performed by: NURSE PRACTITIONER

## 2023-12-21 PROCEDURE — 93000 ELECTROCARDIOGRAM COMPLETE: CPT | Performed by: NURSE PRACTITIONER

## 2023-12-21 PROCEDURE — 83036 HEMOGLOBIN GLYCOSYLATED A1C: CPT | Performed by: NURSE PRACTITIONER

## 2023-12-21 PROCEDURE — 87186 SC STD MICRODIL/AGAR DIL: CPT | Mod: 59 | Performed by: NURSE PRACTITIONER

## 2023-12-21 PROCEDURE — 87088 URINE BACTERIA CULTURE: CPT | Performed by: NURSE PRACTITIONER

## 2023-12-21 PROCEDURE — 36415 COLL VENOUS BLD VENIPUNCTURE: CPT | Performed by: NURSE PRACTITIONER

## 2023-12-21 PROCEDURE — 80048 BASIC METABOLIC PNL TOTAL CA: CPT | Performed by: NURSE PRACTITIONER

## 2023-12-21 PROCEDURE — 87086 URINE CULTURE/COLONY COUNT: CPT | Performed by: NURSE PRACTITIONER

## 2023-12-21 PROCEDURE — 99214 OFFICE O/P EST MOD 30 MIN: CPT | Mod: 25 | Performed by: NURSE PRACTITIONER

## 2023-12-21 PROCEDURE — 85027 COMPLETE CBC AUTOMATED: CPT | Performed by: NURSE PRACTITIONER

## 2023-12-21 RX ORDER — NITROFURANTOIN 25; 75 MG/1; MG/1
100 CAPSULE ORAL 2 TIMES DAILY
Qty: 14 CAPSULE | Refills: 0 | Status: SHIPPED | OUTPATIENT
Start: 2023-12-21 | End: 2023-12-28

## 2023-12-21 ASSESSMENT — PAIN SCALES - GENERAL: PAINLEVEL: NO PAIN (0)

## 2023-12-21 NOTE — PATIENT INSTRUCTIONS
Before your surgery:  14 days before: stop all over the counter supplements  1 week before: stop aspirin if you are taking aspirin.   stop ibuprofen, naproxen or similar antiinflammatory medications. You may use Tylenol for pain or headache.   On the day of your surgery:  Additional Medication Instructions:  Patient is to take all scheduled medications on the day of surgery EXCEPT for modifications listed below:   - ACE/ARB: HOLD on day of surgery (minimum 11 hours for general anesthesia).Iversartan   - Calcium Channel Blockers: May be continued on the day of surgery.diltiazem   - Statins: Continue taking on the day of surgery.    - GLP-1 Injectable (exenitide, liraglutide, semaglutide, dulaglutide, etc.): HOLD 7 days before surgery    - pregabalin, gabapentin: Continue without modification.   - ibuprofen (Advil, Motrin): HOLD 7 day before surgery.    - SSRIs, SNRIs, TCAs, Antipsychotics: Continue without modification.   After surgery:    You may resume all your medications after the surgery unless your surgeon instructs you otherwise  Will follow up and/or notify patient of  results via My Chart to determine further need for followup      Preparing for Your Surgery  Getting started  A nurse will call you to review your health history and instructions. They will give you an arrival time based on your scheduled surgery time. Please be ready to share:  Your doctor's clinic name and phone number  Your medical, surgical, and anesthesia history  A list of allergies and sensitivities  A list of medicines, including herbal treatments and over-the-counter drugs  Whether the patient has a legal guardian (ask how to send us the papers in advance)  Please tell us if you're pregnant--or if there's any chance you might be pregnant. Some surgeries may injure a fetus (unborn baby), so they require a pregnancy test. Surgeries that are safe for a fetus don't always need a test, and you can choose whether to have one.   If you have a  child who's having surgery, please ask for a copy of Preparing for Your Child's Surgery.    Preparing for surgery  Within 10 to 30 days of surgery: Have a pre-op exam (sometimes called an H&P, or History and Physical). This can be done at a clinic or pre-operative center.  If you're having a , you may not need this exam. Talk to your care team.  At your pre-op exam, talk to your care team about all medicines you take. If you need to stop any medicines before surgery, ask when to start taking them again.  We do this for your safety. Many medicines can make you bleed too much during surgery. Some change how well surgery (anesthesia) drugs work.  Call your insurance company to let them know you're having surgery. (If you don't have insurance, call [phone].)  Call your clinic if there's any change in your health. This includes signs of a cold or flu (sore throat, runny nose, cough, rash, fever). It also includes a scrape or scratch near the surgery site.  If you have questions on the day of surgery, call your hospital or surgery center.  Eating and drinking guidelines  For your safety: Unless your surgeon tells you otherwise, follow the guidelines below.  Eat and drink as usual until 8 hours before you arrive for surgery. After that, no food or milk.  Drink clear liquids until 2 hours before you arrive. These are liquids you can see through, like water, Gatorade, and Propel Water. They also include plain black coffee and tea (no cream or milk), candy, and breath mints. You can spit out gum when you arrive.  If you drink alcohol: Stop drinking it the night before surgery.  If your care team tells you to take medicine on the morning of surgery, it's okay to take it with a sip of water.  Preventing infection  Shower or bathe the night before and morning of your surgery. Follow the instructions your clinic gave you. (If no instructions, use regular soap.)  Don't shave or clip hair near your surgery site. We'll  remove the hair if needed.  Don't smoke or vape the morning of surgery. You may chew nicotine gum up to 2 hours before surgery. A nicotine patch is okay.  Note: Some surgeries require you to completely quit smoking and nicotine. Check with your surgeon.  Your care team will make every effort to keep you safe from infection. We will:  Clean our hands often with soap and water (or an alcohol-based hand rub).  Clean the skin at your surgery site with a special soap that kills germs.  Give you a special gown to keep you warm. (Cold raises the risk of infection.)  Wear special hair covers, masks, gowns and gloves during surgery.  Give antibiotic medicine, if prescribed. Not all surgeries need antibiotics.  What to bring on the day of surgery  Photo ID and insurance card  Copy of your health care directive, if you have one  Glasses and hearing aids (bring cases)  You can't wear contacts during surgery  Inhaler and eye drops, if you use them (tell us about these when you arrive)  CPAP machine or breathing device, if you use them  A few personal items, if spending the night  If you have . . .  A pacemaker, ICD (cardiac defibrillator) or other implant: Bring the ID card.  An implanted stimulator: Bring the remote control.  A legal guardian: Bring a copy of the certified (court-stamped) guardianship papers.  Please remove any jewelry, including body piercings. Leave jewelry and other valuables at home.  If you're going home the day of surgery  You must have a responsible adult drive you home. They should stay with you overnight as well.  If you don't have someone to stay with you, and you aren't safe to go home alone, we may keep you overnight. Insurance often won't pay for this.  After surgery  If it's hard to control your pain or you need more pain medicine, please call your surgeon's office.  Questions?   If you have any questions for your care team, list them here:  _________________________________________________________________________________________________________________________________________________________________________ ____________________________________ ____________________________________ ____________________________________  For informational purposes only. Not to replace the advice of your health care provider. Copyright   2003, 2019 [facility]. All rights reserved. Clinically reviewed by Nataliya Blake MD. [employer] 612747 - REV 12/22.

## 2023-12-21 NOTE — PROGRESS NOTES
[address] [address]  Phone: [phone]  Primary Provider: Shalonda Taylor  Pre-op Performing Provider: JAGUAR ROSE      PREOPERATIVE EVALUATION:  Today's date: 12/21/2023    Dara is a 68 year old, presenting for the following:  Pre-Op Exam (Posterior Lumbar fusion and decompression, 1-3-24, Dr. Solo, [facility])        12/21/2023     7:39 AM   Additional Questions   Roomed by Sanjuana RON   Accompanied by self         12/21/2023     7:39 AM   Patient Reported Additional Medications   Patient reports taking the following new medications none       Surgical Information:  Surgery/Procedure: Posterior lumbar fusion and decompression   Surgery Location: [facility]  Surgeon: Edil  Surgery Date: 1-3-24  Time of Surgery: TBD  Where patient plans to recover: At home with family  Fax number for surgical facility: Note does not need to be faxed, will be available electronically in Epic.    Assessment & Plan     The proposed surgical procedure is considered INTERMEDIATE risk.    Preop general physical exam  - EKG 12-lead complete w/read - Clinics  - CBC with platelets  - Basic metabolic panel    Spinal stenosis, lumbar region, without neurogenic claudication  - EKG 12-lead complete w/read - Clinics  - CBC with platelets  - Basic metabolic panel    Acute cystitis without hematuria  Treated for UTI on follow up patient symptoms are resolved. Will check urine prior to surgery   - nitroFURantoin macrocrystal-monohydrate (MACROBID) 100 MG capsule  Dispense: 14 capsule; Refill: 0  - UA with Microscopic reflex to Culture   1/2/2023 Repeat U/A completed not meeting criteria for urine culture and patient asymptomatic        Gastroesophageal reflux disease without esophagitis  Continue current medications as prescribed.       Prediabetes  - Hemoglobin A1c    Benign essential hypertension  Stable at present   - EKG 12-lead complete  w/read - Clinics  - CBC with platelets  - Basic metabolic panel    Dysuria  Will follow up and/or notify patient of  results via My Chart to determine further need for followup  - - UA with Microscopic reflex to Culture  - Urine Microscopic Exam  - Urine Culture    Bipolar affective disorder, remission status unspecified (H)  FOLLOW UP WITH SPECIALIST :Psychiatry  Stable at present       Coronary vasospasm (H24)  Hx of coronary vasospasm     Class 3 severe obesity due to excess calories with serious comorbidity and body mass index (BMI) of 40.0 to 44.9 in adult (H)  Healthy diet and exercise reviewed.  Limit pop and juice intake.  Risks of obesity discussed.  Encourage exercise.      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    Additional Medication Instructions:  Patient is to take all scheduled medications on the day of surgery EXCEPT for modifications listed below:   - ACE/ARB: HOLD on day of surgery (minimum 11 hours for general anesthesia).Iversartan   - Calcium Channel Blockers: May be continued on the day of surgery.diltiazem   - Statins: Continue taking on the day of surgery.    - GLP-1 Injectable (exenitide, liraglutide, semaglutide, dulaglutide, etc.): HOLD 7 days before surgery    - pregabalin, gabapentin: Continue without  modification.   - ibuprofen (Advil, Motrin): HOLD 7 day before surgery.    - SSRIs, SNRIs, TCAs, Antipsychotics: Continue without modification.     RECOMMENDATION:  APPROVAL GIVEN to proceed with proposed procedure, without further diagnostic evaluation.    Ordering of each unique test  Prescription drug management   Time spent by me doing chart review, history and exam, documentation and further activities per the note      Subjective       HPI related to upcoming procedure: spinal stenosis failing conservative treatment         12/21/2023     7:32 AM   Preop Questions   1. Have you ever had a heart attack or stroke? No   2. Have you ever had surgery on your heart or blood vessels, such as a stent placement, a coronary artery bypass, or surgery on an artery in your head, neck, heart, or legs? No   3. Do you have chest pain with activity? No   4. Do you have a history of  heart failure? No   5. Do you currently have a cold, bronchitis or symptoms of other infection? No   6. Do you have a cough, shortness of breath, or wheezing? No   7. Do you or anyone in your family have previous history of blood clots? YES - sister not sure why    8. Do you or does anyone in your family have a serious bleeding problem such as prolonged bleeding following surgeries or cuts? No   9. Have you ever had problems with anemia or been told to take iron pills? YES -when having her menses    10. Have you had any abnormal blood loss such as black, tarry or bloody stools, or abnormal vaginal bleeding? No   11. Have you ever had a blood transfusion? No   12. Are you willing to have a blood transfusion if it is medically needed before, during, or after your surgery? Yes   13. Have you or any of your relatives ever had problems with anesthesia? No   14. Do you have sleep apnea, excessive snoring or daytime drowsiness? No   15. Do you have any artifical heart valves or other implanted medical devices like a pacemaker, defibrillator, or continuous  glucose monitor? No   16. Do you have artificial joints? No   17. Are you allergic to latex? YES: respiratory issues        Health Care Directive:  Patient does not have a Health Care Directive or Living Will: Discussed advance care planning with patient; however, patient declined at this time.    Preoperative Review of :   reviewed - controlled substances prescribed by other outside provider(s).      Status of Chronic Conditions:  See problem list for active medical problems.  Problems all longstanding and stable, except as noted/documented.  See ROS for pertinent symptoms related to these conditions.    HYPERTENSION - Patient has longstanding history of HTN , currently denies any symptoms referable to elevated blood pressure. Specifically denies chest pain, palpitations, dyspnea, orthopnea, PND or peripheral edema. Blood pressure readings have been in normal range. Current medication regimen is as listed below. Patient denies any side effects of medication.     Review of Systems  CONSTITUTIONAL: NEGATIVE for fever, chills, change in weight  INTEGUMENTARY/SKIN: NEGATIVE for non-healing lesion  and rash   EYES: NEGATIVE for vision changes or irritation  ENT/MOUTH: NEGATIVE for ear, mouth and throat problems  RESP: NEGATIVE for significant cough or SOB  CV: NEGATIVE for chest pain, palpitations or peripheral edema  GI: NEGATIVE for nausea, abdominal pain, heartburn, or change in bowel habits   female: dysuria  MUSCULOSKELETAL: NEGATIVE for significant arthralgias or myalgia  NEURO: NEGATIVE for weakness, dizziness or paresthesias  ENDOCRINE: NEGATIVE for temperature intolerance, skin/hair changes  HEME: NEGATIVE for bleeding problems  PSYCHIATRIC: POSITIVE forHx anxiety, Hx depression, and see psychiatry regularly and is in good control at this time  and NEGATIVE forthoughts of hurting someone else and thoughts of self harm    Patient Active Problem List    Diagnosis Date Noted    Coronary vasospasm (H24)  09/15/2023     Priority: Medium    Morbid obesity (H) 08/03/2022     Priority: Medium    History of thyroidectomy 07/14/2021     Priority: Medium    Atrophic vaginitis 07/14/2021     Priority: Medium    Post-surgical hypothyroidism 07/14/2021     Priority: Medium    Encounter for screening mammogram for breast cancer 07/14/2021     Priority: Medium    Personal history of malignant neoplasm of breast 07/14/2021     Priority: Medium    Personal history of malignant neoplasm of thyroid 07/14/2021     Priority: Medium    Prediabetes 07/14/2021     Priority: Medium    Mixed hyperlipidemia 07/14/2021     Priority: Medium    Obesity with body mass index (BMI) of 30.0 to 39.9 07/28/2020     Priority: Medium    Elevated LFTs 07/28/2020     Priority: Medium    Anemia, unspecified type 07/28/2020     Priority: Medium    Urinary urgency 01/18/2019     Priority: Medium    Urge incontinence 06/01/2018     Priority: Medium    Pelvic somatic dysfunction 06/01/2018     Priority: Medium    Class 2 obesity due to excess calories with body mass index (BMI) of 36.0 to 36.9 in adult, unspecified whether serious comorbidity present 10/06/2017     Priority: Medium    Gastroesophageal reflux disease without esophagitis 10/06/2017     Priority: Medium    Low back pain, unspecified back pain laterality, unspecified chronicity, with sciatica presence unspecified 10/06/2017     Priority: Medium    Glaucoma      Priority: Medium    Bipolar affective disorder (H)      Priority: Medium    Primary thyroid papillary carcinoma (H)      Priority: Medium    Abnormal Pap smear and cervical HPV (human papillomavirus)      Priority: Medium     DARIO, conization 9/1990  IMO update changed this record. Please review for accuracy      Breast neoplasm      Priority: Medium     3.9 cm Stage 2B IDCa ER/N[place] +, HER2- 5/3/11right breast  Problem list name updated by automated process. Provider to review      Cerebral aneurysm, nonruptured      Priority: Medium      ophthalmic branch of left ICA. 2008.  Presented with syncope      Migraine without aura      Priority: Medium     Problem list name updated by automated process. Provider to review      Diverticulitis of colon      Priority: Medium     5/2007  Problem list name updated by automated process. Provider to review      Benign essential hypertension      Priority: Medium    Lumbago      Priority: Medium     right spinal canal cyst through L1-2 neuroforamin with large benign root sleeve cyst, [facility]      Spinal stenosis, lumbar region, without neurogenic claudication      Priority: Medium     2003 L4-5      Asymptomatic menopausal state      Priority: Medium     2004      Obstructive sleep apnea      Priority: Medium     Problem list name updated by automated process. Provider to review      Restless legs syndrome (RLS)      Priority: Medium    Breast cancer in female (H) 05/03/2011     Priority: Medium     invasive ductal ca, stage IIB, poorly differentiated,  ER and IA positive, lumpectomy,         Past Medical History:   Diagnosis Date    Abnormal Pap smear and cervical HPV (human papillomavirus)     DARIO, conization 9/1990    Acne     Alcohol abuse     Bipolar affective disorder (H)     Breast cancer in female (H) 05/03/2011    invasive ductal ca, stage IIB, poorly differentiated,  ER and IA positive, lumpectomy,     Cerebral aneurysm, nonruptured     ophthalmic branch of left ICA. 2008.  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    Major depressive disorder     Major depressive disorder, recurrent episode, unspecified     Suicide attempt 10/2000    Menopause     2004    Migraine  without aura, without mention of intractable migraine without mention of status migrainosus     Neoplasm of unspecified nature of breast     3.9 cm Stage 2B IDCa ER/NE +, HER2- 5/3/11right breast    Obstructive sleep apnea (adult) (pediatric)     Other and unspecified hyperlipidemia     Postmenopausal bleeding      biopsy negative    Primary thyroid papillary carcinoma (H)     Rectal bleeding     diverticular [facility]    Restless legs syndrome (RLS)     Spinal stenosis, lumbar region, without neurogenic claudication      L4-5    Thyroid cancer (H) 2012    Stage OMAR, papillary ca, s/p thyroidectomy and mod left neck dissection     Past Surgical History:   Procedure Laterality Date     SECTION      , 87, 97    COLECTOMY LEFT      sigmoid colon , diverticulitis, colostomy  reanastomosis    COLONOSCOPY      2009    LAMINECTOMY LUMBAR ONE LEVEL      for cauda equina syndrome, 2006    LAPAROTOMY EXPLORATORY      , for ?bowel obstx r/o pelvic infection    LUMPECTOMY BREAST      right breast 5/3/11    MAMMOPLASTY REDUCTION BILATERAL      11    THYROIDECTOMY      TONSILLECTOMY      10/26/03    WRIST SURGERY       Current Outpatient Medications   Medication Sig Dispense Refill    atomoxetine (STRATTERA) 100 MG capsule Take 100 mg by mouth every morning      atomoxetine (STRATTERA) 60 MG capsule Take 60 mg by mouth At Bedtime      calcium carbonate (OS-RUBEN 500 MG [place]. [place]) 500 MG tablet Take 2 tablets by mouth daily. (Patient taking differently: Take 1,000 mg by mouth 2 times daily) 60 tablet 0    cyclobenzaprine (FLEXERIL) 10 MG tablet Take 1 tablet (10 mg) by mouth daily as needed for muscle spasms 90 tablet 3    diltiazem ER COATED BEADS (CARDIZEM CD/CARTIA XT) 240 MG 24 hr capsule Take 1 capsule (240 mg) by mouth daily **Labs due for further refills [phone]** 90 capsule 0    estradiol (ESTRACE) 0.1 MG/GM vaginal cream Place 2 g vaginally  twice a week (Patient taking differently: Place vaginally twice a week) 42.5 g 3    eszopiclone (LUNESTA) 3 MG tablet TAKE 1 TABLET BY MOUTH AT BEDTIME AS NEEDED FOR SLEEP  2    fish oil-omega-3 fatty acids 1000 MG capsule Take 1 capsule by mouth      gabapentin (NEURONTIN) 300 MG capsule Take 300 mg by mouth at bedtime      ibuprofen (ADVIL/MOTRIN) 200 MG tablet Take 4 tablets (800 mg) by mouth daily as needed for moderate pain Do not take if you have stomach upset or gastrointestinal bleeding. 180 tablet 3    irbesartan (AVAPRO) 150 MG tablet Take 1 tablet (150 mg) by mouth At Bedtime Please complete ordered labs for further refills. 90 tablet 0    latanoprost (XALATAN) 0.005 % ophthalmic solution Place 1 drop into both eyes At Bedtime      lurasidone (LATUDA) 60 MG TABS tablet Take 80 mg by mouth at bedtime      montelukast (SINGULAIR) 10 MG tablet Take 1 tablet (10 mg) by mouth At Bedtime *Due for office visit 8/2023, please call clinic to schedule* 90 tablet 0    multivitamin, therapeutic with minerals (THERA-VIT-M) TABS Take 1 tablet by mouth daily 90 each 3    nitroFURantoin macrocrystal-monohydrate (MACROBID) 100 MG capsule Take 1 capsule (100 mg) by mouth 2 times daily for 7 days 14 capsule 0    OYSCO 500 + D 500-200 MG-UNIT tablet TAKE 2 TABLETS BY MOUTH TWICE A DAY      pantoprazole (PROTONIX) 40 MG EC tablet Take 1 tablet (40 mg) by mouth 2 times daily 180 tablet 2    polyethylene glycol (MIRALAX) powder Take 17 g by mouth daily 119 g 2    QUEtiapine (SEROQUEL XR) 50 MG TB24 24 hr tablet Take 150 mg by mouth at bedtime Take 1.5 tablets daily.      rosuvastatin (CRESTOR) 40 MG tablet Take 1 tablet (40 mg) by mouth daily Please complete ordered labs for further refills. 90 tablet 0    semaglutide (OZEMPIC) 2 MG/3ML pen Inject 0.5 mg Subcutaneous every 7 days 3 mL 11    solifenacin (VESICARE) 5 MG tablet Take 1 tablet (5 mg) by mouth daily * SCHEDULE CLINIC APPT.* 30 tablet 0    SYNTHROID 150 MCG tablet  Take 150 mcg by mouth daily      timolol maleate (TIMOPTIC) 0.5 % ophthalmic solution PLACE 1 DROP INTO LEFT EYE ONCE DAILY.      vitamin  B complex with vitamin C (STRESS TAB) TABS Take 1 tablet by mouth daily 90 tablet 1    vortioxetine (TRINTELLIX) 20 MG tablet Take 30 mg by mouth daily         Allergies   Allergen Reactions    Contrast Dye Shortness Of Breath    Dust Mite Extract Other (See Comments)     Sneezing    Dust Mites Other (See Comments)     Sneezing around dogs and cats    Hydrochlorothiazide     Iodinated Contrast Media     Latex     Latex     Lipitor [Atorvastatin Calcium]      myalgias    Oxycodone-Acetaminophen Nausea and Vomiting    Sulfa Antibiotics      arthralgias    Vicodin [Hydrocodone-Acetaminophen]      Nausea, vomiting    Wound Dressing Adhesive      PN: And type of tape with adhesive other than silk tape        Social History     Tobacco Use    Smoking status: Never    Smokeless tobacco: Never   Substance Use Topics    Alcohol use: No     Family History   Problem Relation Age of Onset    Hypertension Mother     Breast Cancer Mother         age 80's    Heart Disease Mother     Prostate Cancer Father     Alcohol/Drug Father     Lipids Father     Cerebrovascular Disease Father         incidental finding on MRI    Cancer Father     Psychotic Disorder Sister         depression    Asthma Sister     Cancer Sister     Alcohol/Drug Brother     C.A.D. Brother         2 MI's with stents    Psychotic Disorder Brother         depression    Heart Disease Brother         Aortic and mitral valve replacement    Esophageal Cancer Brother         diet age 61    Blood Disease Maternal Grandmother         DVT, PE    Cancer Maternal Grandmother     Psychotic Disorder Son         autism    Asthma Son     Psychotic Disorder Other         ADD (3 children)    Lung Cancer No family hx of      History   Drug Use No         Objective     /71 (BP Location: Left arm, Patient Position: Sitting, Cuff Size: Adult  "Large)   Pulse 100   Temp 98.3  F (36.8  C) (Oral)   Resp 20   Ht 1.575 m (5' 2\")   Wt 99.6 kg (219 lb 8 oz)   SpO2 97%   BMI 40.15 kg/m      Physical Exam    GENERAL APPEARANCE: alert, active, no distress, and morbidly obese     EYES: Eyes grossly normal to inspection and conjunctivae and sclerae normal     HENT: ear canals and TM's normal and nose and mouth without ulcers or lesions     NECK: no adenopathy     RESP: lungs clear to auscultation - no rales, rhonchi or wheezes     CV: regular rates and rhythm, no murmur, click or rub, and no irregular beats     ABDOMEN: soft, nontender     MS: extremities normal- no gross deformities noted and gait normal     SKIN: no suspicious lesions or rashes     NEURO: Normal strength and tone, sensory exam grossly normal, mentation intact and speech normal     PSYCH: mentation appears normal. and affect normal/bright     LYMPHATICS: No cervical adenopathy    Recent Labs   Lab Test 08/03/22  1206      POTASSIUM 5.0   CR 0.56        Diagnostics:  Recent Results (from the past 240 hour(s))   CBC with platelets    Collection Time: 12/21/23  9:07 AM   Result Value Ref Range    WBC Count 5.9 4.0 - 11.0 10e3/uL    RBC Count 4.14 3.80 - 5.20 10e6/uL    Hemoglobin 12.4 11.7 - 15.7 g/dL    Hematocrit 38.4 35.0 - 47.0 %    MCV 93 78 - 100 fL    MCH 30.0 26.5 - 33.0 pg    MCHC 32.3 31.5 - 36.5 g/dL    RDW 14.3 10.0 - 15.0 %    Platelet Count 233 150 - 450 10e3/uL   Basic metabolic panel    Collection Time: 12/21/23  9:07 AM   Result Value Ref Range    Sodium 141 135 - 145 mmol/L    Potassium 4.1 3.4 - 5.3 mmol/L    Chloride 104 98 - 107 mmol/L    Carbon Dioxide (CO2) 27 22 - 29 mmol/L    Anion Gap 10 7 - 15 mmol/L    Urea Nitrogen 10.4 8.0 - 23.0 mg/dL    Creatinine 0.79 0.51 - 0.95 mg/dL    GFR Estimate 81 >60 mL/min/1.73m2    Calcium 9.1 8.8 - 10.2 mg/dL    Glucose 96 70 - 99 mg/dL   Hemoglobin A1c    Collection Time: 12/21/23  9:07 AM   Result Value Ref Range    Hemoglobin " A1C 5.8 (H) 0.0 - 5.6 %   UA with Microscopic reflex to Culture    Collection Time: 12/21/23  9:07 AM    Specimen: Urine, Clean Catch   Result Value Ref Range    Color Urine Yellow Colorless, Straw, Light Yellow, Yellow    Appearance Urine Cloudy (A) Clear    Glucose Urine Negative Negative mg/dL    Bilirubin Urine Negative Negative    Ketones Urine Trace (A) Negative mg/dL    Specific Gravity Urine 1.025 1.003 - 1.035    Blood Urine Small (A) Negative    pH Urine 6.0 5.0 - 7.0    Protein Albumin Urine 30 (A) Negative mg/dL    Urobilinogen Urine 0.2 0.2, 1.0 E.U./dL    Nitrite Urine Positive (A) Negative    Leukocyte Esterase Urine Moderate (A) Negative   Urine Microscopic Exam    Collection Time: 12/21/23  9:07 AM   Result Value Ref Range    Bacteria Urine Moderate (A) None Seen /HPF    RBC Urine 0-2 0-2 /HPF /HPF    WBC Urine >100 (A) 0-5 /HPF /HPF    Squamous Epithelials Urine Few (A) None Seen /LPF    WBC Clumps Urine Present (A) None Seen /HPF   Urine Culture    Collection Time: 12/21/23  9:07 AM    Specimen: Urine, Clean Catch   Result Value Ref Range    Culture >100,000 CFU/mL Klebsiella oxytoca (A)     Culture 50,000-100,000 CFU/mL Klebsiella oxytoca (A)        Susceptibility    Klebsiella oxytoca - BHANU     Ampicillin*  Resistant       * Intrinsically Resistant     Ampicillin/ Sulbactam 8 Susceptible ug/mL     Piperacillin/Tazobactam <=4 Susceptible ug/mL     Cefazolin* >=64 Resistant ug/mL      * Cefazolin BHANU breakpoints are for the treatment of uncomplicated urinary tract infections. For the treatment of systemic infections, please contact the laboratory for additional testing.     Cefoxitin <=4 Susceptible ug/mL     Ceftazidime <=1 Susceptible ug/mL     Ceftriaxone <=1 Susceptible ug/mL     Cefepime <=1 Susceptible ug/mL     Gentamicin <=1 Susceptible ug/mL     Tobramycin <=1 Susceptible ug/mL     Ciprofloxacin <=0.25 Susceptible ug/mL     Levofloxacin <=0.12 Susceptible ug/mL     Nitrofurantoin <=16  Susceptible ug/mL     Trimethoprim/Sulfamethoxazole <=1/19 Susceptible ug/mL    Klebsiella oxytoca - BHANU     Ampicillin*  Resistant       * Intrinsically Resistant     Ampicillin/ Sulbactam 8 Susceptible ug/mL     Piperacillin/Tazobactam <=4 Susceptible ug/mL     Cefazolin* 8 Susceptible ug/mL      * Cefazolin BHANU breakpoints are for the treatment of uncomplicated urinary tract infections. For the treatment of systemic infections, please contact the laboratory for additional testing.     Cefoxitin <=4 Susceptible ug/mL     Ceftazidime <=1 Susceptible ug/mL     Ceftriaxone <=1 Susceptible ug/mL     Cefepime <=1 Susceptible ug/mL     Gentamicin <=1 Susceptible ug/mL     Tobramycin <=1 Susceptible ug/mL     Ciprofloxacin <=0.25 Susceptible ug/mL     Levofloxacin <=0.12 Susceptible ug/mL     Nitrofurantoin <=16 Susceptible ug/mL     Trimethoprim/Sulfamethoxazole <=1/19 Susceptible ug/mL      EKG: appears normal, NSR, normal axis, normal intervals, no acute ST/T changes c/w ischemia, no LVH by voltage criteria, unchanged from previous tracings    Revised Cardiac Risk Index (RCRI):  The patient has the following serious cardiovascular risks for perioperative complications:   - No serious cardiac risks = 0 points     RCRI Interpretation: 0 points: Class I (very low risk - 0.4% complication rate)         Signed Electronically by: STEPHANIE Conklin CNP  Copy of this evaluation report is provided to requesting physician.

## 2023-12-22 NOTE — RESULT ENCOUNTER NOTE
Edgardo Lamb,    Attached are your test results.  -Normal red blood cell (hgb) levels, normal white blood cell count and normal platelet levels.  -Kidney function is normal (Cr, GFR), Sodium is normal, Potassium is normal, Calcium is normal, Glucose is normal.   -A1C (diabetic test) is normal and indicates that your blood sugar has been in a normal range the last 3 months.  -Urine is abnormal. Am going to send in antibiotic and then have you repeat your urine specimen after completing the antibiotic    Please contact us if you have any questions.    Tabatha Dawn, CNP

## 2023-12-24 LAB
BACTERIA UR CULT: ABNORMAL
BACTERIA UR CULT: ABNORMAL

## 2023-12-26 NOTE — RESULT ENCOUNTER NOTE
Edgardo Lamb,    Attached are your test results.  -Urine culture is abnormal and grew out bacteria that are sensitive to the antibiotic you have been given.  Complete the medication as prescribed and if you experience new, worsening or persistent symptoms, you should call or return for a recheck.   Please contact us if you have any questions.    Tabatha Dawn, CNP

## 2023-12-28 ENCOUNTER — TELEPHONE (OUTPATIENT)
Dept: FAMILY MEDICINE | Facility: CLINIC | Age: 68
End: 2023-12-28
Payer: COMMERCIAL

## 2023-12-28 ENCOUNTER — TELEPHONE (OUTPATIENT)
Dept: INTERNAL MEDICINE | Facility: CLINIC | Age: 68
End: 2023-12-28
Payer: COMMERCIAL

## 2023-12-28 NOTE — TELEPHONE ENCOUNTER
Patient called back states that she does not have a fax number for the clinic but can provide us with a phone number to call them [facility] [phone]

## 2023-12-28 NOTE — TELEPHONE ENCOUNTER
RN called patient and LVM to call clinic at [phone].     If patient calls back please relay provider message below and help schedule for lab only urine test.      Note to RN: There are current orders for repeat urine. Physical visit on 12/21/23 states she needed repeat urine after taking all antibiotics for UTI she had.    TAMIKO Stein  [facility] Triage

## 2023-12-28 NOTE — TELEPHONE ENCOUNTER
Pt returning call back about getting lab. Pt was notified she need labs done to get clear for surgery. [place] and will be back tomorrow but late. She will sent us a fax number for a clinic in [place] so she can get it done.

## 2023-12-28 NOTE — TELEPHONE ENCOUNTER
Called the number below in regards to pt' lab. They stated that they sent their lab to another location for result so they don't know if pt will get it in time. Called pt to let her know and she stated she will call us again when she find a clinic to do it at.

## 2023-12-29 NOTE — TELEPHONE ENCOUNTER
RN called patient and LVM to call clinic at [phone].     If patient calls back please relay provider message below.    TAMIKO Stein  [facility] Triage

## 2024-01-02 ENCOUNTER — LAB (OUTPATIENT)
Dept: LAB | Facility: CLINIC | Age: 69
End: 2024-01-02
Payer: COMMERCIAL

## 2024-01-02 DIAGNOSIS — N30.00 ACUTE CYSTITIS WITHOUT HEMATURIA: ICD-10-CM

## 2024-01-02 LAB
ALBUMIN UR-MCNC: 30 MG/DL
APPEARANCE UR: ABNORMAL
BILIRUB UR QL STRIP: NEGATIVE
COLOR UR AUTO: ABNORMAL
GLUCOSE UR STRIP-MCNC: NEGATIVE MG/DL
HGB UR QL STRIP: NEGATIVE
HYALINE CASTS: 1 /LPF
KETONES UR STRIP-MCNC: NEGATIVE MG/DL
LEUKOCYTE ESTERASE UR QL STRIP: ABNORMAL
MUCOUS THREADS #/AREA URNS LPF: PRESENT /LPF
NITRATE UR QL: NEGATIVE
PH UR STRIP: 5.5 [PH] (ref 5–7)
RBC URINE: 1 /HPF
SP GR UR STRIP: 1.03 (ref 1–1.03)
SQUAMOUS EPITHELIAL: 19 /HPF
UROBILINOGEN UR STRIP-MCNC: <2 MG/DL
WBC URINE: 7 /HPF

## 2024-01-02 PROCEDURE — 81001 URINALYSIS AUTO W/SCOPE: CPT

## 2024-01-02 NOTE — TELEPHONE ENCOUNTER
See other telephone encounter dated 12/28/23.    Emil Mccloud RN  [facility]

## 2024-01-02 NOTE — TELEPHONE ENCOUNTER
Called patient to follow up and see if patient was able to find a clinic to do her urine lab at, as PCP stated the following in a [employer] message dated 12/28/23:    [employer] showing message read by patient.    RN called patient and LVM to call clinic at [phone].     If patient calls back, please ask if patient was able to get repeat urine testing completed. Current order is placed for a UA. If she has not had a UA done again, assist with warm transferring to central scheduling to help schedule lab only appointment near [place] at patients request. This is within same [employer] message dated 12/28/23:      TAMIKO Stein  [facility] Triage     No lymphadedenopathy

## 2024-01-03 ENCOUNTER — APPOINTMENT (OUTPATIENT)
Dept: RADIOLOGY | Facility: CLINIC | Age: 69
End: 2024-01-03
Attending: ORTHOPAEDIC SURGERY
Payer: COMMERCIAL

## 2024-01-03 ENCOUNTER — ANESTHESIA (OUTPATIENT)
Dept: SURGERY | Facility: CLINIC | Age: 69
End: 2024-01-03
Payer: COMMERCIAL

## 2024-01-03 ENCOUNTER — ANESTHESIA EVENT (OUTPATIENT)
Dept: SURGERY | Facility: CLINIC | Age: 69
End: 2024-01-03
Payer: COMMERCIAL

## 2024-01-03 ENCOUNTER — HOSPITAL ENCOUNTER (OUTPATIENT)
Facility: CLINIC | Age: 69
Discharge: HOME | End: 2024-01-05
Attending: ORTHOPAEDIC SURGERY | Admitting: ORTHOPAEDIC SURGERY
Payer: COMMERCIAL

## 2024-01-03 DIAGNOSIS — E66.09 CLASS 2 OBESITY DUE TO EXCESS CALORIES WITH BODY MASS INDEX (BMI) OF 36.0 TO 36.9 IN ADULT, UNSPECIFIED WHETHER SERIOUS COMORBIDITY PRESENT: ICD-10-CM

## 2024-01-03 DIAGNOSIS — M54.16 LUMBAR RADICULOPATHY: Primary | ICD-10-CM

## 2024-01-03 DIAGNOSIS — E66.812 CLASS 2 OBESITY DUE TO EXCESS CALORIES WITH BODY MASS INDEX (BMI) OF 36.0 TO 36.9 IN ADULT, UNSPECIFIED WHETHER SERIOUS COMORBIDITY PRESENT: ICD-10-CM

## 2024-01-03 PROCEDURE — 250N000011 HC RX IP 250 OP 636: Performed by: ORTHOPAEDIC SURGERY

## 2024-01-03 PROCEDURE — P9045 ALBUMIN (HUMAN), 5%, 250 ML: HCPCS | Mod: JZ | Performed by: ANESTHESIOLOGY

## 2024-01-03 PROCEDURE — 250N000025 HC SEVOFLURANE, PER MIN: Performed by: ORTHOPAEDIC SURGERY

## 2024-01-03 PROCEDURE — 250N000011 HC RX IP 250 OP 636: Performed by: ANESTHESIOLOGY

## 2024-01-03 PROCEDURE — 258N000003 HC RX IP 258 OP 636: Performed by: ANESTHESIOLOGY

## 2024-01-03 PROCEDURE — 250N000011 HC RX IP 250 OP 636: Performed by: NURSE ANESTHETIST, CERTIFIED REGISTERED

## 2024-01-03 PROCEDURE — 250N000009 HC RX 250: Performed by: ANESTHESIOLOGY

## 2024-01-03 PROCEDURE — 99204 OFFICE O/P NEW MOD 45 MIN: CPT | Performed by: HOSPITALIST

## 2024-01-03 PROCEDURE — C1713 ANCHOR/SCREW BN/BN,TIS/BN: HCPCS | Performed by: ORTHOPAEDIC SURGERY

## 2024-01-03 PROCEDURE — 250N000013 HC RX MED GY IP 250 OP 250 PS 637: Performed by: HOSPITALIST

## 2024-01-03 PROCEDURE — 258N000003 HC RX IP 258 OP 636: Performed by: ORTHOPAEDIC SURGERY

## 2024-01-03 PROCEDURE — 360N000086 HC SURGERY LEVEL 6 W/ FLUORO, PER MIN: Performed by: ORTHOPAEDIC SURGERY

## 2024-01-03 PROCEDURE — 370N000017 HC ANESTHESIA TECHNICAL FEE, PER MIN: Performed by: ORTHOPAEDIC SURGERY

## 2024-01-03 PROCEDURE — 999N000182 XR SURGERY CARM FLUORO GREATER THAN 5 MIN

## 2024-01-03 PROCEDURE — 250N000009 HC RX 250: Performed by: ORTHOPAEDIC SURGERY

## 2024-01-03 PROCEDURE — 250N000013 HC RX MED GY IP 250 OP 250 PS 637: Performed by: ORTHOPAEDIC SURGERY

## 2024-01-03 PROCEDURE — 250N000013 HC RX MED GY IP 250 OP 250 PS 637: Performed by: PHYSICIAN ASSISTANT

## 2024-01-03 PROCEDURE — 710N000010 HC RECOVERY PHASE 1, LEVEL 2, PER MIN: Performed by: ORTHOPAEDIC SURGERY

## 2024-01-03 PROCEDURE — C1762 CONN TISS, HUMAN(INC FASCIA): HCPCS | Performed by: ORTHOPAEDIC SURGERY

## 2024-01-03 PROCEDURE — 272N000001 HC OR GENERAL SUPPLY STERILE: Performed by: ORTHOPAEDIC SURGERY

## 2024-01-03 PROCEDURE — L8699 PROSTHETIC IMPLANT NOS: HCPCS | Performed by: ORTHOPAEDIC SURGERY

## 2024-01-03 PROCEDURE — 999N000141 HC STATISTIC PRE-PROCEDURE NURSING ASSESSMENT: Performed by: ORTHOPAEDIC SURGERY

## 2024-01-03 PROCEDURE — 250N000011 HC RX IP 250 OP 636: Performed by: PHYSICIAN ASSISTANT

## 2024-01-03 PROCEDURE — 250N000005 HC OR RX SURGIFLO HEMOSTATIC MATRIX 10ML 199102S OPNP: Performed by: ORTHOPAEDIC SURGERY

## 2024-01-03 PROCEDURE — C1889 IMPLANT/INSERT DEVICE, NOC: HCPCS | Performed by: ORTHOPAEDIC SURGERY

## 2024-01-03 DEVICE — IMPLANTABLE DEVICE: Type: IMPLANTABLE DEVICE | Site: SPINE LUMBAR | Status: FUNCTIONAL

## 2024-01-03 DEVICE — GRAFT BONE INFUSE BMP SM 7510200: Type: IMPLANTABLE DEVICE | Site: SPINE LUMBAR | Status: FUNCTIONAL

## 2024-01-03 DEVICE — MODULUS XLW, 10X22X50MM 10°
Type: IMPLANTABLE DEVICE | Site: SPINE LUMBAR | Status: FUNCTIONAL
Brand: MODULUS

## 2024-01-03 DEVICE — GRAFT BN CANC 30CC CRSH 1-10MM 800104: Type: IMPLANTABLE DEVICE | Site: SPINE LUMBAR | Status: FUNCTIONAL

## 2024-01-03 DEVICE — SCREW BN 55MM 6.5MM MA CNN XTD TAB NS SOLERA CD HZN SPNE: Type: IMPLANTABLE DEVICE | Site: SPINE LUMBAR | Status: FUNCTIONAL

## 2024-01-03 DEVICE — IMP SCR MEDT VOYAGER 4.75MM 6440530: Type: IMPLANTABLE DEVICE | Site: SPINE LUMBAR | Status: FUNCTIONAL

## 2024-01-03 RX ORDER — TIMOLOL MALEATE 5 MG/ML
1 SOLUTION/ DROPS OPHTHALMIC DAILY
Status: DISCONTINUED | OUTPATIENT
Start: 2024-01-04 | End: 2024-01-05 | Stop reason: HOSPADM

## 2024-01-03 RX ORDER — HYDROMORPHONE HCL IN WATER/PF 6 MG/30 ML
0.4 PATIENT CONTROLLED ANALGESIA SYRINGE INTRAVENOUS EVERY 5 MIN PRN
Status: DISCONTINUED | OUTPATIENT
Start: 2024-01-03 | End: 2024-01-03 | Stop reason: HOSPADM

## 2024-01-03 RX ORDER — QUETIAPINE 150 MG/1
150 TABLET, FILM COATED, EXTENDED RELEASE ORAL AT BEDTIME
COMMUNITY

## 2024-01-03 RX ORDER — HYDROMORPHONE HYDROCHLORIDE 2 MG/1
4 TABLET ORAL EVERY 4 HOURS PRN
Status: DISCONTINUED | OUTPATIENT
Start: 2024-01-03 | End: 2024-01-04

## 2024-01-03 RX ORDER — NALOXONE HYDROCHLORIDE 0.4 MG/ML
0.2 INJECTION, SOLUTION INTRAMUSCULAR; INTRAVENOUS; SUBCUTANEOUS
Status: DISCONTINUED | OUTPATIENT
Start: 2024-01-03 | End: 2024-01-05 | Stop reason: HOSPADM

## 2024-01-03 RX ORDER — SODIUM CHLORIDE 9 MG/ML
INJECTION, SOLUTION INTRAVENOUS CONTINUOUS
Status: DISCONTINUED | OUTPATIENT
Start: 2024-01-03 | End: 2024-01-05 | Stop reason: HOSPADM

## 2024-01-03 RX ORDER — IRBESARTAN 150 MG/1
150 TABLET ORAL AT BEDTIME
Status: DISCONTINUED | OUTPATIENT
Start: 2024-01-03 | End: 2024-01-03

## 2024-01-03 RX ORDER — AMOXICILLIN 250 MG
1 CAPSULE ORAL 2 TIMES DAILY
Status: DISCONTINUED | OUTPATIENT
Start: 2024-01-03 | End: 2024-01-05 | Stop reason: HOSPADM

## 2024-01-03 RX ORDER — ATOMOXETINE 25 MG/1
100 CAPSULE ORAL EVERY MORNING
Status: DISCONTINUED | OUTPATIENT
Start: 2024-01-04 | End: 2024-01-05 | Stop reason: HOSPADM

## 2024-01-03 RX ORDER — SODIUM CHLORIDE, SODIUM LACTATE, POTASSIUM CHLORIDE, CALCIUM CHLORIDE 600; 310; 30; 20 MG/100ML; MG/100ML; MG/100ML; MG/100ML
INJECTION, SOLUTION INTRAVENOUS CONTINUOUS
Status: DISCONTINUED | OUTPATIENT
Start: 2024-01-03 | End: 2024-01-03 | Stop reason: HOSPADM

## 2024-01-03 RX ORDER — MAGNESIUM HYDROXIDE 1200 MG/15ML
LIQUID ORAL PRN
Status: DISCONTINUED | OUTPATIENT
Start: 2024-01-03 | End: 2024-01-03 | Stop reason: HOSPADM

## 2024-01-03 RX ORDER — ESMOLOL HYDROCHLORIDE 10 MG/ML
INJECTION INTRAVENOUS PRN
Status: DISCONTINUED | OUTPATIENT
Start: 2024-01-03 | End: 2024-01-03

## 2024-01-03 RX ORDER — LORATADINE 10 MG/1
10 TABLET ORAL DAILY PRN
Status: DISCONTINUED | OUTPATIENT
Start: 2024-01-03 | End: 2024-01-05 | Stop reason: HOSPADM

## 2024-01-03 RX ORDER — LEVOTHYROXINE SODIUM 150 UG/1
150 TABLET ORAL DAILY
COMMUNITY
End: 2024-01-13

## 2024-01-03 RX ORDER — DILTIAZEM HYDROCHLORIDE 120 MG/1
240 CAPSULE, COATED, EXTENDED RELEASE ORAL DAILY
Status: DISCONTINUED | OUTPATIENT
Start: 2024-01-04 | End: 2024-01-05 | Stop reason: HOSPADM

## 2024-01-03 RX ORDER — CYCLOBENZAPRINE HCL 10 MG
10 TABLET ORAL AT BEDTIME
COMMUNITY
End: 2024-01-16

## 2024-01-03 RX ORDER — MULTIVITAMIN,THERAPEUTIC
1 TABLET ORAL DAILY
Status: DISCONTINUED | OUTPATIENT
Start: 2024-01-04 | End: 2024-01-03

## 2024-01-03 RX ORDER — FENTANYL CITRATE 50 UG/ML
INJECTION, SOLUTION INTRAMUSCULAR; INTRAVENOUS PRN
Status: DISCONTINUED | OUTPATIENT
Start: 2024-01-03 | End: 2024-01-03

## 2024-01-03 RX ORDER — PANTOPRAZOLE SODIUM 40 MG/1
40 TABLET, DELAYED RELEASE ORAL
Status: DISCONTINUED | OUTPATIENT
Start: 2024-01-04 | End: 2024-01-05 | Stop reason: HOSPADM

## 2024-01-03 RX ORDER — GABAPENTIN 100 MG/1
100 CAPSULE ORAL
Status: COMPLETED | OUTPATIENT
Start: 2024-01-03 | End: 2024-01-03

## 2024-01-03 RX ORDER — CEFAZOLIN SODIUM/WATER 2 G/20 ML
2 SYRINGE (ML) INTRAVENOUS
Status: COMPLETED | OUTPATIENT
Start: 2024-01-03 | End: 2024-01-03

## 2024-01-03 RX ORDER — HYDROMORPHONE HCL IN WATER/PF 6 MG/30 ML
0.2 PATIENT CONTROLLED ANALGESIA SYRINGE INTRAVENOUS EVERY 5 MIN PRN
Status: DISCONTINUED | OUTPATIENT
Start: 2024-01-03 | End: 2024-01-03 | Stop reason: HOSPADM

## 2024-01-03 RX ORDER — HYDROMORPHONE HCL IN WATER/PF 6 MG/30 ML
0.2 PATIENT CONTROLLED ANALGESIA SYRINGE INTRAVENOUS
Status: DISCONTINUED | OUTPATIENT
Start: 2024-01-03 | End: 2024-01-05 | Stop reason: HOSPADM

## 2024-01-03 RX ORDER — ONDANSETRON 4 MG/1
4 TABLET, ORALLY DISINTEGRATING ORAL EVERY 30 MIN PRN
Status: DISCONTINUED | OUTPATIENT
Start: 2024-01-03 | End: 2024-01-03 | Stop reason: HOSPADM

## 2024-01-03 RX ORDER — FENTANYL CITRATE 50 UG/ML
50 INJECTION, SOLUTION INTRAMUSCULAR; INTRAVENOUS EVERY 5 MIN PRN
Status: DISCONTINUED | OUTPATIENT
Start: 2024-01-03 | End: 2024-01-03 | Stop reason: HOSPADM

## 2024-01-03 RX ORDER — ONDANSETRON 2 MG/ML
4 INJECTION INTRAMUSCULAR; INTRAVENOUS EVERY 30 MIN PRN
Status: DISCONTINUED | OUTPATIENT
Start: 2024-01-03 | End: 2024-01-03 | Stop reason: HOSPADM

## 2024-01-03 RX ORDER — ACETAMINOPHEN 325 MG/1
975 TABLET ORAL EVERY 8 HOURS
Qty: 27 TABLET | Refills: 0 | Status: DISCONTINUED | OUTPATIENT
Start: 2024-01-03 | End: 2024-01-05 | Stop reason: HOSPADM

## 2024-01-03 RX ORDER — MAGNESIUM SULFATE 4 G/50ML
4 INJECTION INTRAVENOUS ONCE
Status: COMPLETED | OUTPATIENT
Start: 2024-01-03 | End: 2024-01-03

## 2024-01-03 RX ORDER — QUETIAPINE FUMARATE 50 MG/1
150 TABLET, EXTENDED RELEASE ORAL AT BEDTIME
Status: DISCONTINUED | OUTPATIENT
Start: 2024-01-03 | End: 2024-01-05 | Stop reason: HOSPADM

## 2024-01-03 RX ORDER — MULTIVITAMIN,THERAPEUTIC
1 TABLET ORAL DAILY
Status: DISCONTINUED | OUTPATIENT
Start: 2024-01-04 | End: 2024-01-05 | Stop reason: HOSPADM

## 2024-01-03 RX ORDER — ROSUVASTATIN CALCIUM 10 MG/1
40 TABLET, COATED ORAL EVERY EVENING
Status: DISCONTINUED | OUTPATIENT
Start: 2024-01-03 | End: 2024-01-05 | Stop reason: HOSPADM

## 2024-01-03 RX ORDER — LURASIDONE HYDROCHLORIDE 80 MG/1
80 TABLET, FILM COATED ORAL AT BEDTIME
COMMUNITY

## 2024-01-03 RX ORDER — GABAPENTIN 300 MG/1
300 CAPSULE ORAL AT BEDTIME
Status: DISCONTINUED | OUTPATIENT
Start: 2024-01-03 | End: 2024-01-05 | Stop reason: HOSPADM

## 2024-01-03 RX ORDER — ACETAMINOPHEN 325 MG/1
650 TABLET ORAL EVERY 4 HOURS PRN
Status: DISCONTINUED | OUTPATIENT
Start: 2024-01-06 | End: 2024-01-05 | Stop reason: HOSPADM

## 2024-01-03 RX ORDER — PROPOFOL 10 MG/ML
INJECTION, EMULSION INTRAVENOUS PRN
Status: DISCONTINUED | OUTPATIENT
Start: 2024-01-03 | End: 2024-01-03

## 2024-01-03 RX ORDER — PROPOFOL 10 MG/ML
INJECTION, EMULSION INTRAVENOUS CONTINUOUS PRN
Status: DISCONTINUED | OUTPATIENT
Start: 2024-01-03 | End: 2024-01-03

## 2024-01-03 RX ORDER — LURASIDONE HYDROCHLORIDE 20 MG/1
80 TABLET, FILM COATED ORAL AT BEDTIME
Status: DISCONTINUED | OUTPATIENT
Start: 2024-01-03 | End: 2024-01-05 | Stop reason: HOSPADM

## 2024-01-03 RX ORDER — LEVOTHYROXINE SODIUM 75 UG/1
150 TABLET ORAL DAILY
Status: DISCONTINUED | OUTPATIENT
Start: 2024-01-04 | End: 2024-01-05 | Stop reason: HOSPADM

## 2024-01-03 RX ORDER — BISACODYL 10 MG
10 SUPPOSITORY, RECTAL RECTAL DAILY PRN
Status: DISCONTINUED | OUTPATIENT
Start: 2024-01-03 | End: 2024-01-05 | Stop reason: HOSPADM

## 2024-01-03 RX ORDER — VASOPRESSIN IN 0.9 % NACL 2 UNIT/2ML
SYRINGE (ML) INTRAVENOUS PRN
Status: DISCONTINUED | OUTPATIENT
Start: 2024-01-03 | End: 2024-01-03

## 2024-01-03 RX ORDER — ESTRADIOL 10 UG/1
10 INSERT VAGINAL
Status: DISCONTINUED | OUTPATIENT
Start: 2024-01-08 | End: 2024-01-05 | Stop reason: HOSPADM

## 2024-01-03 RX ORDER — BUPIVACAINE HYDROCHLORIDE AND EPINEPHRINE 5; 5 MG/ML; UG/ML
INJECTION, SOLUTION PERINEURAL PRN
Status: DISCONTINUED | OUTPATIENT
Start: 2024-01-03 | End: 2024-01-03 | Stop reason: HOSPADM

## 2024-01-03 RX ORDER — EPHEDRINE SULFATE 50 MG/ML
INJECTION, SOLUTION INTRAMUSCULAR; INTRAVENOUS; SUBCUTANEOUS PRN
Status: DISCONTINUED | OUTPATIENT
Start: 2024-01-03 | End: 2024-01-03

## 2024-01-03 RX ORDER — ESZOPICLONE 1 MG/1
3 TABLET, FILM COATED ORAL
Status: DISCONTINUED | OUTPATIENT
Start: 2024-01-03 | End: 2024-01-05 | Stop reason: HOSPADM

## 2024-01-03 RX ORDER — CEFAZOLIN SODIUM 2 G/100ML
2 INJECTION, SOLUTION INTRAVENOUS EVERY 8 HOURS
Qty: 200 ML | Refills: 0 | Status: COMPLETED | OUTPATIENT
Start: 2024-01-03 | End: 2024-01-04

## 2024-01-03 RX ORDER — POLYETHYLENE GLYCOL 3350 17 G/17G
17 POWDER, FOR SOLUTION ORAL DAILY
Status: DISCONTINUED | OUTPATIENT
Start: 2024-01-04 | End: 2024-01-05 | Stop reason: HOSPADM

## 2024-01-03 RX ORDER — MULTIPLE VITAMINS W/ MINERALS TAB 9MG-400MCG
1 TAB ORAL DAILY
Status: DISCONTINUED | OUTPATIENT
Start: 2024-01-04 | End: 2024-01-05 | Stop reason: HOSPADM

## 2024-01-03 RX ORDER — LIDOCAINE HYDROCHLORIDE 10 MG/ML
INJECTION, SOLUTION INFILTRATION; PERINEURAL PRN
Status: DISCONTINUED | OUTPATIENT
Start: 2024-01-03 | End: 2024-01-03

## 2024-01-03 RX ORDER — FENTANYL CITRATE 50 UG/ML
25 INJECTION, SOLUTION INTRAMUSCULAR; INTRAVENOUS EVERY 5 MIN PRN
Status: DISCONTINUED | OUTPATIENT
Start: 2024-01-03 | End: 2024-01-03 | Stop reason: HOSPADM

## 2024-01-03 RX ORDER — ONDANSETRON 2 MG/ML
INJECTION INTRAMUSCULAR; INTRAVENOUS PRN
Status: DISCONTINUED | OUTPATIENT
Start: 2024-01-03 | End: 2024-01-03

## 2024-01-03 RX ORDER — HYDROMORPHONE HYDROCHLORIDE 2 MG/1
2 TABLET ORAL EVERY 4 HOURS PRN
Status: DISCONTINUED | OUTPATIENT
Start: 2024-01-03 | End: 2024-01-04

## 2024-01-03 RX ORDER — MONTELUKAST SODIUM 10 MG/1
10 TABLET ORAL AT BEDTIME
Status: DISCONTINUED | OUTPATIENT
Start: 2024-01-03 | End: 2024-01-05 | Stop reason: HOSPADM

## 2024-01-03 RX ORDER — CEFAZOLIN SODIUM/WATER 2 G/20 ML
2 SYRINGE (ML) INTRAVENOUS SEE ADMIN INSTRUCTIONS
Status: DISCONTINUED | OUTPATIENT
Start: 2024-01-03 | End: 2024-01-03 | Stop reason: HOSPADM

## 2024-01-03 RX ORDER — ONDANSETRON 4 MG/1
4 TABLET, ORALLY DISINTEGRATING ORAL EVERY 6 HOURS PRN
Status: DISCONTINUED | OUTPATIENT
Start: 2024-01-03 | End: 2024-01-05 | Stop reason: HOSPADM

## 2024-01-03 RX ORDER — DEXAMETHASONE SODIUM PHOSPHATE 10 MG/ML
INJECTION, SOLUTION INTRAMUSCULAR; INTRAVENOUS PRN
Status: DISCONTINUED | OUTPATIENT
Start: 2024-01-03 | End: 2024-01-03

## 2024-01-03 RX ORDER — ESTRADIOL 10 UG/1
10 INSERT VAGINAL PRN
COMMUNITY

## 2024-01-03 RX ORDER — NALOXONE HYDROCHLORIDE 0.4 MG/ML
0.4 INJECTION, SOLUTION INTRAMUSCULAR; INTRAVENOUS; SUBCUTANEOUS
Status: DISCONTINUED | OUTPATIENT
Start: 2024-01-03 | End: 2024-01-05 | Stop reason: HOSPADM

## 2024-01-03 RX ORDER — PROCHLORPERAZINE MALEATE 5 MG
5 TABLET ORAL EVERY 6 HOURS PRN
Status: DISCONTINUED | OUTPATIENT
Start: 2024-01-03 | End: 2024-01-05 | Stop reason: HOSPADM

## 2024-01-03 RX ORDER — LIDOCAINE 40 MG/G
CREAM TOPICAL
Status: DISCONTINUED | OUTPATIENT
Start: 2024-01-03 | End: 2024-01-03 | Stop reason: HOSPADM

## 2024-01-03 RX ORDER — IRBESARTAN 150 MG/1
150 TABLET ORAL AT BEDTIME
Status: DISCONTINUED | OUTPATIENT
Start: 2024-01-04 | End: 2024-01-05 | Stop reason: HOSPADM

## 2024-01-03 RX ORDER — LATANOPROST 50 UG/ML
1 SOLUTION/ DROPS OPHTHALMIC AT BEDTIME
Status: DISCONTINUED | OUTPATIENT
Start: 2024-01-03 | End: 2024-01-05 | Stop reason: HOSPADM

## 2024-01-03 RX ORDER — ONDANSETRON 2 MG/ML
4 INJECTION INTRAMUSCULAR; INTRAVENOUS EVERY 6 HOURS PRN
Status: DISCONTINUED | OUTPATIENT
Start: 2024-01-03 | End: 2024-01-05 | Stop reason: HOSPADM

## 2024-01-03 RX ORDER — CYCLOBENZAPRINE HCL 10 MG
10 TABLET ORAL AT BEDTIME
Status: DISCONTINUED | OUTPATIENT
Start: 2024-01-03 | End: 2024-01-05 | Stop reason: HOSPADM

## 2024-01-03 RX ORDER — BUPIVACAINE HYDROCHLORIDE AND EPINEPHRINE 2.5; 5 MG/ML; UG/ML
2 INJECTION, SOLUTION EPIDURAL; INFILTRATION; INTRACAUDAL; PERINEURAL ONCE
Status: DISCONTINUED | OUTPATIENT
Start: 2024-01-03 | End: 2024-01-03 | Stop reason: HOSPADM

## 2024-01-03 RX ORDER — HYDROMORPHONE HCL IN WATER/PF 6 MG/30 ML
0.4 PATIENT CONTROLLED ANALGESIA SYRINGE INTRAVENOUS
Status: DISCONTINUED | OUTPATIENT
Start: 2024-01-03 | End: 2024-01-05 | Stop reason: HOSPADM

## 2024-01-03 RX ADMIN — ROSUVASTATIN CALCIUM 40 MG: 10 TABLET, FILM COATED ORAL at 22:51

## 2024-01-03 RX ADMIN — ONDANSETRON 4 MG: 2 INJECTION INTRAMUSCULAR; INTRAVENOUS at 16:06

## 2024-01-03 RX ADMIN — GABAPENTIN 100 MG: 100 CAPSULE ORAL at 11:23

## 2024-01-03 RX ADMIN — HYDROMORPHONE HYDROCHLORIDE 2 MG: 2 TABLET ORAL at 22:31

## 2024-01-03 RX ADMIN — DEXAMETHASONE SODIUM PHOSPHATE 10 MG: 10 INJECTION, SOLUTION INTRAMUSCULAR; INTRAVENOUS at 13:07

## 2024-01-03 RX ADMIN — ACETAMINOPHEN 975 MG: 325 TABLET ORAL at 22:30

## 2024-01-03 RX ADMIN — MIDAZOLAM 2 MG: 1 INJECTION INTRAMUSCULAR; INTRAVENOUS at 12:58

## 2024-01-03 RX ADMIN — SODIUM CHLORIDE, POTASSIUM CHLORIDE, SODIUM LACTATE AND CALCIUM CHLORIDE: 600; 310; 30; 20 INJECTION, SOLUTION INTRAVENOUS at 16:26

## 2024-01-03 RX ADMIN — Medication 10 MG: at 14:02

## 2024-01-03 RX ADMIN — SODIUM CHLORIDE: 9 INJECTION, SOLUTION INTRAVENOUS at 22:51

## 2024-01-03 RX ADMIN — Medication 5 MG: at 15:25

## 2024-01-03 RX ADMIN — PROPOFOL 30 MG: 10 INJECTION, EMULSION INTRAVENOUS at 13:15

## 2024-01-03 RX ADMIN — Medication 100 MG: at 13:07

## 2024-01-03 RX ADMIN — HYDROMORPHONE HYDROCHLORIDE 0.25 MG: 1 INJECTION, SOLUTION INTRAMUSCULAR; INTRAVENOUS; SUBCUTANEOUS at 16:10

## 2024-01-03 RX ADMIN — ALBUMIN (HUMAN): 12.5 SOLUTION INTRAVENOUS at 14:02

## 2024-01-03 RX ADMIN — FENTANYL CITRATE 100 MCG: 50 INJECTION INTRAMUSCULAR; INTRAVENOUS at 13:07

## 2024-01-03 RX ADMIN — PHENYLEPHRINE HYDROCHLORIDE 200 MCG: 10 INJECTION INTRAVENOUS at 13:46

## 2024-01-03 RX ADMIN — CEFAZOLIN SODIUM 2 G: 2 INJECTION, SOLUTION INTRAVENOUS at 22:33

## 2024-01-03 RX ADMIN — SENNOSIDES AND DOCUSATE SODIUM 1 TABLET: 8.6; 5 TABLET ORAL at 22:51

## 2024-01-03 RX ADMIN — Medication 5 MG: at 14:25

## 2024-01-03 RX ADMIN — HYDROMORPHONE HYDROCHLORIDE 0.5 MG: 1 INJECTION, SOLUTION INTRAMUSCULAR; INTRAVENOUS; SUBCUTANEOUS at 16:57

## 2024-01-03 RX ADMIN — Medication 2 G: at 13:03

## 2024-01-03 RX ADMIN — LURASIDONE HYDROCHLORIDE 80 MG: 20 TABLET, COATED ORAL at 22:51

## 2024-01-03 RX ADMIN — Medication 1 UNITS: at 13:43

## 2024-01-03 RX ADMIN — Medication 5 MG: at 14:18

## 2024-01-03 RX ADMIN — Medication 5 MG: at 14:21

## 2024-01-03 RX ADMIN — LIDOCAINE HYDROCHLORIDE 50 MG: 10 INJECTION, SOLUTION INFILTRATION; PERINEURAL at 13:07

## 2024-01-03 RX ADMIN — SODIUM CHLORIDE, POTASSIUM CHLORIDE, SODIUM LACTATE AND CALCIUM CHLORIDE: 600; 310; 30; 20 INJECTION, SOLUTION INTRAVENOUS at 13:59

## 2024-01-03 RX ADMIN — HYDROMORPHONE HYDROCHLORIDE 0.5 MG: 1 INJECTION, SOLUTION INTRAMUSCULAR; INTRAVENOUS; SUBCUTANEOUS at 17:13

## 2024-01-03 RX ADMIN — MAGNESIUM SULFATE HEPTAHYDRATE 4 G: 4 INJECTION, SOLUTION INTRAVENOUS at 11:28

## 2024-01-03 RX ADMIN — Medication 1 UNITS: at 13:37

## 2024-01-03 RX ADMIN — PHENYLEPHRINE HYDROCHLORIDE 200 MCG: 10 INJECTION INTRAVENOUS at 13:36

## 2024-01-03 RX ADMIN — Medication 5 MG: at 14:46

## 2024-01-03 RX ADMIN — HYDROMORPHONE HYDROCHLORIDE 0.25 MG: 1 INJECTION, SOLUTION INTRAMUSCULAR; INTRAVENOUS; SUBCUTANEOUS at 16:19

## 2024-01-03 RX ADMIN — PROPOFOL 30 MCG/KG/MIN: 10 INJECTION, EMULSION INTRAVENOUS at 14:47

## 2024-01-03 RX ADMIN — Medication 10 MG: at 13:56

## 2024-01-03 RX ADMIN — PHENYLEPHRINE HYDROCHLORIDE 200 MCG: 10 INJECTION INTRAVENOUS at 14:10

## 2024-01-03 RX ADMIN — SODIUM CHLORIDE, POTASSIUM CHLORIDE, SODIUM LACTATE AND CALCIUM CHLORIDE: 600; 310; 30; 20 INJECTION, SOLUTION INTRAVENOUS at 11:45

## 2024-01-03 RX ADMIN — ESMOLOL HYDROCHLORIDE 20 MG: 10 INJECTION, SOLUTION INTRAVENOUS at 17:19

## 2024-01-03 RX ADMIN — PHENYLEPHRINE HYDROCHLORIDE 200 MCG: 10 INJECTION INTRAVENOUS at 14:02

## 2024-01-03 RX ADMIN — PHENYLEPHRINE HYDROCHLORIDE 100 MCG: 10 INJECTION INTRAVENOUS at 13:30

## 2024-01-03 RX ADMIN — PHENYLEPHRINE HYDROCHLORIDE 0.5 MCG/KG/MIN: 10 INJECTION INTRAVENOUS at 13:32

## 2024-01-03 RX ADMIN — PROPOFOL 170 MG: 10 INJECTION, EMULSION INTRAVENOUS at 13:07

## 2024-01-03 RX ADMIN — Medication 5 MG: at 14:10

## 2024-01-03 RX ADMIN — ALBUMIN (HUMAN): 12.5 SOLUTION INTRAVENOUS at 14:24

## 2024-01-03 RX ADMIN — HYDROMORPHONE HYDROCHLORIDE 0.5 MG: 1 INJECTION, SOLUTION INTRAMUSCULAR; INTRAVENOUS; SUBCUTANEOUS at 15:10

## 2024-01-03 RX ADMIN — QUETIAPINE FUMARATE 150 MG: 50 TABLET, EXTENDED RELEASE ORAL at 22:51

## 2024-01-03 RX ADMIN — PROPOFOL 50 MCG/KG/MIN: 10 INJECTION, EMULSION INTRAVENOUS at 13:21

## 2024-01-03 RX ADMIN — MONTELUKAST SODIUM 10 MG: 10 TABLET, COATED ORAL at 22:30

## 2024-01-03 ASSESSMENT — ACTIVITIES OF DAILY LIVING (ADL)
ADLS_ACUITY_SCORE: 23
ADLS_ACUITY_SCORE: 23
ADLS_ACUITY_SCORE: 22
ADLS_ACUITY_SCORE: 22
ADLS_ACUITY_SCORE: 23
ADLS_ACUITY_SCORE: 22
ADLS_ACUITY_SCORE: 22

## 2024-01-03 NOTE — ANESTHESIA PROCEDURE NOTES
Airway       Patient location during procedure: OR       Procedure Start/Stop Times: 1/3/2024 1:09 PM  Staff -        Anesthesiologist:  Karl Wu MD       CRNA: Hailee Green APRN CRNA       Performed By: CRNA  Consent for Airway        Urgency: elective  Indications and Patient Condition       Indications for airway management: estrellita-procedural       Induction type:intravenous       Mask difficulty assessment: 1 - vent by mask    Final Airway Details       Final airway type: endotracheal airway       Successful airway: ETT - single  Endotracheal Airway Details        ETT size (mm): 7.5       Cuffed: yes       Successful intubation technique: direct laryngoscopy       DL Blade Type: MAC 4       Grade View of Cords: 1       Adjucts: stylet       Position: Right       Measured from: gums/teeth       Secured at (cm): 22       Bite block used: None    Post intubation assessment        Placement verified by: capnometry, equal breath sounds and chest rise        Number of attempts at approach: 1       Number of other approaches attempted: 0       Secured with: tape       Ease of procedure: easy       Dentition: Intact and Unchanged       Dental guard used and removed. Dental Guard Type: Standard White.    Medication(s) Administered   Medication Administration Time: 1/3/2024 1:09 PM

## 2024-01-03 NOTE — INTERVAL H&P NOTE
"I have reviewed the surgical (or preoperative) H&P that is linked to this encounter, and examined the patient. There are no significant changes    Clinical Conditions Present on Arrival:  Clinically Significant Risk Factors Present on Admission                  # Obesity: Estimated body mass index is 39.69 kg/m  as calculated from the following:    Height as of this encounter: 1.575 m (5' 2\").    Weight as of this encounter: 98.4 kg (217 lb).       "

## 2024-01-03 NOTE — RESULT ENCOUNTER NOTE
Edgardo Lamb,    Attached are your test results.  -Urine appears concentrated but noted in your preop that you are no longer symptomatic as you had messaged me    Please contact us if you have any questions.    Tabatha Dawn, CNP

## 2024-01-03 NOTE — TELEPHONE ENCOUNTER
Patient's surgery is today and patient has been called a total of 3 times (within multiple encounters).  Routing to provider for further instruction.    Emil Mccloud RN  [facility]

## 2024-01-03 NOTE — OP NOTE
Orthopedic  Operative Note    Pre-operative diagnosis: 1.  Neurogenic claudication and lumbar radiculopathy in the setting of adjacent segment degeneration at L3-4 above prior fusion   2.  Morbid obesity, BMI 40    Post-operative diagnosis: 1.  Neurogenic claudication and lumbar radiculopathy in the setting of adjacent segment degeneration at L3-4 above prior fusion   2.  Morbid obesity, BMI 40    Procedure: 1.  L3-4 XLIF, NuVasive modulus   2.  L3-4 instrumented posterior fusion, Medtronic Voyager   3.  EMG neuromonitoring   4.  O-arm with Stealth navigation   5.  Cancellous allograft, infuse bone grafting    Surgeon: Bandar Solo MD; Oscar Goldstein MD    Assistant(s): Etta Becker PA-C is an experienced first surgical assistant whose assistance was necessary for patient positioning, hemostasis, soft tissue and neural retraction, closure, and safe progression of surgery.      Anesthesia: General endotracheal anesthesia    Estimated blood loss: 50 mL     Drains: None    Specimens: None    Indications:                               The patient is a 68-year-old female developed adjacent segment degeneration below prior fusion.  She failed conservative measures and elected for a single position lateral L3-4 XLIF.    I again reviewed the operative indications, the general and specific risks, benefits, and rehabilitation issues. Details of the procedure and postoperative recovery is highlighted. Patient and attending family appear to understand the issues and express their consent proceed.     Findings: None    Complications: None     Procedure Detail: The patient was evaluated in the preoperative holding area.  The patient was given the opportunity ask questions regarding the procedure in detail, and provided informed consent to proceed.  The back and side was marked with the surgeon initials at the anticipated level of the incision, the patient was brought back to the operative suite on a gurney.  There, the  patient was inducted under general anesthesia and placed lateral left side up onto a regular OR table.  All bony prominences were carefully padded.  The back was prepped and draped in the typical sterile fashion.  A preprocedural pause was performed to identify the correct patient, laterality, procedure to be performed, as well as administration of preoperative antibiotics.    I then initiated the procedure by making a small stab incision over the iliac crest slightly posterior to the ASIS.  A percutaneous pin was then impacted into the iliac crest and a O-arm spin was conducted.  I then set about placing screws.  Using a [place] approach I first determine appropriate incision location using O-arm guidance.  I then incised the skin using a 10 blade and dissected through subcu layers using electrocautery.  I divided the fascia using electrocautery and bluntly dissected down to the facet joints.  I then used a high-speed bur to form a  hole at the L3 and L4 pedicle levels under O-arm navigation.  I then undertapped using an awl tip tap and placed screws under navigational guidance after palpating the trajectory using a navigated feeler.  I then decorticated the L3-4 facet joint on the superior side and packed this with cancellous allograft.  I then turned my attention to the XLIF portion of the procedure.  Under O-arm guidance I drew out the disc base on the skin using a marker and then made to horizontal incisions over the disc base and slightly posterior to the space.  I developed the incision posteriorly first using electrocautery and bluntly dissected through the incision into the retroperitoneum.  I developed a plane in the retroperitoneum and then tented the disc incision up with my finger.  I then incised this incision using a 10 blade and dissected through subcuticular layers using electrocautery.  I then carefully dissected through the underlying tissues using a dissecting scissors taking care to  preserve any cutaneous nerves along the way.  I then entered the retroperitoneal space through this cord or and used my finger to navigate a dilator down to the disc base.  I placed the dilator under navigational guidance and stimulated in all directions confirming that this was a safe place to place the pin.  I then impacted the pin into the disc base.  I then dilated up using sequential dilators, stimulating at each level to make sure there was no irritation to the lumbar plexus.  I then placed a retractor and expanded this to reveal the disc base.  I then obtained AP and lateral imaging confirming appropriate position of the guidepin and the retractor, and impacted the benita under x-ray guidance.  I then placed a AL L retractor and expanded the retractor fully.  I stimulated to confirm there was no underlying nerve, and then incised the annulus using a 15 blade.  I used a Mc to develop the plane of the disc and subperiosteally dissected the disc off of the endplate.  I then pierced the contralateral annulus under fluoroscopic guidance.  I then trialed and found that a size 10 50 mm implant was appropriate for this patient, 18 mm across.  The implant was packed with infuse and cancellous autograft, and I prepared the endplate using a series of curettes.  I then implanted under fluoroscopic guidance.  I obtain confirmatory fluoroscopic imaging, and then removed the retractor gradually ensuring there was no iliopsoas bleeding.  I then turned my attention back to the screws.  These were stimulated and all screws stimulated above a 20.  I placed rods bilaterally, set plugs bilaterally, and final tightened bilaterally.  After breaking off I obtained final AP and lateral fluoroscopic imaging confirming appropriate positioning of the instrumentation.  I then copiously irrigated the wounds.  The fascia was closed using 0 Vicryl suture, the subcuticular layer was closed using a 2-0 Vicryl suture, and the skin was closed  using a 3-0 STRATAFIX suture.  The skin was then covered with a skin glue and a Primapore dressing was applied.  The O-arm tower was atraumatically disimpacted from the iliac crest.  The patient tolerated the procedure without apparent complication.             Condition: Stable     Weight bearing status: Weight bearing as tolerated     Activity:      Anticoagulation plan:    Plan:      Bandar Solo MD  [facility]  Date:  1/3/2024  4:36 PM   Activity as tolerated  Patient may move about with assist as indicated or with supervision    Ambulation and mechanical prophylaxis.    The patient will be transferred to floor once she clears PACU criteria.  She will mobilize for DVT prophylaxis and receive postoperative Ancef for antibiotic prophylaxis.  The patient will be on strict no heavy lifting, twisting, or bending requirements for the next 3 months.  She will follow-up with me in 2 weeks in clinic for wound check.

## 2024-01-03 NOTE — ANESTHESIA CARE TRANSFER NOTE
Patient: Dara Lamb    Procedure: Procedure(s):  LUMBAR 3 - LUMBAR 4 EXTREME LATERAL LUMBAR INTERBODY FUSION WITH LUMBAR 3 - LUMBAR 4 POSTERIOR LUMBAR FUSION WITH STEALTH NAVIGATION       Diagnosis: Back pain [M54.9]  Diagnosis Additional Information: No value filed.    Anesthesia Type:   General     Note:    Oropharynx: oropharynx clear of all foreign objects and spontaneously breathing  Level of Consciousness: drowsy  Oxygen Supplementation: face mask  Level of Supplemental Oxygen (L/min / FiO2): 6  Independent Airway: airway patency satisfactory and stable  Dentition: dentition unchanged  Vital Signs Stable: post-procedure vital signs reviewed and stable  Report to RN Given: handoff report given  Patient transferred to: PACU    Handoff Report: Identifed the Patient, Identified the Reponsible Provider, Reviewed the pertinent medical history, Discussed the surgical course, Reviewed Intra-OP anesthesia mangement and issues during anesthesia, Set expectations for post-procedure period and Allowed opportunity for questions and acknowledgement of understanding      Vitals:  Vitals Value Taken Time   /68 01/03/24 1728   Temp     Pulse 87 01/03/24 1729   Resp 17 01/03/24 1729   SpO2 92 % 01/03/24 1729   Vitals shown include unfiled device data.    Electronically Signed By: STEPHANIE Nowak CRNA  January 3, 2024  5:31 PM

## 2024-01-03 NOTE — PHARMACY-ADMISSION MEDICATION HISTORY
Pharmacist Admission Medication History    Admission medication history is complete. The information provided in this note is only as accurate as the sources available at the time of the update.    Information Source(s): Patient and CareEverywhere/SureScripts via in-person    Pertinent Information:      Changes made to PTA medication list:  Added: None  Deleted: None  Changed: Flexeril    Medication Affordability:  Not including over the counter (OTC) medications, was there a time in the past 3 months when you did not take your medications as prescribed because of cost?: No    Allergies reviewed with patient and updates made in EHR: yes    Medication History Completed By: Abraham Matthews [facility] 1/3/2024 11:26 AM    PTA Med List   Medication Sig Last Dose    atomoxetine (STRATTERA) 100 MG capsule Take 100 mg by mouth every morning 1/3/2024 at am    atomoxetine (STRATTERA) 60 MG capsule Take 60 mg by mouth At Bedtime 1/2/2024 at pm    cyclobenzaprine (FLEXERIL) 10 MG tablet Take 10 mg by mouth at bedtime 1/2/2024 at pm    diltiazem ER COATED BEADS (CARDIZEM CD/CARTIA XT) 240 MG 24 hr capsule Take 1 capsule (240 mg) by mouth daily **Labs due for further refills [phone]** 1/3/2024 at am    estradiol (VAGIFEM) 10 MCG TABS vaginal tablet Place 10 mcg vaginally twice a week Past Week    eszopiclone (LUNESTA) 3 MG tablet TAKE 1 TABLET BY MOUTH AT BEDTIME AS NEEDED FOR SLEEP prn at prn    fish oil-omega-3 fatty acids 1000 MG capsule Take 1 capsule by mouth daily 12/20/2023    gabapentin (NEURONTIN) 300 MG capsule Take 300 mg by mouth at bedtime 1/2/2024 at pm    irbesartan (AVAPRO) 150 MG tablet Take 1 tablet (150 mg) by mouth At Bedtime Please complete ordered labs for further refills. 1/2/2024 at pm    latanoprost (XALATAN) 0.005 % ophthalmic solution Place 1 drop into both eyes At Bedtime 1/2/2024 at pm - has with    levothyroxine (SYNTHROID/LEVOTHROID) 150 MCG tablet Take 150 mcg by mouth daily 1/3/2024 at am     lurasidone (LATUDA) 80 MG TABS tablet Take 80 mg by mouth at bedtime 1/2/2024 at pm    montelukast (SINGULAIR) 10 MG tablet Take 1 tablet (10 mg) by mouth At Bedtime *Due for office visit 8/2023, please call clinic to schedule* 1/2/2024 at pm    multivitamin, therapeutic with minerals (THERA-VIT-M) TABS Take 1 tablet by mouth daily 12/27/2023 at am    OYSCO 500 + D 500-200 MG-UNIT tablet TAKE 2 TABLETS BY MOUTH TWICE A DAY 1/3/2024 at am    pantoprazole (PROTONIX) 40 MG EC tablet Take 1 tablet (40 mg) by mouth 2 times daily 1/3/2024 at am    polyethylene glycol (MIRALAX) powder Take 17 g by mouth daily 1/2/2024 at am    QUEtiapine (SEROQUEL XR) 150 MG TB24 24 hr tablet Take 150 mg by mouth at bedtime 1/2/2024 at pm    rosuvastatin (CRESTOR) 40 MG tablet Take 1 tablet (40 mg) by mouth daily Please complete ordered labs for further refills. 1/2/2024 at pm    semaglutide (OZEMPIC) 2 MG/3ML pen Inject 0.5 mg Subcutaneous every 7 days 12/22/2023 at am    solifenacin (VESICARE) 5 MG tablet Take 1 tablet (5 mg) by mouth daily * SCHEDULE CLINIC APPT.* 1/3/2024 at am    timolol maleate (TIMOPTIC) 0.5 % ophthalmic solution PLACE 1 DROP INTO LEFT EYE ONCE DAILY. 1/2/2024 at am - has with    vitamin  B complex with vitamin C (STRESS TAB) TABS Take 1 tablet by mouth daily 1/3/2024 at am    vortioxetine (TRINTELLIX) 10 MG tablet Take 10 mg by mouth daily 30mg total dose 1/3/2024 at am    vortioxetine (TRINTELLIX) 20 MG tablet Take 20 mg by mouth daily 30mg total dose 1/3/2024 at am

## 2024-01-03 NOTE — ANESTHESIA PREPROCEDURE EVALUATION
Anesthesia Pre-Procedure Evaluation    Patient: Dara Lamb   MRN: [id number] : 1955        Procedure : Procedure(s):  LUMBAR 3 - LUMBAR 4 EXTREME LATERAL LUMBAR INTERBODY FUSION WITH LUMBAR 3 - LUMBAR 4 POSTERIOR LUMBAR DECOMPRESSION AND FUSION WITH STEALTH NAVIGATION          Past Medical History:   Diagnosis Date    Abnormal Pap smear and cervical HPV (human papillomavirus)     DARIO, conization 1990    Acne     Alcohol abuse     Bipolar affective disorder (H)     Breast cancer in female (H) 2011    invasive ductal ca, stage IIB, poorly differentiated,  ER and LA positive, lumpectomy,     Cerebral aneurysm, nonruptured     ophthalmic branch of left ICA. .  Presented with syncope    Closed Colles' fracture     dermatofibrosarcoma     1998    Diverticulitis of colon (without mention of hemorrhage)(562.11)     2007    Esophageal reflux     Essential hypertension, benign     Glaucoma     Lumbago     right spinal canal cyst through L1-2 neuroforamin with large benign root sleeve cyst, [facility]    Major depressive disorder     Major depressive disorder, recurrent episode, unspecified     Suicide attempt 10/2000    Menopause     2004    Migraine without aura, without mention of intractable migraine without mention of status migrainosus     Neoplasm of unspecified nature of breast     3.9 cm Stage 2B IDCa ER/LA +, HER2- 5/3/11right breast    Obstructive sleep apnea (adult) (pediatric)     Other and unspecified hyperlipidemia     Postmenopausal bleeding      biopsy negative    Primary thyroid papillary carcinoma (H)     Rectal bleeding     [facility]    Restless legs syndrome (RLS)     Spinal stenosis, lumbar region, without neurogenic claudication      L4-5    Thyroid cancer (H) 2012    Stage OMAR, papillary ca, s/p thyroidectomy and mod left neck dissection      Past Surgical History:   Procedure Laterality Date      SECTION      1978, 87, 97    COLECTOMY LEFT      sigmoid colon , diverticulitis, colostomy 2008 reanastomosis    COLONOSCOPY      2009    LAMINECTOMY LUMBAR ONE LEVEL      for cauda equina syndrome, 2006    LAPAROTOMY EXPLORATORY      , for ?bowel obstx r/o pelvic infection    LUMPECTOMY BREAST      right breast 5/3/11    MAMMOPLASTY REDUCTION BILATERAL      11    THYROIDECTOMY      TONSILLECTOMY      10/26/03    WRIST SURGERY        Allergies   Allergen Reactions    Contrast Dye Shortness Of Breath    Dust Mite Extract Other (See Comments)     Sneezing    Dust Mites Other (See Comments)     Sneezing around dogs and cats    Hydrochlorothiazide     Iodinated Contrast Media     Latex     Latex     Lipitor [Atorvastatin Calcium]      myalgias    Oxycodone-Acetaminophen Nausea and Vomiting    Sulfa Antibiotics      arthralgias    Vicodin [Hydrocodone-Acetaminophen]      Nausea, vomiting    Wound Dressing Adhesive      PN: And type of tape with adhesive other than silk tape      Social History     Tobacco Use    Smoking status: Never    Smokeless tobacco: Never   Substance Use Topics    Alcohol use: No      Wt Readings from Last 1 Encounters:   24 98.4 kg (217 lb)        Anesthesia Evaluation   Pt has had prior anesthetic.     No history of anesthetic complications       ROS/MED HX  ENT/Pulmonary:     (+) sleep apnea,                                       Neurologic:  - neg neurologic ROS     Cardiovascular: Comment: Hx of coronary vasospasm    (+)  hypertension- -  CAD -  - -                                      METS/Exercise Tolerance:     Hematologic:  - neg hematologic  ROS     Musculoskeletal:       GI/Hepatic:     (+) GERD,                   Renal/Genitourinary:  - neg Renal ROS     Endo: Comment: Hx of breast cancer right side, no BP IV right arm    (+)          thyroid problem,     Obesity,       Psychiatric/Substance Use:       Infectious Disease:       Malignancy:       Other:    "         Physical Exam    Airway        Mallampati: II   TM distance: > 3 FB   Neck ROM: full   Mouth opening: > 3 cm    Respiratory Devices and Support         Dental       (+) Minor Abnormalities - some fillings, tiny chips      Cardiovascular          Rhythm and rate: regular and normal     Pulmonary           breath sounds clear to auscultation           OUTSIDE LABS:  CBC:   Lab Results   Component Value Date    WBC 5.9 12/21/2023    WBC 3.8 (L) 07/29/2020    HGB 12.4 12/21/2023    HGB 13.8 07/29/2020    HCT 38.4 12/21/2023    HCT 42.3 07/29/2020     12/21/2023     07/29/2020     BMP:   Lab Results   Component Value Date     12/21/2023     08/03/2022    POTASSIUM 4.1 12/21/2023    POTASSIUM 5.0 08/03/2022    CHLORIDE 104 12/21/2023    CHLORIDE 108 08/03/2022    CO2 27 12/21/2023    CO2 28 08/03/2022    BUN 10.4 12/21/2023    BUN 11 08/03/2022    CR 0.79 12/21/2023    CR 0.56 08/03/2022    GLC 96 12/21/2023     (H) 08/03/2022     COAGS:   Lab Results   Component Value Date    PTT 27 02/20/2013    INR 1.07 02/20/2013     POC: No results found for: \"BGM\", \"HCG\", \"HCGS\"  HEPATIC:   Lab Results   Component Value Date    ALBUMIN 4.0 08/03/2022    PROTTOTAL 8.3 08/03/2022    ALT 29 08/03/2022    AST 45 08/03/2022    ALKPHOS 114 08/03/2022    BILITOTAL 0.6 08/03/2022     OTHER:   Lab Results   Component Value Date    A1C 5.8 (H) 12/21/2023    RUBEN 9.1 12/21/2023    TSH 0.14 (L) 08/03/2022    T4 1.09 08/03/2022       Anesthesia Plan    ASA Status:  3    NPO Status:  NPO Appropriate    Anesthesia Type: General.     - Airway: ETT   Induction: Intravenous.           Consents    Anesthesia Plan(s) and associated risks, benefits, and realistic alternatives discussed. Questions answered and patient/representative(s) expressed understanding.     - Discussed: Risks, Benefits and Alternatives for the PROCEDURE were discussed     - Discussed with:  Patient            Postoperative Care    Pain " "management: IV analgesics, Oral pain medications.   PONV prophylaxis: Ondansetron (or other 5HT-3), Dexamethasone or Solumedrol     Comments:               Karl Wu MD    I have reviewed the pertinent notes and labs in the chart from the past 30 days and (re)examined the patient.  Any updates or changes from those notes are reflected in this note.              # Obesity: Estimated body mass index is 39.69 kg/m  as calculated from the following:    Height as of this encounter: 1.575 m (5' 2\").    Weight as of this encounter: 98.4 kg (217 lb).      "

## 2024-01-04 ENCOUNTER — APPOINTMENT (OUTPATIENT)
Dept: PHYSICAL THERAPY | Facility: CLINIC | Age: 69
End: 2024-01-04
Attending: ORTHOPAEDIC SURGERY
Payer: COMMERCIAL

## 2024-01-04 ENCOUNTER — APPOINTMENT (OUTPATIENT)
Dept: OCCUPATIONAL THERAPY | Facility: CLINIC | Age: 69
End: 2024-01-04
Attending: ORTHOPAEDIC SURGERY
Payer: COMMERCIAL

## 2024-01-04 PROBLEM — E66.812 CLASS 2 OBESITY DUE TO EXCESS CALORIES WITH BODY MASS INDEX (BMI) OF 36.0 TO 36.9 IN ADULT, UNSPECIFIED WHETHER SERIOUS COMORBIDITY PRESENT: Status: ACTIVE | Noted: 2017-10-06

## 2024-01-04 PROBLEM — E66.09 CLASS 2 OBESITY DUE TO EXCESS CALORIES WITH BODY MASS INDEX (BMI) OF 36.0 TO 36.9 IN ADULT, UNSPECIFIED WHETHER SERIOUS COMORBIDITY PRESENT: Status: ACTIVE | Noted: 2017-10-06

## 2024-01-04 LAB
ALBUMIN SERPL BCG-MCNC: 4.2 G/DL (ref 3.5–5.2)
ALP SERPL-CCNC: 78 U/L (ref 40–150)
ALT SERPL W P-5'-P-CCNC: 15 U/L (ref 0–50)
ANION GAP SERPL CALCULATED.3IONS-SCNC: 7 MMOL/L (ref 7–15)
AST SERPL W P-5'-P-CCNC: 27 U/L (ref 0–45)
BILIRUB SERPL-MCNC: 0.4 MG/DL
BUN SERPL-MCNC: 9.5 MG/DL (ref 8–23)
CALCIUM SERPL-MCNC: 8.6 MG/DL (ref 8.8–10.2)
CHLORIDE SERPL-SCNC: 104 MMOL/L (ref 98–107)
CREAT SERPL-MCNC: 0.59 MG/DL (ref 0.51–0.95)
DEPRECATED HCO3 PLAS-SCNC: 27 MMOL/L (ref 22–29)
EGFRCR SERPLBLD CKD-EPI 2021: >90 ML/MIN/1.73M2
ERYTHROCYTE [DISTWIDTH] IN BLOOD BY AUTOMATED COUNT: 14.5 % (ref 10–15)
GLUCOSE BLDC GLUCOMTR-MCNC: 130 MG/DL (ref 70–99)
GLUCOSE BLDC GLUCOMTR-MCNC: 143 MG/DL (ref 70–99)
GLUCOSE SERPL-MCNC: 189 MG/DL (ref 70–99)
HCT VFR BLD AUTO: 33 % (ref 35–47)
HGB BLD-MCNC: 11 G/DL (ref 11.7–15.7)
MCH RBC QN AUTO: 30.1 PG (ref 26.5–33)
MCHC RBC AUTO-ENTMCNC: 33.3 G/DL (ref 31.5–36.5)
MCV RBC AUTO: 90 FL (ref 78–100)
PLATELET # BLD AUTO: 168 10E3/UL (ref 150–450)
POTASSIUM SERPL-SCNC: 3.9 MMOL/L (ref 3.4–5.3)
PROT SERPL-MCNC: 6.7 G/DL (ref 6.4–8.3)
RBC # BLD AUTO: 3.66 10E6/UL (ref 3.8–5.2)
SODIUM SERPL-SCNC: 138 MMOL/L (ref 135–145)
WBC # BLD AUTO: 10.9 10E3/UL (ref 4–11)

## 2024-01-04 PROCEDURE — 85027 COMPLETE CBC AUTOMATED: CPT | Performed by: ORTHOPAEDIC SURGERY

## 2024-01-04 PROCEDURE — 97116 GAIT TRAINING THERAPY: CPT | Mod: GP

## 2024-01-04 PROCEDURE — 97110 THERAPEUTIC EXERCISES: CPT | Mod: GP

## 2024-01-04 PROCEDURE — 97166 OT EVAL MOD COMPLEX 45 MIN: CPT | Mod: GO

## 2024-01-04 PROCEDURE — 97535 SELF CARE MNGMENT TRAINING: CPT | Mod: GO

## 2024-01-04 PROCEDURE — 36415 COLL VENOUS BLD VENIPUNCTURE: CPT | Performed by: ORTHOPAEDIC SURGERY

## 2024-01-04 PROCEDURE — 250N000013 HC RX MED GY IP 250 OP 250 PS 637: Performed by: ORTHOPAEDIC SURGERY

## 2024-01-04 PROCEDURE — 97161 PT EVAL LOW COMPLEX 20 MIN: CPT | Mod: GP

## 2024-01-04 PROCEDURE — 82962 GLUCOSE BLOOD TEST: CPT

## 2024-01-04 PROCEDURE — 80053 COMPREHEN METABOLIC PANEL: CPT | Performed by: HOSPITALIST

## 2024-01-04 PROCEDURE — 250N000011 HC RX IP 250 OP 636: Performed by: ORTHOPAEDIC SURGERY

## 2024-01-04 PROCEDURE — 250N000013 HC RX MED GY IP 250 OP 250 PS 637: Performed by: HOSPITALIST

## 2024-01-04 PROCEDURE — 99214 OFFICE O/P EST MOD 30 MIN: CPT | Performed by: HOSPITALIST

## 2024-01-04 RX ORDER — HYDROMORPHONE HYDROCHLORIDE 4 MG/1
2 TABLET ORAL EVERY 4 HOURS PRN
Status: DISCONTINUED | OUTPATIENT
Start: 2024-01-04 | End: 2024-01-05 | Stop reason: HOSPADM

## 2024-01-04 RX ORDER — HYDROMORPHONE HYDROCHLORIDE 4 MG/1
4 TABLET ORAL EVERY 4 HOURS PRN
Status: DISCONTINUED | OUTPATIENT
Start: 2024-01-04 | End: 2024-01-05 | Stop reason: HOSPADM

## 2024-01-04 RX ORDER — NICOTINE POLACRILEX 4 MG
15-30 LOZENGE BUCCAL
Status: DISCONTINUED | OUTPATIENT
Start: 2024-01-04 | End: 2024-01-05 | Stop reason: HOSPADM

## 2024-01-04 RX ORDER — CALCIUM CARBONATE 500 MG/1
500 TABLET, CHEWABLE ORAL ONCE
Status: COMPLETED | OUTPATIENT
Start: 2024-01-04 | End: 2024-01-04

## 2024-01-04 RX ORDER — DEXTROSE MONOHYDRATE 25 G/50ML
25-50 INJECTION, SOLUTION INTRAVENOUS
Status: DISCONTINUED | OUTPATIENT
Start: 2024-01-04 | End: 2024-01-05 | Stop reason: HOSPADM

## 2024-01-04 RX ADMIN — HYDROMORPHONE HYDROCHLORIDE 4 MG: 4 TABLET ORAL at 17:18

## 2024-01-04 RX ADMIN — HYDROMORPHONE HYDROCHLORIDE 4 MG: 2 TABLET ORAL at 02:33

## 2024-01-04 RX ADMIN — HYDROMORPHONE HYDROCHLORIDE 0.4 MG: 0.2 INJECTION, SOLUTION INTRAMUSCULAR; INTRAVENOUS; SUBCUTANEOUS at 04:51

## 2024-01-04 RX ADMIN — ACETAMINOPHEN 975 MG: 325 TABLET ORAL at 21:16

## 2024-01-04 RX ADMIN — ROSUVASTATIN CALCIUM 40 MG: 10 TABLET, FILM COATED ORAL at 21:10

## 2024-01-04 RX ADMIN — ATOMOXETINE 100 MG: 25 CAPSULE ORAL at 07:49

## 2024-01-04 RX ADMIN — ACETAMINOPHEN 975 MG: 325 TABLET ORAL at 06:34

## 2024-01-04 RX ADMIN — THERA TABS 1 TABLET: TAB at 07:48

## 2024-01-04 RX ADMIN — HYDROMORPHONE HYDROCHLORIDE 4 MG: 4 TABLET ORAL at 21:16

## 2024-01-04 RX ADMIN — LATANOPROST 1 DROP: 50 SOLUTION/ DROPS OPHTHALMIC at 21:08

## 2024-01-04 RX ADMIN — ACETAMINOPHEN 975 MG: 325 TABLET ORAL at 13:30

## 2024-01-04 RX ADMIN — LATANOPROST 1 DROP: 50 SOLUTION/ DROPS OPHTHALMIC at 01:00

## 2024-01-04 RX ADMIN — IRBESARTAN 150 MG: 150 TABLET ORAL at 21:09

## 2024-01-04 RX ADMIN — POLYETHYLENE GLYCOL 3350 17 G: 17 POWDER, FOR SOLUTION ORAL at 07:50

## 2024-01-04 RX ADMIN — HYDROMORPHONE HYDROCHLORIDE 0.4 MG: 0.2 INJECTION, SOLUTION INTRAMUSCULAR; INTRAVENOUS; SUBCUTANEOUS at 09:41

## 2024-01-04 RX ADMIN — Medication 2 TABLET: at 19:45

## 2024-01-04 RX ADMIN — DILTIAZEM HYDROCHLORIDE 240 MG: 120 CAPSULE, EXTENDED RELEASE ORAL at 07:48

## 2024-01-04 RX ADMIN — PANTOPRAZOLE SODIUM 40 MG: 40 TABLET, DELAYED RELEASE ORAL at 06:34

## 2024-01-04 RX ADMIN — PANTOPRAZOLE SODIUM 40 MG: 40 TABLET, DELAYED RELEASE ORAL at 16:21

## 2024-01-04 RX ADMIN — QUETIAPINE FUMARATE 150 MG: 50 TABLET, EXTENDED RELEASE ORAL at 21:10

## 2024-01-04 RX ADMIN — HYDROMORPHONE HYDROCHLORIDE 0.4 MG: 0.2 INJECTION, SOLUTION INTRAMUSCULAR; INTRAVENOUS; SUBCUTANEOUS at 00:38

## 2024-01-04 RX ADMIN — SENNOSIDES AND DOCUSATE SODIUM 1 TABLET: 8.6; 5 TABLET ORAL at 19:45

## 2024-01-04 RX ADMIN — HYDROMORPHONE HYDROCHLORIDE 4 MG: 2 TABLET ORAL at 07:48

## 2024-01-04 RX ADMIN — Medication 1 TABLET: at 07:48

## 2024-01-04 RX ADMIN — CALCIUM CARBONATE (ANTACID) CHEW TAB 500 MG 500 MG: 500 CHEW TAB at 09:40

## 2024-01-04 RX ADMIN — MONTELUKAST SODIUM 10 MG: 10 TABLET, COATED ORAL at 21:10

## 2024-01-04 RX ADMIN — CEFAZOLIN SODIUM 2 G: 2 INJECTION, SOLUTION INTRAVENOUS at 06:35

## 2024-01-04 RX ADMIN — VORTIOXETINE 20 MG: 10 TABLET, FILM COATED ORAL at 07:50

## 2024-01-04 RX ADMIN — HYDROMORPHONE HYDROCHLORIDE 4 MG: 4 TABLET ORAL at 13:24

## 2024-01-04 RX ADMIN — Medication 2 TABLET: at 07:48

## 2024-01-04 RX ADMIN — LURASIDONE HYDROCHLORIDE 80 MG: 20 TABLET, COATED ORAL at 21:09

## 2024-01-04 RX ADMIN — SENNOSIDES AND DOCUSATE SODIUM 1 TABLET: 8.6; 5 TABLET ORAL at 07:48

## 2024-01-04 RX ADMIN — ATOMOXETINE 60 MG: 40 CAPSULE ORAL at 21:09

## 2024-01-04 RX ADMIN — ESZOPICLONE 3 MG: 1 TABLET, FILM COATED ORAL at 20:05

## 2024-01-04 ASSESSMENT — ACTIVITIES OF DAILY LIVING (ADL)
ADLS_ACUITY_SCORE: 22
ADLS_ACUITY_SCORE: 23
ADLS_ACUITY_SCORE: 22
ADLS_ACUITY_SCORE: 23
ADLS_ACUITY_SCORE: 22
ADLS_ACUITY_SCORE: 23
ADLS_ACUITY_SCORE: 22
ADLS_ACUITY_SCORE: 22

## 2024-01-04 NOTE — PROGRESS NOTES
01/04/24 0900   Appointment Info   Signing Clinician's Name / Credentials (PT) Oscar Claudio, PT, DPT   Quick Adds   Quick Adds Certification   Living Environment   People in Home significant other   Current Living Arrangements house   Home Accessibility stairs within home   Number of Stairs, Within Home, Primary greater than 10 stairs   Self-Care   Usual Activity Tolerance good   Current Activity Tolerance moderate   Equipment Currently Used at Home none   Fall history within last six months no   General Information   Onset of Illness/Injury or Date of Surgery 01/03/24   Referring Physician Dr. Solo   Patient/Family Therapy Goals Statement (PT) Improve overall mobility   Pertinent History of Current Problem (include personal factors and/or comorbidities that impact the POC) s/p lumbar fusion   Existing Precautions/Restrictions weight bearing   Weight-Bearing Status - LLE weight-bearing as tolerated   Weight-Bearing Status - RLE weight-bearing as tolerated   Range of Motion (ROM)   ROM Comment WFL, decreased lumbar due to precautions   Strength (Manual Muscle Testing)   Strength Comments WFL   Transfers   Transfers sit-stand transfer   Sit-Stand Transfer   Sit-Stand Dolores (Transfers) contact guard   Assistive Device (Sit-Stand Transfers) walker, front-wheeled   Gait/Stairs (Locomotion)   Dolores Level (Gait) contact guard   Assistive Device (Gait) walker, front-wheeled   Distance in Feet (Gait) 10'   Pattern (Gait) step-through   Deviations/Abnormal Patterns (Gait) parish decreased;stride length decreased   Clinical Impression   Criteria for Skilled Therapeutic Intervention Yes, treatment indicated   PT Diagnosis (PT) impaired functional mobility   Influenced by the following impairments pain, weakness   Functional limitations due to impairments gait, stairs, transfers   Clinical Presentation (PT Evaluation Complexity) stable   Clinical Presentation Rationale pt presents as medically diagnosed    Clinical Decision Making (Complexity) low complexity   Planned Therapy Interventions (PT) gait training;home exercise program;patient/family education;ROM (range of motion);stair training;strengthening;transfer training   Risk & Benefits of therapy have been explained care plan/treatment goals reviewed;patient   PT Total Evaluation Time   PT Eval, Low Complexity Minutes (62754) 10   Therapy Certification   Start of care date 01/04/24   Certification date from 01/04/24   Certification date to 02/04/24   Medical Diagnosis s/p lumbar fusion   Physical Therapy Goals   PT Frequency 2x/day   PT Predicted Duration/Target Date for Goal Attainment 01/06/24   PT Goals PT Goal 1;Gait;Transfers;Stairs   PT: Transfers Modified independent;Sit to/from stand;Assistive device   PT: Gait Modified independent;Rolling walker;150 feet   PT: Stairs Supervision/stand-by assist;8 stairs;Rail on both sides   PT: Goal 1 Independent with written HEP for LE strengthening   Interventions   Interventions Quick Adds Therapeutic Activity;Gait Training   Therapeutic Activity   Therapeutic Activities: dynamic activities to improve functional performance Minutes (40368) 5   Symptoms Noted During/After Treatment None   Treatment Detail/Skilled Intervention Sit to/from stand, cueing for safe hand placement, Mod I following education, increased time for set up, no concerns in this area.   Gait Training   Gait Training Minutes (03274) 10   Symptoms Noted During/After Treatment (Gait Training) fatigue;increased pain   Treatment Detail/Skilled Intervention Pt amb moderately well in the halls CGA with FWW, moving slowly but no LOB or adverse s/s, reviewed spinal precautions, encouraged mobility with nursing as able. Educated on role of therapies, AD use.   Distance in Feet 100'   [place] Level (Gait Training) contact guard   Physical Assistance Level (Gait Training) verbal cues;supervision   Weight Bearing (Gait Training) weight-bearing as tolerated    Assistive Device (Gait Training) rolling walker   Pattern Analysis (Gait Training) swing-through gait   Gait Analysis Deviations decreased parish;decreased step length   Impairments (Gait Analysis/Training) pain;strength decreased   PT Discharge Planning   PT Plan Increase amb, stairs, spinal HEP   PT Discharge Recommendation (DC Rec) (S)  home with assist   PT Rationale for DC Rec Pt transferring and amb well, has SO at home, needs to increase amb and complete stairs.   PT Brief overview of current status SBA transfers and amb 100' with FWW   PT Equipment Needed at Discharge walker, rolling   [facility]  OUTPATIENT PHYSICAL THERAPY EVALUATION  PLAN OF TREATMENT FOR OUTPATIENT REHABILITATION  (COMPLETE FOR INITIAL CLAIMS ONLY)  Patient's Last Name, First Name, M.I.  YOB: 1955  Dara Lamb                        Provider's Name  [facility] Medical Record No.  [id number]                             Onset Date:  01/03/24   Start of Care Date:  01/04/24   Type:     _X_PT   ___OT   ___SLP Medical Diagnosis:  s/p lumbar fusion              PT Diagnosis:  impaired functional mobility Visits from SOC:  1     See note for plan of treatment, functional goals and certification details    I CERTIFY THE NEED FOR THESE SERVICES FURNISHED UNDER        THIS PLAN OF TREATMENT AND WHILE UNDER MY CARE     (Physician co-signature of this document indicates review and certification of the therapy plan).

## 2024-01-04 NOTE — PROGRESS NOTES
"[facility] Progress Note      Post-operative Day: 1 Day Post-Op    Procedure(s):  LUMBAR 3 - LUMBAR 4 EXTREME LATERAL LUMBAR INTERBODY FUSION WITH LUMBAR 3 - LUMBAR 4 POSTERIOR LUMBAR FUSION WITH STEALTH NAVIGATION  Subjective: Patient seen resting in bed. Appears A&O answering questions appropriately  Endorses incisional and back pain, improved with PO analgesics.     Chest pain, SOB:  No  Nausea, vomiting: No   Lightheadedness, dizziness: No     Objective:  Blood pressure 135/72, pulse 103, temperature 98.3  F (36.8  C), temperature source Oral, resp. rate 18, height 1.575 m (5' 2\"), weight 98.4 kg (217 lb), SpO2 92%, not currently breastfeeding.    Patient Vitals for the past 24 hrs:   BP Temp Temp src Pulse Resp SpO2 Height Weight   01/04/24 0700 135/72 98.3  F (36.8  C) Oral 103 18 92 % -- --   01/04/24 0353 131/67 97.5  F (36.4  C) Oral 104 18 95 % -- --   01/03/24 2300 (!) 141/80 98  F (36.7  C) Oral 92 18 92 % -- --   01/03/24 2200 (!) 152/71 97.8  F (36.6  C) Oral 93 18 92 % -- --   01/03/24 2159 138/78 98  F (36.7  C) Oral 92 18 94 % -- --   01/03/24 2130 (!) 148/81 -- -- 86 16 96 % -- --   01/03/24 2100 137/68 98  F (36.7  C) Temporal 89 18 98 % -- --   01/03/24 2030 118/68 -- -- 86 20 97 % -- --   01/03/24 2027 118/68 -- -- 87 15 98 % -- --   01/03/24 2010 131/73 -- -- 90 14 96 % -- --   01/03/24 2000 132/78 98  F (36.7  C) Temporal 88 16 95 % -- --   01/03/24 1950 136/80 -- -- 88 16 95 % -- --   01/03/24 1940 119/73 -- -- 89 13 96 % -- --   01/03/24 1930 129/78 -- -- 89 13 95 % -- --   01/03/24 1920 134/79 -- -- 89 18 95 % -- --   01/03/24 1910 120/77 -- -- 92 15 94 % -- --   01/03/24 1900 130/78 -- -- 91 18 96 % -- --   01/03/24 1850 130/77 -- -- 89 17 95 % -- --   01/03/24 1840 131/78 -- -- 89 16 95 % -- --   01/03/24 1830 127/73 -- -- 88 16 94 % -- --   01/03/24 1820 127/74 -- -- 93 19 94 % -- --   01/03/24 1810 (!) 144/78 -- -- 92 14 97 % -- --   01/03/24 1800 (!) 140/80 -- -- 88 16 96 " "% -- --   01/03/24 1750 (!) 141/89 -- -- 88 17 95 % -- --   01/03/24 1740 120/67 -- -- 90 15 95 % -- --   01/03/24 1730 118/66 -- -- 86 16 92 % -- --   01/03/24 1728 125/68 98.3  F (36.8  C) Tympanic 89 18 92 % -- --   01/03/24 1107 116/70 98.4  F (36.9  C) Temporal 85 19 96 % 1.575 m (5' 2\") 98.4 kg (217 lb)       Wt Readings from Last 4 Encounters:   01/03/24 98.4 kg (217 lb)   12/21/23 99.6 kg (219 lb 8 oz)   09/13/23 99.6 kg (219 lb 8 oz)   08/03/22 98.3 kg (216 lb 11.2 oz)       Gen: Seen resting in bed, A&O x 3. NAD.   Wound status: C/D/I with no surrounding erythema or drainage.   Circulation, motion and sensation: Dorsiflexion/plantarflexion intact and equal bilaterally; distal  extremity sensation is intact and equal bilaterally. Foot and toes are warm and well perfused.    Calf tenderness: calves are soft and non-tender bilaterally none    Pertinent Labs   Lab Results: personally reviewed.     Recent Labs   Lab Test 01/04/24  0559 12/21/23  0907 08/03/22  1206 07/29/20  1039   WBC 10.9 5.9  --  3.8*   HGB 11.0* 12.4  --  13.8   HCT 33.0* 38.4  --  42.3   MCV 90 93  --  93    233  --  201    141 140 139       Plan:   Continue current cares and rehabilitation.  Anticoagulation protocol: Mechanical and mobilization   Hospital medicine following, greatly appreciate assistance.   Pain medications:  scopainmedication: dilaudid and tylenol  Weight bearing status:  WBAT, no bending, twisting or lifting over 5 lbs for 3 months   Disposition:  Anticipate home 1/5/24 pending therapies clearance from medical team and adequate pain control.              Report completed by:  STEPHANIE Cage CNP  Date: 1/4/2024  Time: 7:27 AM    "

## 2024-01-04 NOTE — CONSULTS
MEDICINE CONSULT    Physician requesting consult: Dr. Solo  Reason for consult: appreciate medical comanagament     Assessment and Plan:    Dara Lamb is a 68 year old old female who  has a past medical history of Abnormal Pap smear and cervical HPV (human papillomavirus), Acne, Alcohol abuse, Bipolar affective disorder (H), Breast cancer in female (H) (05/03/2011), Cerebral aneurysm, nonruptured, Closed Colles' fracture, dermatofibrosarcoma, Diverticulitis of colon (without mention of hemorrhage)(562.11), Esophageal reflux, Essential hypertension, benign, Glaucoma, Lumbago, Major depressive disorder, Major depressive disorder, recurrent episode, unspecified, Menopause, Migraine without aura, without mention of intractable migraine without mention of status migrainosus, Neoplasm of unspecified nature of breast, Obstructive sleep apnea (adult) (pediatric), Other and unspecified hyperlipidemia, Postmenopausal bleeding, Primary thyroid papillary carcinoma (H), Rectal bleeding, Restless legs syndrome (RLS), Spinal stenosis, lumbar region, without neurogenic claudication, and Thyroid cancer (H) (05/12/2012).     Confusion  Patient initially ANO x 1 with inappropriate answering  Discussed case with anesthesiologist who reviewed intraoperative signs  There were a few hypotensive moments and reportedly pressor medications were utilized  Otherwise no clinical findings of CVA or anoxic brain injury  On repeat assessment, now ANO x 2 and seems to be clearing  Anticipate she had a prolonged recovery from anesthesia given the duration of surgery, underlying psychiatric comorbidities, and age  Check CMP, POCT BG  If status deteriorates, obtain stat intracranial imaging    ADD  Atomoxetine held tonight, resume tomorrow    Essential hypertension  Diltiazem 240mg daily  Irbesartan 150mg    T2DM  Semaglutide held, requested POCT BG per RN    Dyslipidemia  Rosuvastatin 40mg    MDD  Trintellix 20mg  Quetiapine 150mg  Latuda  80mg  Monitor for serotonin syndrome    Insomnia  Home lunesta 3mg    Status-post Procedure(s):  LUMBAR 3 - LUMBAR 4 EXTREME LATERAL LUMBAR INTERBODY FUSION WITH LUMBAR 3 - LUMBAR 4 POSTERIOR LUMBAR FUSION WITH STEALTH NAVIGATIONperformed on 24  Complications: none  EBL: 50cc  Hemoglobin   Date Value Ref Range Status   2023 12.4 11.7 - 15.7 g/dL Final   2020 13.8 11.7 - 15.7 g/dL Final     Creatinine   Date Value Ref Range Status   2023 0.79 0.51 - 0.95 mg/dL Final   2020 0.64 0.52 - 1.04 mg/dL Final       PPx: defer to surgery, will comment if medicine input requested     History:    Dara Lamb is a 68 year old old female is unable to provide meaningful history. She's afebrile, BP stable, weaning NC O2.  She denies pain, chest pain, abdominal pain, and participate somewhat focused neurologic exam.    Surgical History  She  has a past surgical history that includes Colectomy left; Lumpectomy breast; Mammoplasty reduction bilateral;  section; Laparotomy exploratory; Laminectomy lumbar one level; Tonsillectomy; Wrist surgery; Colonoscopy; and Thyroidectomy.     Past Surgical History:   Procedure Laterality Date     SECTION      , 87, 97    COLECTOMY LEFT      sigmoid colon , diverticulitis, colostomy  reanastomosis    COLONOSCOPY      2009    LAMINECTOMY LUMBAR ONE LEVEL      for cauda equina syndrome,     LAPAROTOMY EXPLORATORY      , for ?bowel obstx r/o pelvic infection    LUMPECTOMY BREAST      right breast 5/3/11    MAMMOPLASTY REDUCTION BILATERAL      11    THYROIDECTOMY      TONSILLECTOMY      10/26/03    WRIST SURGERY         Allergies  Allergies   Allergen Reactions    Contrast Dye Shortness Of Breath    Dust Mite Extract Other (See Comments)     Sneezing    Dust Mites Other (See Comments)     Sneezing around dogs and cats    Hydrochlorothiazide     Iodinated Contrast Media     Latex     Latex     Lipitor [Atorvastatin Calcium]       myalgias    Oxycodone-Acetaminophen Nausea and Vomiting    Sulfa Antibiotics      arthralgias    Vicodin [Hydrocodone-Acetaminophen]      Nausea, vomiting    Wound Dressing Adhesive      PN: And type of tape with adhesive other than silk tape     Social History  Reviewed, and she  reports that she has never smoked. She has never used smokeless tobacco. She reports that she does not drink alcohol and does not use drugs.  Social History     Tobacco Use    Smoking status: Never    Smokeless tobacco: Never   Substance Use Topics    Alcohol use: No       Family History  Reviewed, and family history includes Alcohol/Drug in her brother and father; Asthma in her sister and son; Blood Disease in her maternal grandmother; Breast Cancer in her mother; C.A.D. in her brother; Cancer in her father, maternal grandmother, and sister; Cerebrovascular Disease in her father; Esophageal Cancer in her brother; Heart Disease in her brother and mother; Hypertension in her mother; Lipids in her father; Prostate Cancer in her father; Psychotic Disorder in her brother, sister, son, and another family member.    Review of Systems  All pertinent positives and negatives reviewed in History.  All other 12 point review of systems reviewed and negative.      Objective:    Physical Exam  Constitutional: Appears nad, confused in PACU in bed, Body mass index is 39.69 kg/m .  Pupils symmetric  Grossly intact neurologic exam of extremities  Regular rate and rhythm, no murmur  Lungs clear to auscultation bilaterally  Cranial nerves III through XII assessed with limitations without focal finding  Psych: Unable to assess affect, alert and oriented ×2    Cardiographics  ECG: NSR, elevated artifact on baseline     Imaging  XR Surgery JUSTINO  Fluoro G/T 5 Min  This exam was marked as non-reportable because it will not be read by a   radiologist or a [place] non-radiologist provider.  XR Surgery JUSTINO  Fluoro G/T 5 Min  This exam was marked as  non-reportable because it will not be read by a   radiologist or a [place] non-radiologist provider.       Lab Review   Lab on 01/02/2024   Component Date Value    Color Urine 01/02/2024 Dark Yellow (A)     Appearance Urine 01/02/2024 Turbid (A)     Glucose Urine 01/02/2024 Negative     Bilirubin Urine 01/02/2024 Negative     Ketones Urine 01/02/2024 Negative     Specific Gravity Urine 01/02/2024 1.028     Blood Urine 01/02/2024 Negative     pH Urine 01/02/2024 5.5     Protein Albumin Urine 01/02/2024 30 (A)     Urobilinogen Urine 01/02/2024 <2.0     Nitrite Urine 01/02/2024 Negative     Leukocyte Esterase Urine 01/02/2024 25 Harjeet/uL (A)     Mucus Urine 01/02/2024 Present (A)     RBC Urine 01/02/2024 1     WBC Urine 01/02/2024 7 (H)     Squamous Epithelials Uri* 01/02/2024 19 (H)     Hyaline Casts Urine 01/02/2024 1    Office Visit on 12/21/2023   Component Date Value    WBC Count 12/21/2023 5.9     RBC Count 12/21/2023 4.14     Hemoglobin 12/21/2023 12.4     Hematocrit 12/21/2023 38.4     MCV 12/21/2023 93     MCH 12/21/2023 30.0     MCHC 12/21/2023 32.3     RDW 12/21/2023 14.3     Platelet Count 12/21/2023 233     Sodium 12/21/2023 141     Potassium 12/21/2023 4.1     Chloride 12/21/2023 104     Carbon Dioxide (CO2) 12/21/2023 27     Anion Gap 12/21/2023 10     Urea Nitrogen 12/21/2023 10.4     Creatinine 12/21/2023 0.79     GFR Estimate 12/21/2023 81     Calcium 12/21/2023 9.1     Glucose 12/21/2023 96     Hemoglobin A1C 12/21/2023 5.8 (H)     Color Urine 12/21/2023 Yellow     Appearance Urine 12/21/2023 Cloudy (A)     Glucose Urine 12/21/2023 Negative     Bilirubin Urine 12/21/2023 Negative     Ketones Urine 12/21/2023 Trace (A)     Specific Gravity Urine 12/21/2023 1.025     Blood Urine 12/21/2023 Small (A)     pH Urine 12/21/2023 6.0     Protein Albumin Urine 12/21/2023 30 (A)     Urobilinogen Urine 12/21/2023 0.2     Nitrite Urine 12/21/2023 Positive (A)     Leukocyte Esterase Urine 12/21/2023 Moderate (A)      Bacteria Urine 12/21/2023 Moderate (A)     RBC Urine 12/21/2023 0-2     WBC Urine 12/21/2023 >100 (A)     Squamous Epithelials Uri* 12/21/2023 Few (A)     WBC Clumps Urine 12/21/2023 Present (A)     Culture 12/21/2023 >100,000 CFU/mL Klebsiella oxytoca (A)     Culture 12/21/2023 50,000-100,000 CFU/mL Klebsiella oxytoca (A)        Appreciate the opportunity to participate in the care of Dara Lamb, please feel free to contact for any questions or concerns     Oscar Brasher MD, MPH  [facility]  Office # [phone]

## 2024-01-04 NOTE — PROVIDER NOTIFICATION
Pt has been in PACU since 1726, slow to awaken.  Following commands at this time, however has not been oriented to place, time, and situation.  Pt alert, VSS, Hospitalist Dr. Brasher at bedside to do exam.   Connecting with anesthesia to see if any further testing warranted.

## 2024-01-04 NOTE — PLAN OF CARE
Report taken from PACU nurse. admitted to unit at 2200.    Patient vital signs are at baseline: Yes  Patient able to ambulate as they were prior to admission or with assist devices provided by therapies during their stay:  No,  Reason:  pt has not yet ambulated.  Patient MUST void prior to discharge:  Yes  Patient able to tolerate oral intake:  Yes  Pain has adequate pain control using Oral analgesics:  Yes  Does patient have an identified :  Yes  Has goal D/C date and time been discussed with patient:  Yes    Pt has garcia in place. Pt is alert and oriented. Forgetful and slow to respond.     Krissy Lee RN on 1/3/2024 at 11:55 PM

## 2024-01-04 NOTE — PLAN OF CARE
Patient vital signs are at baseline: Yes  Patient able to ambulate as they were prior to admission or with assist devices provided by therapies during their stay:  No,  Reason:  some numbness in BLE, feels weak; able to stand bedside and shuffle side to side.  Patient MUST void prior to discharge:  No,  Reason:  Estrada in place  Patient able to tolerate oral intake:  Yes  Pain has adequate pain control using Oral analgesics:  No,  Reason:  requiring IV dilaudid   Does patient have an identified :  Yes  Has goal D/C date and time been discussed with patient:  Yes     A/O. VSS on RA. Pain managed with PRN PO dilaudid and IV dilaudid. NS infusing at 125 mL/hr. Estrada patent.     Goal Outcome Evaluation:    Problem: Adult Inpatient Plan of Care  Goal: Optimal Comfort and Wellbeing  Outcome: Progressing     Problem: Spinal Surgery  Goal: Optimal Functional Ability  Outcome: Progressing  Intervention: Optimize Functional Status  Recent Flowsheet Documentation  Taken 1/4/2024 0230 by Loulou Garcia, RN  Activity Management:   dorsiflexion/plantar flexion performed   sitting, edge of bed   standing at bedside   stepped/marched in place  Taken 1/4/2024 0102 by Loulou Garcia, RN  Activity Management: activity adjusted per tolerance  Positioning/Transfer Devices:   pillows   in use     Problem: Spinal Surgery  Goal: Optimal Pain Control and Function  Outcome: Progressing

## 2024-01-04 NOTE — PROVIDER NOTIFICATION
Anesthesia Provider MD Mendoza Martin at bedside.  Does full Neuro exam, NIH stroke scale scoring.  Pt more awake, answering questions appropriately, no deficits noted.  Confusion attributed to IV Dilaudid and effects of anesthesia.  Pt taking PO fluids now.  C/o of only mild pain.

## 2024-01-04 NOTE — ANESTHESIA POSTPROCEDURE EVALUATION
Patient: Dara Lamb    Procedure: Procedure(s):  LUMBAR 3 - LUMBAR 4 EXTREME LATERAL LUMBAR INTERBODY FUSION WITH LUMBAR 3 - LUMBAR 4 POSTERIOR LUMBAR FUSION WITH STEALTH NAVIGATION       Anesthesia Type:  General    Note:  Disposition: Admission   Postop Pain Control: Uneventful            Sign Out: Well controlled pain   PONV: No   Neuro/Psych: Uneventful            Sign Out: Acceptable/Baseline neuro status   Airway/Respiratory: Uneventful            Sign Out: Acceptable/Baseline resp. status   CV/Hemodynamics: Uneventful            Sign Out: Acceptable CV status; No obvious hypovolemia; No obvious fluid overload   Other NRE: NONE   DID A NON-ROUTINE EVENT OCCUR? No    Event details/Postop Comments:  Around 1925, spoke to the admitted hospitalized about the patient. When he assessed the patient near 1900, she was only oriented to self. He asked about any concern for anoxic brain injury and I explained there were a few lower blood pressures during the case, but she was appropriately treated with phenylephrine, ephedrine and a phenylephrine infusion. When I was able to assess the patient 10 minutes later, she was oriented to self, place, month, day of week and year. We carried out the NIH stroke scale and the patient scored 0. The hospitalist plans to observe the patient and if clinical situation changes will get brain imaging if needed. As the clinical picture is now, minor change in orientation (did not know date) is likely due to anesthetic/pain medication.            Last vitals:  Vitals Value Taken Time   /78 01/03/24 1840   Temp 36.8  C (98.3  F) 01/03/24 1728   Pulse 90 01/03/24 1843   Resp 18 01/03/24 1843   SpO2 96 % 01/03/24 1843   Vitals shown include unfiled device data.    Electronically Signed By: Mendoza Martin MD  January 3, 2024  6:45 PM  
88

## 2024-01-04 NOTE — PROGRESS NOTES
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 Solo, 50cc EBL and no reported intraoperative complications.  She was significantly confused coming out of surgery.  1/4/24 -confusion resolved.  Had mild anemia, lightheadedness with standing    Assessment & Plan   Hypocalcemia  Tums with outpatient follow-up    Anemia  Preoperative hemoglobin 12.4, postoperative 11  Lightheaded with standing, advised precautions patient who verbalized understanding  Encouraged to check bowel movement for melena or blood loss  No pallor on exam    Sinus tachycardia  Likely combination postop recovery, pain.  No associated murmur, shortness of breath, palpitations.  No reported history atrial fibrillation.  Rhythm is regular.  Advise monitoring this morning.    Type 2 diabetes  Resume semaglutide at discharge  AM glucose 189, received 10 mg dexamethasone  Mild sliding scale insulin ordered    ADD  Continue atomoxetine    Major depressive disorder  Has not been inpatient for depression in years  Continue Trintellix, quetiapine, Latuda.      Discharge: If she is able to pass her therapy milestones and appropriate for discharge from orthopedic standpoint, would recheck heart rate and assess for lightheadedness with standing.  If normalized, would agree with discharge.  DVT Prophylaxis:  Defer to primary service  Code Status: Full Code  Diet: Regular Diet Adult  Discharge Instruction - Regular Diet Adult  Cardiac monitor: None  Body mass index is 39.69 kg/m .    Interval History  Afebrile, hemodynamically stable, mildly tachycardic.  Stable on room air.  Receiving IV and p.o. Dilaudid.  She had a  "little lightheadedness getting up out of bed transitioning to the chair.  No syncope.  She is asymptomatic at rest.  Denies palpitations, chest pain, shortness of breath.  She has \"aching\" in her surgical site.  Denies abdominal pain.  Has tolerated some p.o.      Last 24H PRN:     HYDROmorphone (DILAUDID) injection 0.2 mg **OR** HYDROmorphone (DILAUDID) injection 0.4 mg, 0.4 mg at 01/04/24 0451    HYDROmorphone (DILAUDID) tablet 2 mg, 2 mg at 01/03/24 2231 **OR** HYDROmorphone (DILAUDID) tablet 4 mg, 4 mg at 01/04/24 0748    Wt Readings from Last 5 Encounters:   01/03/24 98.4 kg (217 lb)   12/21/23 99.6 kg (219 lb 8 oz)   09/13/23 99.6 kg (219 lb 8 oz)   08/03/22 98.3 kg (216 lb 11.2 oz)   01/04/22 95.7 kg (211 lb)       Oscar Brasher MD, MPH  Hospitalist,[facility]: [facility]  Phone: #[phone]    Medications:   Personally Reviewed.  Medications    sodium chloride 125 mL/hr at 01/04/24 0102      acetaminophen  975 mg Oral Q8H    atomoxetine  100 mg Oral QAM    atomoxetine  60 mg Oral At Bedtime    calcium carbonate-vitamin D  2 tablet Oral BID    [Held by provider] cyclobenzaprine  10 mg Oral At Bedtime    diltiazem ER COATED BEADS  240 mg Oral Daily    [START ON 1/8/2024] estradiol  10 mcg Vaginal Once per day on Monday Thursday    [Held by provider] gabapentin  300 mg Oral At Bedtime    irbesartan  150 mg Oral At Bedtime    latanoprost  1 drop Both Eyes At Bedtime    levothyroxine  150 mcg Oral Daily    lurasidone  80 mg Oral At Bedtime    montelukast  10 mg Oral At Bedtime    multivitamin w/minerals  1 tablet Oral Daily    multivitamin, therapeutic  1 tablet Oral Daily    pantoprazole  40 mg Oral BID AC    polyethylene glycol  17 g Oral Daily    QUEtiapine  150 mg Oral At Bedtime    rosuvastatin  40 mg Oral QPM    senna-docusate  1 tablet Oral BID    timolol maleate  1 drop Left Eye Daily    vortioxetine  20 mg Oral Daily       Clinically Significant Risk Factors Present on " "Admission                  # Hypertension: Noted on problem list      # Obesity: Estimated body mass index is 39.69 kg/m  as calculated from the following:    Height as of this encounter: 1.575 m (5' 2\").    Weight as of this encounter: 98.4 kg (217 lb).              "

## 2024-01-04 NOTE — PROGRESS NOTES
"   01/04/24 0735   Appointment Info   Signing Clinician's Name / Credentials (OT) Bonita Barney, DURGAR/L, CLT   Rehab Comments (OT) OT luis daniel   Quick Adds   Quick Adds Certification   Living Environment   People in Home significant other  (\"Emil\")   Current Living Arrangements house   Home Accessibility stairs within home   Self-Care   Usual Activity Tolerance good   Current Activity Tolerance moderate   Equipment Currently Used at Home   (walk-in shower; sink near toilet)   Activity/Exercise/Self-Care Comment Pt IND w/ ADLs at baseline   General Information   Onset of Illness/Injury or Date of Surgery 01/03/24   Referring Physician Dr. Solo   Patient/Family Therapy Goal Statement (OT) go home   Additional Occupational Profile Info/Pertinent History of Current Problem spine surgery   Existing Precautions/Restrictions spinal;lifting  (no > 5lbs)   Cognitive Status Examination   Orientation Status orientation to person, place and time   Affect/Mental Status (Cognitive) WNL   Visual Perception   Visual Impairment/Limitations WFL   Sensory   Sensory Quick Adds sensation intact   Pain Assessment   Patient Currently in Pain No   Posture   Posture not impaired   Range of Motion Comprehensive   General Range of Motion no range of motion deficits identified   Strength Comprehensive (MMT)   General Manual Muscle Testing (MMT) Assessment no strength deficits identified   Muscle Tone Assessment   Muscle Tone Quick Adds No deficits were identified   Coordination   Upper Extremity Coordination No deficits were identified   Bed Mobility   Bed Mobility scooting/bridging;supine-sit;sit-supine   Transfers   Transfers bed-chair transfer;sit-stand transfer;toilet transfer;shower transfer   Transfer Comments SBA-CGA   Transfer Skill: Bed to Chair/Chair to Bed   Transfer Comments CGA   Sit-Stand Transfer   Sit/Stand Transfer Comments CGA   Shower Transfer   Shower Transfer Comments CGA   Toilet Transfer   Toilet Transfer Comments " CGA   Balance   Balance Comments decreased   Activities of Daily Living   BADL Assessment/Intervention upper body dressing;bathing;lower body dressing;toileting   Bathing Assessment/Intervention   [place] Level (Bathing) minimum assist (75% patient effort)   Upper Body Dressing Assessment/Training   [place] Level (Upper Body Dressing) supervision   Lower Body Dressing Assessment/Training   [place] Level (Lower Body Dressing) maximum assist (25% patient effort)   Toileting   [place] Level (Toileting) adjust/manage clothing;toileting skills;supervision   Clinical Impression   Criteria for Skilled Therapeutic Interventions Met (OT) Yes, treatment indicated   OT Diagnosis decreased ADL independence/safety   Influenced by the following impairments spine surgery   OT Problem List-Impairments impacting ADL activity tolerance impaired;mobility;pain;post-surgical precautions   Assessment of Occupational Performance 3-5 Performance Deficits   Identified Performance Deficits dressing, bed mobility, functional transfers, bathing, toileting   Planned Therapy Interventions (OT) ADL retraining;bed mobility training;transfer training   Clinical Decision Making Complexity (OT) detailed assessment/moderate complexity   Risk & Benefits of therapy have been explained evaluation/treatment results reviewed;patient   OT Total Evaluation Time   OT Eval, Moderate Complexity Minutes (60362) 10   Therapy Certification   Medical Diagnosis spine fusion   Start of Care Date 01/04/24   Certification date from 01/04/24   Certification date to 02/04/24   OT Goals   Therapy Frequency (OT) 2 times/day   OT Predicted Duration/Target Date for Goal Attainment 01/06/24   OT Goals Lower Body Dressing;Bed Mobility;Toilet Transfer/Toileting   OT: Lower Body Dressing Minimal assist   OT: Bed Mobility Minimal assist   OT: Toilet Transfer/Toileting Supervision/stand-by assist   Interventions   Interventions Quick Adds Self-Care/Home  Management   Self-Care/Home Management   Self-Care/Home Mgmt/ADL, Compensatory, Meal Prep Minutes (87905) 30   Symptoms Noted During/After Treatment (Meal Preparation/Planning Training) increased pain   Treatment Detail/Skilled Intervention Pt edu on spinal px - pt verbalized understanding.  Pt instructed on compensatory strategies for LE dressing - completed Mod I while seated EOB using AE and figure 4 position. Pt edu on safe RTS transfer - completed  Mod I.  Edu handout given on ADL w/in spinal px.  Pt ended session in recliner. All tasks needed incr time/effort second to decreased recall/cog? and pain.   OT Discharge Planning   OT Plan bed/shower/toilet transfer, review prec for spine   OT Discharge Recommendation (DC Rec) (S)  home with assist   OT Rationale for DC Rec per pt has good support at home   OT Brief overview of current status Josefina/SBA for BADL   OT Equipment Needed at Discharge reacher;other (see comments)  (sock aide--pt stated would purchase from [employer])   Total Session Time   Timed Code Treatment Minutes 30   Total Session Time (sum of timed and untimed services) 40    [facility]  OUTPATIENT OCCUPATIONAL THERAPY  EVALUATION  PLAN OF TREATMENT FOR OUTPATIENT REHABILITATION  (COMPLETE FOR INITIAL CLAIMS ONLY)  Patient's Last Name, First Name, M.I.  YOB: 1955  Dara Lamb                          Provider's Name  [facility] Medical Record No.  [id number]                             Onset Date:  01/03/24   Start of Care Date:  01/04/24   Type:     ___PT   _X_OT   ___SLP Medical Diagnosis:  spine fusion                    OT Diagnosis:  decreased ADL independence/safety Visits from SOC:  1     See note for plan of treatment, functional goals and certification details    I CERTIFY THE NEED FOR THESE SERVICES FURNISHED UNDER        THIS PLAN OF TREATMENT AND WHILE UNDER MY CARE     (Physician co-signature of this  document indicates review and certification of the therapy plan).

## 2024-01-04 NOTE — CARE PLAN
Patient vital signs are at baseline: Yes  Patient able to ambulate as they were prior to admission or with assist devices provided by therapies during their stay:  No,  Reason:  not yet OOB  Patient MUST void prior to discharge:  No,  Reason:  garcia in place  Patient able to tolerate oral intake:  Yes  Pain has adequate pain control using Oral analgesics:  No,  Reason:  requiring IV dilaudid  Does patient have an identified :  Yes  Has goal D/C date and time been discussed with patient:  Yes

## 2024-01-04 NOTE — PLAN OF CARE
Goal Outcome Evaluation:  Pt is alert & pleasant & able to make needs known. She has ambulated in the hallway with therapy and done well. She has voided & ambulated in the hallway with therapy. Following her void she was bladder scanned and had a low result. Pt has so-far rated her pain in the 5 to 6 range. The PO Dilaudid seems to be modestly effective. She may need a different medication to go home on if it doesn't get her more relief. Probable discharge tomorrow.

## 2024-01-05 ENCOUNTER — APPOINTMENT (OUTPATIENT)
Dept: PHYSICAL THERAPY | Facility: CLINIC | Age: 69
End: 2024-01-05
Attending: ORTHOPAEDIC SURGERY
Payer: COMMERCIAL

## 2024-01-05 ENCOUNTER — APPOINTMENT (OUTPATIENT)
Dept: OCCUPATIONAL THERAPY | Facility: CLINIC | Age: 69
End: 2024-01-05
Attending: ORTHOPAEDIC SURGERY
Payer: COMMERCIAL

## 2024-01-05 ENCOUNTER — APPOINTMENT (OUTPATIENT)
Dept: RADIOLOGY | Facility: CLINIC | Age: 69
End: 2024-01-05
Attending: ORTHOPAEDIC SURGERY
Payer: COMMERCIAL

## 2024-01-05 VITALS
HEIGHT: 62 IN | WEIGHT: 217 LBS | HEART RATE: 91 BPM | BODY MASS INDEX: 39.93 KG/M2 | OXYGEN SATURATION: 94 % | DIASTOLIC BLOOD PRESSURE: 79 MMHG | TEMPERATURE: 98.4 F | SYSTOLIC BLOOD PRESSURE: 119 MMHG | RESPIRATION RATE: 18 BRPM

## 2024-01-05 LAB
GLUCOSE BLDC GLUCOMTR-MCNC: 135 MG/DL (ref 70–99)
GLUCOSE BLDC GLUCOMTR-MCNC: 188 MG/DL (ref 70–99)

## 2024-01-05 PROCEDURE — 97116 GAIT TRAINING THERAPY: CPT | Mod: GP

## 2024-01-05 PROCEDURE — 999N000065 XR LUMBAR SPINE 2/3 VIEWS

## 2024-01-05 PROCEDURE — 250N000013 HC RX MED GY IP 250 OP 250 PS 637: Performed by: ORTHOPAEDIC SURGERY

## 2024-01-05 PROCEDURE — 250N000012 HC RX MED GY IP 250 OP 636 PS 637: Performed by: HOSPITALIST

## 2024-01-05 PROCEDURE — 82962 GLUCOSE BLOOD TEST: CPT

## 2024-01-05 PROCEDURE — 97530 THERAPEUTIC ACTIVITIES: CPT | Mod: GP

## 2024-01-05 PROCEDURE — 250N000013 HC RX MED GY IP 250 OP 250 PS 637: Performed by: HOSPITALIST

## 2024-01-05 PROCEDURE — 36416 COLLJ CAPILLARY BLOOD SPEC: CPT | Performed by: ORTHOPAEDIC SURGERY

## 2024-01-05 PROCEDURE — 97535 SELF CARE MNGMENT TRAINING: CPT | Mod: GO

## 2024-01-05 PROCEDURE — 99214 OFFICE O/P EST MOD 30 MIN: CPT | Performed by: HOSPITALIST

## 2024-01-05 RX ORDER — ACETAMINOPHEN 325 MG/1
650 TABLET ORAL EVERY 4 HOURS PRN
Qty: 60 TABLET | Refills: 1 | Status: SHIPPED | OUTPATIENT
Start: 2024-01-06 | End: 2024-01-13

## 2024-01-05 RX ORDER — HYDROMORPHONE HYDROCHLORIDE 4 MG/1
2-4 TABLET ORAL EVERY 4 HOURS PRN
Qty: 30 TABLET | Refills: 0 | Status: ON HOLD | OUTPATIENT
Start: 2024-01-05 | End: 2024-01-15

## 2024-01-05 RX ORDER — AMOXICILLIN 250 MG
1 CAPSULE ORAL 2 TIMES DAILY
Qty: 20 TABLET | Refills: 0 | Status: ON HOLD | OUTPATIENT
Start: 2024-01-05 | End: 2024-01-15

## 2024-01-05 RX ADMIN — SENNOSIDES AND DOCUSATE SODIUM 1 TABLET: 8.6; 5 TABLET ORAL at 08:08

## 2024-01-05 RX ADMIN — ACETAMINOPHEN 975 MG: 325 TABLET ORAL at 05:44

## 2024-01-05 RX ADMIN — HYDROMORPHONE HYDROCHLORIDE 2 MG: 4 TABLET ORAL at 04:10

## 2024-01-05 RX ADMIN — DILTIAZEM HYDROCHLORIDE 240 MG: 120 CAPSULE, EXTENDED RELEASE ORAL at 08:08

## 2024-01-05 RX ADMIN — INSULIN ASPART 1 UNITS: 100 INJECTION, SOLUTION INTRAVENOUS; SUBCUTANEOUS at 06:48

## 2024-01-05 RX ADMIN — POLYETHYLENE GLYCOL 3350 17 G: 17 POWDER, FOR SOLUTION ORAL at 08:08

## 2024-01-05 RX ADMIN — VORTIOXETINE 20 MG: 10 TABLET, FILM COATED ORAL at 08:07

## 2024-01-05 RX ADMIN — LEVOTHYROXINE SODIUM 150 MCG: 0.15 TABLET ORAL at 05:44

## 2024-01-05 RX ADMIN — Medication 1 TABLET: at 08:08

## 2024-01-05 RX ADMIN — PANTOPRAZOLE SODIUM 40 MG: 40 TABLET, DELAYED RELEASE ORAL at 06:51

## 2024-01-05 RX ADMIN — ATOMOXETINE 100 MG: 25 CAPSULE ORAL at 08:08

## 2024-01-05 RX ADMIN — HYDROMORPHONE HYDROCHLORIDE 2 MG: 4 TABLET ORAL at 08:08

## 2024-01-05 RX ADMIN — HYDROMORPHONE HYDROCHLORIDE 2 MG: 4 TABLET ORAL at 00:56

## 2024-01-05 RX ADMIN — THERA TABS 1 TABLET: TAB at 08:08

## 2024-01-05 RX ADMIN — Medication 2 TABLET: at 08:08

## 2024-01-05 ASSESSMENT — ACTIVITIES OF DAILY LIVING (ADL)
ADLS_ACUITY_SCORE: 23
ADLS_ACUITY_SCORE: 22
ADLS_ACUITY_SCORE: 23

## 2024-01-05 NOTE — PROGRESS NOTES
"[facility] Orthopaedics Progress Note      Post-operative Day: 2 Days Post-Op    Procedure(s):  LUMBAR 3 - LUMBAR 4 EXTREME LATERAL LUMBAR INTERBODY FUSION WITH LUMBAR 3 - LUMBAR 4 POSTERIOR LUMBAR FUSION WITH STEALTH NAVIGATION      Subjective:    The patient is doing well this morning.  Pain under better control.  Ambulated yesterday.  Feels better than before surgery from leg pain standpoint.    Pain: moderate      Objective:  Blood pressure 123/71, pulse 88, temperature 98.4  F (36.9  C), temperature source Oral, resp. rate 18, height 1.575 m (5' 2\"), weight 98.4 kg (217 lb), SpO2 94%, not currently breastfeeding.    Patient Vitals for the past 24 hrs:   BP Temp Temp src Pulse Resp SpO2   01/05/24 0412 123/71 -- -- 88 18 94 %   01/04/24 2358 95/51 98.4  F (36.9  C) Oral 87 17 92 %   01/04/24 1543 123/68 98.3  F (36.8  C) Oral 99 18 93 %   01/04/24 0700 135/72 98.3  F (36.8  C) Oral 103 18 92 %       Wt Readings from Last 4 Encounters:   01/03/24 98.4 kg (217 lb)   12/21/23 99.6 kg (219 lb 8 oz)   09/13/23 99.6 kg (219 lb 8 oz)   08/03/22 98.3 kg (216 lb 11.2 oz)       Output by Drain (mL) 01/03/24 0700 - 01/03/24 1459 01/03/24 1500 - 01/03/24 2259 01/03/24 2300 - 01/04/24 0659 01/04/24 0700 - 01/04/24 1459 01/04/24 1500 - 01/04/24 2259 01/04/24 2300 - 01/05/24 0648   Patient has no LDAs of requested type attached.        Motor function, sensation, and circulation intact   Yes  Wound status: incisions are clean dry and intact. Yes    Pertinent Labs   Lab Results: personally reviewed.     Recent Labs   Lab Test 01/04/24  0559 12/21/23  0907 08/03/22  1206 07/29/20  1039   WBC 10.9 5.9  --  3.8*   HGB 11.0* 12.4  --  13.8   HCT 33.0* 38.4  --  42.3   MCV 90 93  --  93    233  --  201    141 140 139       Plan:             Anticoagulation protocol: Mechanical            Pain medications:  scopainmedication: dilaudid and tylenol            Weight bearing status:  WBAT, no heavy lifting, twisting, or " bending            Brace: LSO when out of the            X-rays: Upright x-rays when able            Disposition: Likely home later today            Continue cares and rehabilitation     Report completed by:  Bandar Solo MD  Date: 1/5/2024  Time: 6:48 AM

## 2024-01-05 NOTE — PROGRESS NOTES
Discharge Rn notified RN assigned to pt that CBC clotted, pt has had multiple attempts. Patient last hgb after surgery was stable. Discharge orders are placed.     RN assigned to pt notified TCU of the CBC unable to be drawn. Discharge RN educated patient that she will be notified if pt has to come in earlier for follow up to get a CBC. Patient understood and stated she is ready to discharge,  is outside.     IV removed. Pt understood discharge teaching with  as evidence by nodding and asking questions.

## 2024-01-05 NOTE — PLAN OF CARE
Goal Outcome Evaluation:      Plan of Care Reviewed With: patient, spouse    Overall Patient Progress: improvingOverall Patient Progress: improving         Patient is ready to discharge home, educated  on dressing change steps and how to do so, no further questions or concerns.

## 2024-01-05 NOTE — PLAN OF CARE
Patient vital signs are at baseline: Yes  Patient able to ambulate as they were prior to admission or with assist devices provided by therapies during their stay:  Yes  Patient MUST void prior to discharge:  Yes  Patient able to tolerate oral intake:  Yes  Pain has adequate pain control using Oral analgesics:  Yes  Does patient have an identified :  Yes  Has goal D/C date and time been discussed with patient:  Yes    Pt passed three post void residuals. ACHS checks initiated. 130 dinner Blood sugar. No correction needed. Bedtime , no correction needed.    Iv saline locked.   Left lateral back dressing change completed due to copious serosanguinous drainage saturation.      Krissy Lee RN on 1/4/2024 at 10:11 PM

## 2024-01-05 NOTE — UTILIZATION REVIEW
Concurrent stay review; Secondary Review Determination     Under the authority of the Utilization Management Committee, the utilization review process indicated a secondary review on Dara Lamb.  The review outcome is based on review of the medical records, discussions with staff, and applying clinical experience noted on the date of the review.        (x) Outpatient Status Appropriate - Concurrent stay review    RATIONALE FOR DETERMINATION   68 yr old female presented 1/3/24 for L3-4 lateral lumbar interbody fusion with posterior lumbar fusion L3-4 in addition.  POD#0 had some confusion which had cleared by POD#1.  Some mild tachycardia 103 improved after receiving home meds (diltiazem) with mild drop in HGB to 11.0 from 12.4 preop.  No orthostasis.  Adequate pain management with PO meds.  No ongoing IVF need.  Medically stable during recovery from POD#1 and planning discharge today on POD#2.    Patient is clinically improving and there is no clear indication to change patient's status to inpatient. The severity of illness, intensity of service provided, expected LOS and risk for adverse outcome make the care appropriate for observation.    The information on this document is developed by the utilization review team in order for the business office to ensure compliance.  This only denotes the appropriateness of proper admission status and does not reflect the quality of care rendered.         The definitions of Inpatient Status and Observation Status used in making the determination above are those provided in the CMS Coverage Manual, Chapter 1 and Chapter 6, section 70.4.      Sincerely,   Yoli Chavez MD  Utilization Review  Physician Advisor  [facility]

## 2024-01-05 NOTE — PROGRESS NOTES
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 Solo, 50cc EBL and no reported intraoperative complications.  She was significantly confused coming out of surgery.  1/4/24 - Confusion resolved.  Had mild anemia, lightheadedness with standing. Mild hypocalcemia noted.  1/5/24 - Doing well, appropriate for discharge from [facility] perspective.    Assessment & Plan   Anemia  Preoperative hemoglobin 12.4, postoperative 11, lightheadedness resolved.  Encouraged to check bowel movement for melena or blood loss  No pallor on exam    Sinus tachycardia  Resolved    Type 2 diabetes  Resume semaglutide at discharge  AM glucose 189, received 10 mg dexamethasone  Mild sliding scale insulin ordered, minimal need    ADD  Continue atomoxetine    Major depressive disorder  Has not been inpatient for depression in years  Continue Trintellix, quetiapine, Latuda.      Discharge: Would agree with today  DVT Prophylaxis:  Defer to primary service  Code Status: Full Code  Diet: Regular Diet Adult  Discharge Instruction - Regular Diet Adult  Cardiac monitor: None  Body mass index is 39.69 kg/m .    Interval History  Vs stable, afebrile, on RA.  She does have last pain in her back compared to preoperative, which she is thankful for.  Denies lightheadedness, chest pain, palpitations.  Tolerating p.o.  Voiding spontaneously and without issue.    Looking well, good affect, alert  NAD in the chair  Regular rate and rhythm without murmur  Lungs clear to auscultation  No palmar pallor  No conjunctival pallor  Bilateral lower extremities neurovascularly intact       Last 24H PRN:     eszopiclone (LUNESTA) tablet 3  mg, 3 mg at 01/04/24 2005    HYDROmorphone (DILAUDID) half-tab 2 mg, 2 mg at 01/05/24 0808 **OR** HYDROmorphone (DILAUDID) half-tab 4 mg, 4 mg at 01/04/24 2116    HYDROmorphone (DILAUDID) injection 0.2 mg **OR** HYDROmorphone (DILAUDID) injection 0.4 mg, 0.4 mg at 01/04/24 0941    Wt Readings from Last 5 Encounters:   01/03/24 98.4 kg (217 lb)   12/21/23 99.6 kg (219 lb 8 oz)   09/13/23 99.6 kg (219 lb 8 oz)   08/03/22 98.3 kg (216 lb 11.2 oz)   01/04/22 95.7 kg (211 lb)       Oscar Brasher MD, MPH  Hospitalist,[facility]: [facility]  Phone: #[phone]    Medications:   Personally Reviewed.  Medications    sodium chloride 125 mL/hr at 01/04/24 0102      acetaminophen  975 mg Oral Q8H    atomoxetine  100 mg Oral QAM    atomoxetine  60 mg Oral At Bedtime    calcium carbonate-vitamin D  2 tablet Oral BID    [Held by provider] cyclobenzaprine  10 mg Oral At Bedtime    diltiazem ER COATED BEADS  240 mg Oral Daily    [START ON 1/8/2024] estradiol  10 mcg Vaginal Once per day on Monday Thursday    [Held by provider] gabapentin  300 mg Oral At Bedtime    insulin aspart  1-3 Units Subcutaneous TID AC    insulin aspart  1-3 Units Subcutaneous At Bedtime    irbesartan  150 mg Oral At Bedtime    latanoprost  1 drop Both Eyes At Bedtime    levothyroxine  150 mcg Oral Daily    lurasidone  80 mg Oral At Bedtime    montelukast  10 mg Oral At Bedtime    multivitamin w/minerals  1 tablet Oral Daily    multivitamin, therapeutic  1 tablet Oral Daily    pantoprazole  40 mg Oral BID AC    polyethylene glycol  17 g Oral Daily    QUEtiapine  150 mg Oral At Bedtime    rosuvastatin  40 mg Oral QPM    senna-docusate  1 tablet Oral BID    timolol maleate  1 drop Left Eye Daily    vortioxetine  20 mg Oral Daily       Clinically Significant Risk Factors Present on Admission                  # Hypertension: Noted on problem list      # Obesity: Estimated body mass index is 39.69 kg/m  as calculated from the  "following:    Height as of this encounter: 1.575 m (5' 2\").    Weight as of this encounter: 98.4 kg (217 lb).              "

## 2024-01-05 NOTE — PROGRESS NOTES
Care Management Discharge Note    Discharge Date: 01/05/2024       Discharge Disposition: Home    Discharge Services: None    Discharge DME: None    Discharge Transportation: family or friend will provide    Education Provided on the Discharge Plan: Yes (AVS per bedside RN)    Persons Notified of Discharge Plans: patient    Patient/Family in Agreement with the Plan: yes        Additional Information:  CM reviewed chart. No CM  needs identified or requested. Thanks!    Esthela Jaeger RN

## 2024-01-05 NOTE — PROGRESS NOTES
Occupational Therapy Discharge Summary    Reason for therapy discharge:    Discharged to home.    Progress towards therapy goal(s). See goals on Care Plan in [facility] electronic health record for goal details.  Goals met    Therapy recommendation(s):    No further therapy is recommended.

## 2024-01-08 ENCOUNTER — TELEPHONE (OUTPATIENT)
Dept: INTERNAL MEDICINE | Facility: CLINIC | Age: 69
End: 2024-01-08
Payer: COMMERCIAL

## 2024-01-08 RX ORDER — SEMAGLUTIDE 1.34 MG/ML
1 INJECTION, SOLUTION SUBCUTANEOUS
Refills: 3 | OUTPATIENT
Start: 2024-01-08

## 2024-01-10 DIAGNOSIS — R05.3 PERSISTENT COUGH: ICD-10-CM

## 2024-01-10 DIAGNOSIS — E66.812 CLASS 2 OBESITY DUE TO EXCESS CALORIES WITH BODY MASS INDEX (BMI) OF 36.0 TO 36.9 IN ADULT, UNSPECIFIED WHETHER SERIOUS COMORBIDITY PRESENT: ICD-10-CM

## 2024-01-10 DIAGNOSIS — E66.09 CLASS 2 OBESITY DUE TO EXCESS CALORIES WITH BODY MASS INDEX (BMI) OF 36.0 TO 36.9 IN ADULT, UNSPECIFIED WHETHER SERIOUS COMORBIDITY PRESENT: ICD-10-CM

## 2024-01-10 DIAGNOSIS — M54.50 LOW BACK PAIN, UNSPECIFIED: ICD-10-CM

## 2024-01-10 DIAGNOSIS — E78.2 MIXED HYPERLIPIDEMIA: ICD-10-CM

## 2024-01-10 DIAGNOSIS — I10 ESSENTIAL HYPERTENSION, BENIGN: ICD-10-CM

## 2024-01-12 ENCOUNTER — HOSPITAL ENCOUNTER (INPATIENT)
Facility: CLINIC | Age: 69
LOS: 3 days | Discharge: HOME | DRG: 920 | End: 2024-01-15
Attending: EMERGENCY MEDICINE | Admitting: INTERNAL MEDICINE
Payer: COMMERCIAL

## 2024-01-12 ENCOUNTER — APPOINTMENT (OUTPATIENT)
Dept: CT IMAGING | Facility: CLINIC | Age: 69
DRG: 920 | End: 2024-01-12
Payer: COMMERCIAL

## 2024-01-12 ENCOUNTER — APPOINTMENT (OUTPATIENT)
Dept: MRI IMAGING | Facility: CLINIC | Age: 69
DRG: 920 | End: 2024-01-12
Payer: COMMERCIAL

## 2024-01-12 DIAGNOSIS — T81.31XA POSTOPERATIVE WOUND DEHISCENCE, INITIAL ENCOUNTER: ICD-10-CM

## 2024-01-12 DIAGNOSIS — M48.061 SPINAL STENOSIS, LUMBAR REGION, WITHOUT NEUROGENIC CLAUDICATION: ICD-10-CM

## 2024-01-12 DIAGNOSIS — M54.16 LUMBAR RADICULOPATHY: Primary | ICD-10-CM

## 2024-01-12 DIAGNOSIS — Z98.1 S/P LUMBAR FUSION: ICD-10-CM

## 2024-01-12 DIAGNOSIS — G89.18 POST-OP PAIN: ICD-10-CM

## 2024-01-12 LAB
ALBUMIN SERPL BCG-MCNC: 3.6 G/DL (ref 3.5–5.2)
ALP SERPL-CCNC: 94 U/L (ref 40–150)
ALT SERPL W P-5'-P-CCNC: 12 U/L (ref 0–50)
ANION GAP SERPL CALCULATED.3IONS-SCNC: 8 MMOL/L (ref 7–15)
AST SERPL W P-5'-P-CCNC: 23 U/L (ref 0–45)
BASOPHILS # BLD AUTO: 0 10E3/UL (ref 0–0.2)
BASOPHILS NFR BLD AUTO: 0 %
BILIRUB SERPL-MCNC: 0.4 MG/DL
BUN SERPL-MCNC: 9.2 MG/DL (ref 8–23)
CALCIUM SERPL-MCNC: 9.2 MG/DL (ref 8.8–10.2)
CHLORIDE SERPL-SCNC: 104 MMOL/L (ref 98–107)
CREAT SERPL-MCNC: 0.8 MG/DL (ref 0.51–0.95)
CRP SERPL-MCNC: 20.2 MG/L
DEPRECATED HCO3 PLAS-SCNC: 28 MMOL/L (ref 22–29)
EGFRCR SERPLBLD CKD-EPI 2021: 80 ML/MIN/1.73M2
EOSINOPHIL # BLD AUTO: 0.4 10E3/UL (ref 0–0.7)
EOSINOPHIL NFR BLD AUTO: 6 %
ERYTHROCYTE [DISTWIDTH] IN BLOOD BY AUTOMATED COUNT: 14.9 % (ref 10–15)
ERYTHROCYTE [SEDIMENTATION RATE] IN BLOOD BY WESTERGREN METHOD: 40 MM/HR (ref 0–30)
GLUCOSE SERPL-MCNC: 89 MG/DL (ref 70–99)
HCT VFR BLD AUTO: 30.8 % (ref 35–47)
HGB BLD-MCNC: 9.8 G/DL (ref 11.7–15.7)
HOLD SPECIMEN: NORMAL
IMM GRANULOCYTES # BLD: 0 10E3/UL
IMM GRANULOCYTES NFR BLD: 0 %
LYMPHOCYTES # BLD AUTO: 2.2 10E3/UL (ref 0.8–5.3)
LYMPHOCYTES NFR BLD AUTO: 30 %
MCH RBC QN AUTO: 29.6 PG (ref 26.5–33)
MCHC RBC AUTO-ENTMCNC: 31.8 G/DL (ref 31.5–36.5)
MCV RBC AUTO: 93 FL (ref 78–100)
MONOCYTES # BLD AUTO: 1 10E3/UL (ref 0–1.3)
MONOCYTES NFR BLD AUTO: 14 %
NEUTROPHILS # BLD AUTO: 3.7 10E3/UL (ref 1.6–8.3)
NEUTROPHILS NFR BLD AUTO: 50 %
NRBC # BLD AUTO: 0 10E3/UL
NRBC BLD AUTO-RTO: 0 /100
PLATELET # BLD AUTO: 253 10E3/UL (ref 150–450)
POTASSIUM SERPL-SCNC: 4.1 MMOL/L (ref 3.4–5.3)
PROT SERPL-MCNC: 6.4 G/DL (ref 6.4–8.3)
RBC # BLD AUTO: 3.31 10E6/UL (ref 3.8–5.2)
SODIUM SERPL-SCNC: 140 MMOL/L (ref 135–145)
WBC # BLD AUTO: 7.3 10E3/UL (ref 4–11)

## 2024-01-12 PROCEDURE — 99285 EMERGENCY DEPT VISIT HI MDM: CPT | Mod: 25

## 2024-01-12 PROCEDURE — 85025 COMPLETE CBC W/AUTO DIFF WBC: CPT

## 2024-01-12 PROCEDURE — 83735 ASSAY OF MAGNESIUM: CPT | Performed by: INTERNAL MEDICINE

## 2024-01-12 PROCEDURE — 72148 MRI LUMBAR SPINE W/O DYE: CPT

## 2024-01-12 PROCEDURE — 96375 TX/PRO/DX INJ NEW DRUG ADDON: CPT

## 2024-01-12 PROCEDURE — 250N000011 HC RX IP 250 OP 636

## 2024-01-12 PROCEDURE — 36415 COLL VENOUS BLD VENIPUNCTURE: CPT | Performed by: EMERGENCY MEDICINE

## 2024-01-12 PROCEDURE — 85652 RBC SED RATE AUTOMATED: CPT

## 2024-01-12 PROCEDURE — 80053 COMPREHEN METABOLIC PANEL: CPT

## 2024-01-12 PROCEDURE — 74176 CT ABD & PELVIS W/O CONTRAST: CPT

## 2024-01-12 PROCEDURE — 99223 1ST HOSP IP/OBS HIGH 75: CPT | Performed by: INTERNAL MEDICINE

## 2024-01-12 PROCEDURE — 84145 PROCALCITONIN (PCT): CPT | Performed by: INTERNAL MEDICINE

## 2024-01-12 PROCEDURE — 96374 THER/PROPH/DIAG INJ IV PUSH: CPT

## 2024-01-12 PROCEDURE — 120N000001 HC R&B MED SURG/OB

## 2024-01-12 PROCEDURE — 258N000003 HC RX IP 258 OP 636: Performed by: INTERNAL MEDICINE

## 2024-01-12 PROCEDURE — 86140 C-REACTIVE PROTEIN: CPT

## 2024-01-12 PROCEDURE — 36415 COLL VENOUS BLD VENIPUNCTURE: CPT

## 2024-01-12 RX ORDER — ONDANSETRON 2 MG/ML
4 INJECTION INTRAMUSCULAR; INTRAVENOUS ONCE
Status: COMPLETED | OUTPATIENT
Start: 2024-01-12 | End: 2024-01-12

## 2024-01-12 RX ORDER — AMOXICILLIN 250 MG
2 CAPSULE ORAL 2 TIMES DAILY
Status: DISCONTINUED | OUTPATIENT
Start: 2024-01-12 | End: 2024-01-15 | Stop reason: HOSPADM

## 2024-01-12 RX ORDER — LIDOCAINE 40 MG/G
CREAM TOPICAL
Status: DISCONTINUED | OUTPATIENT
Start: 2024-01-12 | End: 2024-01-15 | Stop reason: HOSPADM

## 2024-01-12 RX ORDER — HYDROMORPHONE HYDROCHLORIDE 1 MG/ML
0.5 INJECTION, SOLUTION INTRAMUSCULAR; INTRAVENOUS; SUBCUTANEOUS ONCE
Status: COMPLETED | OUTPATIENT
Start: 2024-01-12 | End: 2024-01-12

## 2024-01-12 RX ORDER — SODIUM CHLORIDE 9 MG/ML
INJECTION, SOLUTION INTRAVENOUS CONTINUOUS
Status: DISCONTINUED | OUTPATIENT
Start: 2024-01-12 | End: 2024-01-14

## 2024-01-12 RX ORDER — AMOXICILLIN 250 MG
1 CAPSULE ORAL 2 TIMES DAILY
Status: DISCONTINUED | OUTPATIENT
Start: 2024-01-12 | End: 2024-01-15 | Stop reason: HOSPADM

## 2024-01-12 RX ORDER — CALCIUM CARBONATE 500 MG/1
1000 TABLET, CHEWABLE ORAL 4 TIMES DAILY PRN
Status: DISCONTINUED | OUTPATIENT
Start: 2024-01-12 | End: 2024-01-15 | Stop reason: HOSPADM

## 2024-01-12 RX ORDER — AMOXICILLIN 250 MG
2 CAPSULE ORAL 2 TIMES DAILY PRN
Status: DISCONTINUED | OUTPATIENT
Start: 2024-01-12 | End: 2024-01-14

## 2024-01-12 RX ORDER — POLYETHYLENE GLYCOL 3350 17 G/17G
17 POWDER, FOR SOLUTION ORAL DAILY
Status: DISCONTINUED | OUTPATIENT
Start: 2024-01-13 | End: 2024-01-15 | Stop reason: HOSPADM

## 2024-01-12 RX ORDER — AMOXICILLIN 250 MG
1 CAPSULE ORAL 2 TIMES DAILY PRN
Status: DISCONTINUED | OUTPATIENT
Start: 2024-01-12 | End: 2024-01-14

## 2024-01-12 RX ADMIN — HYDROMORPHONE HYDROCHLORIDE 0.5 MG: 1 INJECTION, SOLUTION INTRAMUSCULAR; INTRAVENOUS; SUBCUTANEOUS at 20:15

## 2024-01-12 RX ADMIN — ONDANSETRON 4 MG: 2 INJECTION INTRAMUSCULAR; INTRAVENOUS at 20:13

## 2024-01-12 RX ADMIN — HYDROMORPHONE HYDROCHLORIDE 0.5 MG: 1 INJECTION, SOLUTION INTRAMUSCULAR; INTRAVENOUS; SUBCUTANEOUS at 23:39

## 2024-01-12 RX ADMIN — SODIUM CHLORIDE: 9 INJECTION, SOLUTION INTRAVENOUS at 23:38

## 2024-01-12 ASSESSMENT — ENCOUNTER SYMPTOMS
HEADACHES: 0
NUMBNESS: 1
FEVER: 1
FREQUENCY: 1
ABDOMINAL PAIN: 1
BACK PAIN: 1
WOUND: 1
DIARRHEA: 0
NAUSEA: 1
CONSTIPATION: 0
VOMITING: 0
CHILLS: 0

## 2024-01-12 ASSESSMENT — ACTIVITIES OF DAILY LIVING (ADL)
ADLS_ACUITY_SCORE: 35
ADLS_ACUITY_SCORE: 35
ADLS_ACUITY_SCORE: 33
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
## 2024-01-12 NOTE — TELEPHONE ENCOUNTER
PA Initiation    Medication: OZEMPIC (1 MG/DOSE) 4 MG/3ML SC SOPN  Insurance Company: [employer] - Phone [phone] Fax [phone]  Pharmacy Filling the Rx: [facility] #[id number] - [place], [place] - [address] AT [facility] & [address]  Filling Pharmacy Phone: [phone]  Filling Pharmacy Fax: [phone]  Start Date: 1/11/2024      
PRIOR AUTHORIZATION DENIED    Medication: OZEMPIC (1 MG/DOSE) 4 MG/3ML SC SOPN    Insurance Company: [employer] - Phone [phone] Fax [phone]    Denial Date: 1/12/2024    Denial Reason(s): Patient is not using to treat type 2 diabetes.     Appeal Information:           
Prior Authorization Retail Medication Request    Medication/Dose: Semaglutide, 1 MG/DOSE, (OZEMPIC) 4 MG/3ML pen      Insurance     As of Date    01/08/2024     Member Information    Name: NADEGE HOLMAN [[id number]]     Number: [id number] Relationship to subscriber: Self   Effective date: 11/1/2016 Termination date: --     Coverage Information    Subscriber name: NADEGE HOLMAN     Subscriber number: [id number] Group number: [id number]   Payer: [employer] [[id number]] Benefit plan: [employer] OPEN ACCESS [[id number]]               Plan Details     Component Group Net Aut Brian Ref Clsfr Level MOOP/Ded Patient Portion Limit Left Benefit Bucket Admission Group   Physician Office Visits    All Procedures --   Copay Table of Coverage: [employer]* [[id number]] No copay/coinsurance table information available -- Copay Table of Plan: [employer] OPEN ACCESS [[id number]] No copay/coinsurance table information available --                
skin suture

## 2024-01-12 NOTE — ED TRIAGE NOTES
Pt presents with worsening back pain that radiates to L groin into L thigh. Pt reports new numbness in L thigh. Denies loss of bowel or bladder. Pt had lumbar 3- lumbar 4 surgery on 1/3. Pt unsure of fevers. Reports increased drainage at incision site. ABCs intact.      Triage Assessment (Adult)       Row Name 01/12/24 1723          Triage Assessment    Airway WDL WDL        Respiratory WDL    Respiratory WDL WDL        Skin Circulation/Temperature WDL    Skin Circulation/Temperature WDL WDL        Cardiac WDL    Cardiac WDL WDL        Peripheral/Neurovascular WDL    Peripheral Neurovascular WDL WDL        Cognitive/Neuro/Behavioral WDL    Cognitive/Neuro/Behavioral WDL WDL        [place] Coma Scale    Best Eye Response 4-->(E4) spontaneous     Best Motor Response 6-->(M6) obeys commands     Best Verbal Response 5-->(V5) oriented     [place] Coma Scale Score 15

## 2024-01-13 LAB
ALBUMIN SERPL BCG-MCNC: 3.6 G/DL (ref 3.5–5.2)
ALBUMIN UR-MCNC: NEGATIVE MG/DL
ALP SERPL-CCNC: 97 U/L (ref 40–150)
ALT SERPL W P-5'-P-CCNC: 12 U/L (ref 0–50)
ANION GAP SERPL CALCULATED.3IONS-SCNC: 8 MMOL/L (ref 7–15)
APPEARANCE UR: CLEAR
AST SERPL W P-5'-P-CCNC: 22 U/L (ref 0–45)
BASOPHILS # BLD AUTO: 0 10E3/UL (ref 0–0.2)
BASOPHILS NFR BLD AUTO: 0 %
BILIRUB SERPL-MCNC: 0.4 MG/DL
BILIRUB UR QL STRIP: NEGATIVE
BUN SERPL-MCNC: 8.5 MG/DL (ref 8–23)
CALCIUM SERPL-MCNC: 8.8 MG/DL (ref 8.8–10.2)
CHLORIDE SERPL-SCNC: 104 MMOL/L (ref 98–107)
COLOR UR AUTO: YELLOW
CREAT SERPL-MCNC: 0.82 MG/DL (ref 0.51–0.95)
CRP SERPL-MCNC: 18.2 MG/L
DEPRECATED HCO3 PLAS-SCNC: 28 MMOL/L (ref 22–29)
EGFRCR SERPLBLD CKD-EPI 2021: 77 ML/MIN/1.73M2
EOSINOPHIL # BLD AUTO: 0.5 10E3/UL (ref 0–0.7)
EOSINOPHIL NFR BLD AUTO: 6 %
ERYTHROCYTE [DISTWIDTH] IN BLOOD BY AUTOMATED COUNT: 14.8 % (ref 10–15)
ERYTHROCYTE [SEDIMENTATION RATE] IN BLOOD BY WESTERGREN METHOD: 41 MM/HR (ref 0–30)
GLUCOSE BLDC GLUCOMTR-MCNC: 100 MG/DL (ref 70–99)
GLUCOSE BLDC GLUCOMTR-MCNC: 113 MG/DL (ref 70–99)
GLUCOSE BLDC GLUCOMTR-MCNC: 124 MG/DL (ref 70–99)
GLUCOSE BLDC GLUCOMTR-MCNC: 126 MG/DL (ref 70–99)
GLUCOSE BLDC GLUCOMTR-MCNC: 96 MG/DL (ref 70–99)
GLUCOSE SERPL-MCNC: 91 MG/DL (ref 70–99)
GLUCOSE UR STRIP-MCNC: NEGATIVE MG/DL
HCT VFR BLD AUTO: 32.7 % (ref 35–47)
HGB BLD-MCNC: 10.4 G/DL (ref 11.7–15.7)
HGB UR QL STRIP: NEGATIVE
HYALINE CASTS: 3 /LPF
IMM GRANULOCYTES # BLD: 0 10E3/UL
IMM GRANULOCYTES NFR BLD: 0 %
KETONES UR STRIP-MCNC: NEGATIVE MG/DL
LEUKOCYTE ESTERASE UR QL STRIP: NEGATIVE
LYMPHOCYTES # BLD AUTO: 2.3 10E3/UL (ref 0.8–5.3)
LYMPHOCYTES NFR BLD AUTO: 32 %
MAGNESIUM SERPL-MCNC: 2.2 MG/DL (ref 1.7–2.3)
MAGNESIUM SERPL-MCNC: 2.2 MG/DL (ref 1.7–2.3)
MCH RBC QN AUTO: 29.6 PG (ref 26.5–33)
MCHC RBC AUTO-ENTMCNC: 31.8 G/DL (ref 31.5–36.5)
MCV RBC AUTO: 93 FL (ref 78–100)
MONOCYTES # BLD AUTO: 1.2 10E3/UL (ref 0–1.3)
MONOCYTES NFR BLD AUTO: 17 %
MRSA DNA SPEC QL NAA+PROBE: NEGATIVE
MUCOUS THREADS #/AREA URNS LPF: PRESENT /LPF
NEUTROPHILS # BLD AUTO: 3.2 10E3/UL (ref 1.6–8.3)
NEUTROPHILS NFR BLD AUTO: 45 %
NITRATE UR QL: NEGATIVE
NRBC # BLD AUTO: 0 10E3/UL
NRBC BLD AUTO-RTO: 0 /100
PH UR STRIP: 5.5 [PH] (ref 5–7)
PLATELET # BLD AUTO: 263 10E3/UL (ref 150–450)
POTASSIUM SERPL-SCNC: 4 MMOL/L (ref 3.4–5.3)
PROCALCITONIN SERPL IA-MCNC: 0.04 NG/ML
PROT SERPL-MCNC: 6.3 G/DL (ref 6.4–8.3)
RBC # BLD AUTO: 3.51 10E6/UL (ref 3.8–5.2)
RBC URINE: <1 /HPF
SA TARGET DNA: NEGATIVE
SODIUM SERPL-SCNC: 140 MMOL/L (ref 135–145)
SP GR UR STRIP: 1.02 (ref 1–1.03)
SQUAMOUS EPITHELIAL: 2 /HPF
UROBILINOGEN UR STRIP-MCNC: <2 MG/DL
WBC # BLD AUTO: 7.2 10E3/UL (ref 4–11)
WBC URINE: 2 /HPF

## 2024-01-13 PROCEDURE — 87641 MR-STAPH DNA AMP PROBE: CPT | Performed by: INTERNAL MEDICINE

## 2024-01-13 PROCEDURE — 85025 COMPLETE CBC W/AUTO DIFF WBC: CPT | Performed by: INTERNAL MEDICINE

## 2024-01-13 PROCEDURE — 250N000009 HC RX 250: Performed by: FAMILY MEDICINE

## 2024-01-13 PROCEDURE — 83735 ASSAY OF MAGNESIUM: CPT | Performed by: FAMILY MEDICINE

## 2024-01-13 PROCEDURE — 36415 COLL VENOUS BLD VENIPUNCTURE: CPT | Performed by: INTERNAL MEDICINE

## 2024-01-13 PROCEDURE — 82962 GLUCOSE BLOOD TEST: CPT

## 2024-01-13 PROCEDURE — 250N000013 HC RX MED GY IP 250 OP 250 PS 637: Performed by: ORTHOPAEDIC SURGERY

## 2024-01-13 PROCEDURE — 87640 STAPH A DNA AMP PROBE: CPT | Performed by: INTERNAL MEDICINE

## 2024-01-13 PROCEDURE — 250N000013 HC RX MED GY IP 250 OP 250 PS 637: Performed by: INTERNAL MEDICINE

## 2024-01-13 PROCEDURE — 85652 RBC SED RATE AUTOMATED: CPT | Performed by: INTERNAL MEDICINE

## 2024-01-13 PROCEDURE — 80053 COMPREHEN METABOLIC PANEL: CPT | Performed by: INTERNAL MEDICINE

## 2024-01-13 PROCEDURE — 99233 SBSQ HOSP IP/OBS HIGH 50: CPT | Performed by: FAMILY MEDICINE

## 2024-01-13 PROCEDURE — 250N000011 HC RX IP 250 OP 636: Performed by: INTERNAL MEDICINE

## 2024-01-13 PROCEDURE — 120N000001 HC R&B MED SURG/OB

## 2024-01-13 PROCEDURE — 81001 URINALYSIS AUTO W/SCOPE: CPT | Performed by: INTERNAL MEDICINE

## 2024-01-13 PROCEDURE — 86140 C-REACTIVE PROTEIN: CPT | Performed by: INTERNAL MEDICINE

## 2024-01-13 PROCEDURE — 250N000013 HC RX MED GY IP 250 OP 250 PS 637: Performed by: FAMILY MEDICINE

## 2024-01-13 PROCEDURE — 250N000011 HC RX IP 250 OP 636: Performed by: FAMILY MEDICINE

## 2024-01-13 RX ORDER — NALOXONE HYDROCHLORIDE 0.4 MG/ML
0.2 INJECTION, SOLUTION INTRAMUSCULAR; INTRAVENOUS; SUBCUTANEOUS
Status: DISCONTINUED | OUTPATIENT
Start: 2024-01-13 | End: 2024-01-15 | Stop reason: HOSPADM

## 2024-01-13 RX ORDER — NICOTINE POLACRILEX 4 MG
15-30 LOZENGE BUCCAL
Status: DISCONTINUED | OUTPATIENT
Start: 2024-01-13 | End: 2024-01-15 | Stop reason: HOSPADM

## 2024-01-13 RX ORDER — CEPHALEXIN 500 MG/1
500 CAPSULE ORAL EVERY 6 HOURS SCHEDULED
Status: DISCONTINUED | OUTPATIENT
Start: 2024-01-13 | End: 2024-01-15 | Stop reason: HOSPADM

## 2024-01-13 RX ORDER — ESZOPICLONE 1 MG/1
3 TABLET, FILM COATED ORAL
Status: DISCONTINUED | OUTPATIENT
Start: 2024-01-13 | End: 2024-01-15 | Stop reason: HOSPADM

## 2024-01-13 RX ORDER — NALOXONE HYDROCHLORIDE 0.4 MG/ML
0.4 INJECTION, SOLUTION INTRAMUSCULAR; INTRAVENOUS; SUBCUTANEOUS
Status: DISCONTINUED | OUTPATIENT
Start: 2024-01-13 | End: 2024-01-15 | Stop reason: HOSPADM

## 2024-01-13 RX ORDER — ROSUVASTATIN CALCIUM 10 MG/1
40 TABLET, COATED ORAL AT BEDTIME
Status: DISCONTINUED | OUTPATIENT
Start: 2024-01-13 | End: 2024-01-15 | Stop reason: HOSPADM

## 2024-01-13 RX ORDER — PANTOPRAZOLE SODIUM 40 MG/1
40 TABLET, DELAYED RELEASE ORAL 2 TIMES DAILY
Status: DISCONTINUED | OUTPATIENT
Start: 2024-01-13 | End: 2024-01-15 | Stop reason: HOSPADM

## 2024-01-13 RX ORDER — SEMAGLUTIDE 1.34 MG/ML
0.5 INJECTION, SOLUTION SUBCUTANEOUS
COMMUNITY

## 2024-01-13 RX ORDER — DILTIAZEM HYDROCHLORIDE 120 MG/1
240 CAPSULE, COATED, EXTENDED RELEASE ORAL DAILY
Status: DISCONTINUED | OUTPATIENT
Start: 2024-01-13 | End: 2024-01-15 | Stop reason: HOSPADM

## 2024-01-13 RX ORDER — LEVOTHYROXINE SODIUM 150 UG/1
150 TABLET ORAL EVERY MORNING
COMMUNITY

## 2024-01-13 RX ORDER — ONDANSETRON 2 MG/ML
4 INJECTION INTRAMUSCULAR; INTRAVENOUS EVERY 6 HOURS PRN
Status: DISCONTINUED | OUTPATIENT
Start: 2024-01-13 | End: 2024-01-15 | Stop reason: HOSPADM

## 2024-01-13 RX ORDER — BRIMONIDINE TARTRATE 2 MG/ML
1 SOLUTION/ DROPS OPHTHALMIC 2 TIMES DAILY
COMMUNITY

## 2024-01-13 RX ORDER — IRBESARTAN 150 MG/1
150 TABLET ORAL AT BEDTIME
Status: DISCONTINUED | OUTPATIENT
Start: 2024-01-13 | End: 2024-01-15 | Stop reason: HOSPADM

## 2024-01-13 RX ORDER — LEVOTHYROXINE SODIUM 75 UG/1
150 TABLET ORAL EVERY MORNING
Status: DISCONTINUED | OUTPATIENT
Start: 2024-01-14 | End: 2024-01-15 | Stop reason: HOSPADM

## 2024-01-13 RX ORDER — ESZOPICLONE 1 MG/1
1 TABLET, FILM COATED ORAL ONCE
Status: COMPLETED | OUTPATIENT
Start: 2024-01-13 | End: 2024-01-13

## 2024-01-13 RX ORDER — MULTIPLE VITAMINS W/ MINERALS TAB 9MG-400MCG
1 TAB ORAL DAILY
Status: DISCONTINUED | OUTPATIENT
Start: 2024-01-13 | End: 2024-01-15 | Stop reason: HOSPADM

## 2024-01-13 RX ORDER — GABAPENTIN 300 MG/1
300 CAPSULE ORAL AT BEDTIME
Status: DISCONTINUED | OUTPATIENT
Start: 2024-01-13 | End: 2024-01-15 | Stop reason: HOSPADM

## 2024-01-13 RX ORDER — TIMOLOL MALEATE 5 MG/ML
1 SOLUTION/ DROPS OPHTHALMIC DAILY
Status: DISCONTINUED | OUTPATIENT
Start: 2024-01-13 | End: 2024-01-15 | Stop reason: HOSPADM

## 2024-01-13 RX ORDER — LATANOPROST 50 UG/ML
1 SOLUTION/ DROPS OPHTHALMIC AT BEDTIME
Status: DISCONTINUED | OUTPATIENT
Start: 2024-01-13 | End: 2024-01-15 | Stop reason: HOSPADM

## 2024-01-13 RX ORDER — ACETAMINOPHEN 325 MG/1
975 TABLET ORAL EVERY 8 HOURS PRN
Status: DISCONTINUED | OUTPATIENT
Start: 2024-01-13 | End: 2024-01-14

## 2024-01-13 RX ORDER — TOLTERODINE 2 MG/1
2 CAPSULE, EXTENDED RELEASE ORAL DAILY
Status: DISCONTINUED | OUTPATIENT
Start: 2024-01-14 | End: 2024-01-15 | Stop reason: HOSPADM

## 2024-01-13 RX ORDER — HYDROMORPHONE HYDROCHLORIDE 1 MG/ML
0.3 INJECTION, SOLUTION INTRAMUSCULAR; INTRAVENOUS; SUBCUTANEOUS ONCE
Status: COMPLETED | OUTPATIENT
Start: 2024-01-13 | End: 2024-01-13

## 2024-01-13 RX ORDER — MONTELUKAST SODIUM 10 MG/1
10 TABLET ORAL AT BEDTIME
Status: DISCONTINUED | OUTPATIENT
Start: 2024-01-13 | End: 2024-01-15 | Stop reason: HOSPADM

## 2024-01-13 RX ORDER — DEXTROSE MONOHYDRATE 25 G/50ML
25-50 INJECTION, SOLUTION INTRAVENOUS
Status: DISCONTINUED | OUTPATIENT
Start: 2024-01-13 | End: 2024-01-15 | Stop reason: HOSPADM

## 2024-01-13 RX ORDER — ACETAMINOPHEN 500 MG
1000 TABLET ORAL 2 TIMES DAILY
COMMUNITY

## 2024-01-13 RX ORDER — LURASIDONE HYDROCHLORIDE 20 MG/1
80 TABLET, FILM COATED ORAL AT BEDTIME
Status: DISCONTINUED | OUTPATIENT
Start: 2024-01-13 | End: 2024-01-15 | Stop reason: HOSPADM

## 2024-01-13 RX ORDER — POLYETHYLENE GLYCOL 400 2.5 MG/ML
1-2 SOLUTION/ DROPS OPHTHALMIC DAILY PRN
COMMUNITY

## 2024-01-13 RX ORDER — AMOXICILLIN 250 MG
1 CAPSULE ORAL 2 TIMES DAILY
Status: DISCONTINUED | OUTPATIENT
Start: 2024-01-13 | End: 2024-01-13

## 2024-01-13 RX ORDER — CYCLOBENZAPRINE HCL 10 MG
10 TABLET ORAL AT BEDTIME
Status: DISCONTINUED | OUTPATIENT
Start: 2024-01-13 | End: 2024-01-15 | Stop reason: HOSPADM

## 2024-01-13 RX ORDER — ATOMOXETINE 25 MG/1
100 CAPSULE ORAL EVERY MORNING
Status: DISCONTINUED | OUTPATIENT
Start: 2024-01-14 | End: 2024-01-15 | Stop reason: HOSPADM

## 2024-01-13 RX ORDER — ESZOPICLONE 1 MG/1
2 TABLET, FILM COATED ORAL
Status: DISCONTINUED | OUTPATIENT
Start: 2024-01-13 | End: 2024-01-13 | Stop reason: DRUGHIGH

## 2024-01-13 RX ORDER — QUETIAPINE FUMARATE 50 MG/1
150 TABLET, EXTENDED RELEASE ORAL AT BEDTIME
Status: DISCONTINUED | OUTPATIENT
Start: 2024-01-13 | End: 2024-01-15 | Stop reason: HOSPADM

## 2024-01-13 RX ORDER — POLYETHYLENE GLYCOL 3350 17 G/17G
17 POWDER, FOR SOLUTION ORAL DAILY
Status: DISCONTINUED | OUTPATIENT
Start: 2024-01-13 | End: 2024-01-13

## 2024-01-13 RX ORDER — HYDROMORPHONE HYDROCHLORIDE 1 MG/ML
.2-.4 INJECTION, SOLUTION INTRAMUSCULAR; INTRAVENOUS; SUBCUTANEOUS EVERY 4 HOURS PRN
Status: DISCONTINUED | OUTPATIENT
Start: 2024-01-13 | End: 2024-01-14

## 2024-01-13 RX ADMIN — CEPHALEXIN 500 MG: 500 CAPSULE ORAL at 12:30

## 2024-01-13 RX ADMIN — TIMOLOL MALEATE 1 DROP: 5 SOLUTION OPHTHALMIC at 20:13

## 2024-01-13 RX ADMIN — ESZOPICLONE 3 MG: 1 TABLET, FILM COATED ORAL at 20:21

## 2024-01-13 RX ADMIN — HYDROMORPHONE HYDROCHLORIDE 0.3 MG: 1 INJECTION, SOLUTION INTRAMUSCULAR; INTRAVENOUS; SUBCUTANEOUS at 06:47

## 2024-01-13 RX ADMIN — Medication 2 TABLET: at 20:12

## 2024-01-13 RX ADMIN — MONTELUKAST SODIUM 10 MG: 10 TABLET, COATED ORAL at 20:12

## 2024-01-13 RX ADMIN — CYCLOBENZAPRINE 10 MG: 10 TABLET, FILM COATED ORAL at 20:12

## 2024-01-13 RX ADMIN — ROSUVASTATIN CALCIUM 40 MG: 10 TABLET, FILM COATED ORAL at 20:12

## 2024-01-13 RX ADMIN — SENNOSIDES AND DOCUSATE SODIUM 1 TABLET: 8.6; 5 TABLET ORAL at 20:12

## 2024-01-13 RX ADMIN — HYDROMORPHONE HYDROCHLORIDE 0.4 MG: 1 INJECTION, SOLUTION INTRAMUSCULAR; INTRAVENOUS; SUBCUTANEOUS at 21:39

## 2024-01-13 RX ADMIN — ONDANSETRON 4 MG: 2 INJECTION INTRAMUSCULAR; INTRAVENOUS at 10:00

## 2024-01-13 RX ADMIN — SENNOSIDES AND DOCUSATE SODIUM 1 TABLET: 8.6; 5 TABLET ORAL at 00:41

## 2024-01-13 RX ADMIN — GABAPENTIN 300 MG: 300 CAPSULE ORAL at 20:12

## 2024-01-13 RX ADMIN — ATOMOXETINE 60 MG: 40 CAPSULE ORAL at 20:13

## 2024-01-13 RX ADMIN — ESZOPICLONE 2 MG: 1 TABLET, FILM COATED ORAL at 00:41

## 2024-01-13 RX ADMIN — OXYCODONE HYDROCHLORIDE 5 MG: 5 TABLET ORAL at 17:30

## 2024-01-13 RX ADMIN — IRBESARTAN 150 MG: 150 TABLET ORAL at 20:13

## 2024-01-13 RX ADMIN — HYDROMORPHONE HYDROCHLORIDE 0.4 MG: 1 INJECTION, SOLUTION INTRAMUSCULAR; INTRAVENOUS; SUBCUTANEOUS at 03:47

## 2024-01-13 RX ADMIN — LATANOPROST 1 DROP: 50 SOLUTION OPHTHALMIC at 20:13

## 2024-01-13 RX ADMIN — CEPHALEXIN 500 MG: 500 CAPSULE ORAL at 17:29

## 2024-01-13 RX ADMIN — HYDROMORPHONE HYDROCHLORIDE 0.4 MG: 1 INJECTION, SOLUTION INTRAMUSCULAR; INTRAVENOUS; SUBCUTANEOUS at 08:56

## 2024-01-13 RX ADMIN — PANTOPRAZOLE SODIUM 40 MG: 40 TABLET, DELAYED RELEASE ORAL at 20:12

## 2024-01-13 RX ADMIN — LURASIDONE HYDROCHLORIDE 80 MG: 20 TABLET, COATED ORAL at 20:11

## 2024-01-13 RX ADMIN — QUETIAPINE FUMARATE 150 MG: 50 TABLET, EXTENDED RELEASE ORAL at 20:13

## 2024-01-13 RX ADMIN — HYDROMORPHONE HYDROCHLORIDE 0.4 MG: 1 INJECTION, SOLUTION INTRAMUSCULAR; INTRAVENOUS; SUBCUTANEOUS at 14:13

## 2024-01-13 RX ADMIN — ESZOPICLONE 1 MG: 1 TABLET, FILM COATED ORAL at 01:50

## 2024-01-13 ASSESSMENT — ACTIVITIES OF DAILY LIVING (ADL)
ADLS_ACUITY_SCORE: 36
ADLS_ACUITY_SCORE: 38
ADLS_ACUITY_SCORE: 36
ADLS_ACUITY_SCORE: 38
ADLS_ACUITY_SCORE: 36
ADLS_ACUITY_SCORE: 38
ADLS_ACUITY_SCORE: 36
ADLS_ACUITY_SCORE: 35

## 2024-01-13 NOTE — PROGRESS NOTES
"[facility]    Medicine Progress Note - Hospitalist Service    Date of Admission:  1/12/2024    Assessment & Plan   Acute on chronic low back pain status post recent L3/L4 fusion  Superficial wound dehiscence with postoperative hematoma versus seroma  Appreciate spine input  Wound cares  Oral antibiotics  PT and OT evaluation  No plan for surgical intervention currently  Advance diet  Pain control    Acute blood loss anemia    Obesity    Insulin-dependent diabetes    ADD/depression          Diet: Combination Diet Regular Diet; Moderate Consistent Carb (60 g CHO per Meal) Diet; 2 gm NA Diet    DVT Prophylaxis: Pneumatic Compression Devices  Estrada Catheter: Not present  Lines: None     Cardiac Monitoring: None  Code Status: Full Code      Clinically Significant Risk Factors Present on Admission                  # Hypertension: Noted on problem list      # Severe Obesity: Estimated body mass index is 40.24 kg/m  as calculated from the following:    Height as of this encounter: 1.575 m (5' 2\").    Weight as of this encounter: 99.8 kg (220 lb).              Disposition Plan      Expected Discharge Date: 01/14/2024                    Fabrizio Ramirez MD  Hospitalist Service  [facility]  Securely message with [employer] (more info)  Text page via [employer] Paging/Directory   ______________________________________________________________________    Interval History   Pleased that she does not need to have surgery.  Was already seen by the surgeon.  Wants to eat.  No fevers or chills.  Nausea vomiting.  Still having pain.  No alarm symptoms.    Physical Exam   Vital Signs: Temp: 98.2  F (36.8  C) Temp src: Oral BP: 112/60 Pulse: 87   Resp: 16 SpO2: 96 % O2 Device: None (Room air)    Weight: 220 lbs 0 oz    General Appearance: No apparent distress  Respiratory: Clear to auscultation bilaterally  Cardiovascular: Regular rate and rhythm  GI: Abdomen is soft and nontender  Skin: " Visualized skin is normal  Other: Neurologic exam is grossly normal moves all 4 extremities without issues        52 MINUTES SPENT BY ME on the date of service doing chart review, history, exam, documentation & further activities per the note.      Data     I have personally reviewed the following data over the past 24 hrs:    7.2  \   10.4 (L)   / 263     140 104 8.5 /  126 (H)   4.0 28 0.82 \     ALT: 12 AST: 22 AP: 97 TBILI: 0.4   ALB: 3.6 TOT PROTEIN: 6.3 (L) LIPASE: N/A     Procal: 0.04 CRP: 18.20 (H) Lactic Acid: N/A         Imaging results reviewed over the past 24 hrs:   Recent Results (from the past 24 hour(s))   CT Abdomen Pelvis w/o Contrast    Narrative    EXAM: CT ABDOMEN PELVIS W/O CONTRAST  LOCATION: [facility]  DATE: 1/12/2024    INDICATION: Left lower quadrant abdominal pain. Left hip pain. Wound dehiscence.  COMPARISON: None.  TECHNIQUE: CT scan of the abdomen and pelvis was performed without IV contrast. Multiplanar reformats were obtained. Dose reduction techniques were used.  CONTRAST: None.    FINDINGS:   LOWER CHEST: Small hiatal hernia. Bibasilar atelectasis/scarring.    HEPATOBILIARY: No calcified gallstones or biliary ductal dilation.    PANCREAS: Normal.    SPLEEN: Normal.    ADRENAL GLANDS: Normal.    KIDNEYS/BLADDER: Small parapelvic right renal cyst, which does not require follow-up. No urinary calculi or hydronephrosis. Normal bladder.    BOWEL: No bowel obstruction or inflammation. Normal appendix. Previous sigmoid resection with a patent anastomosis. Pancolonic diverticulosis. No inflamed colonic diverticuli. Moderate amount stool throughout the colon.    LYMPH NODES: No enlarged lymph nodes.    VASCULATURE: No abdominal aortic aneurysm. No retroperitoneal hematoma.    PELVIC ORGANS: Normal.    MUSCULOSKELETAL: Osseous fusion of L2-L3 with posterior spinal fusion hardware at L3-L4. Multilevel degenerative changes of the spine. Ill-defined subcutaneous edema  within the left flank and posterior paraspinal tissues related to the recent surgery. No   fluid collections. Tiny fat-containing periumbilical hernia.      Impression    IMPRESSION:     1.  No acute abnormality or specific cause of abdominal pain are identified.    2.  Subcutaneous edema within the left flank and in the posterior paraspinal tissues related to the recent surgery. No fluid collections. No retroperitoneal hematoma.    3.  Moderate amount of stool throughout the colon, suggesting constipation.    4.  Pancolonic diverticulosis. No inflamed colonic diverticuli.   Lumbar spine MRI w/o contrast    Narrative    EXAM: MR LUMBAR SPINE W/O CONTRAST  LOCATION: [facility]  DATE: 1/12/2024    INDICATION: Mid lumbar back pain, LLQ abdominal pain s/p posterior L3-L4 fusion 01/03/2024 with wound dehiscence  COMPARISON:  CT abdomen pelvis 01/12/2024, intraoperative fluoroscopy 01/03/2024.  TECHNIQUE: Routine Lumbar Spine MRI without IV contrast.    FINDINGS:   Nomenclature is based on 5 lumbar type vertebral bodies with L5-S1 defined on image 5 of series 7. Alignment is unchanged with grade 1 degenerative anterolisthesis at L4-L5. Osseous fusion of L2-L3.  Recent L3-L4 PLIF and XLIF.  No suspicious marrow   signal abnormality. Normal distal spinal cord and cauda equina with conus medullaris at L2.     Prevertebral soft tissues are unremarkable. Postoperative changes of the dorsal soft tissues with small sterility indeterminate collections at the postoperative tracts. Intra-abdominal structures better assessed on same day CT abdomen pelvis.    T12-L1: Circumferential disc and osteophyte. Severe disc height loss. No significant facet arthropathy. No significant canal or foraminal stenosis.    L1-L2: Mild diffuse bulge. Mild disc height loss. Mild bilateral facet arthropathy. No significant canal or foraminal stenosis.    L2-L3: Osseous fusion. No significant canal or foraminal stenosis.      L3-L4: Postoperative level with posterior and interbody fusion. No convincing high-grade canal stenosis. Mild right foraminal stenosis.    L4-L5:  Postoperative level with prior laminectomy. Anterolisthesis. Circumferential osteophytic spurring. Severe disc height loss. Mild to moderate bilateral facet arthropathy. No significant canal stenosis. Moderate bilateral foraminal stenosis.    L5-S1:  Postoperative level with prior laminotomy. Circumferential disc and osteophyte. Severe disc height loss. Moderate bilateral facet arthropathy with ligamentum flavum hypertrophy. No significant canal stenosis. Mild bilateral foraminal stenosis.      Impression    IMPRESSION:  1.  Recent postoperative changes from L3-L4 PLIF, XLIF, and laminotomy. Small sterility indeterminate superficial fluid collections along the postoperative tracts L3-L4, favor postoperative hematoma/seroma in the absence of infectious symptoms.   Otherwise, no unexpected findings.  2.  Multilevel postoperative and advanced degenerative changes without high-grade canal stenosis.  3.  Moderate bilateral L4-L5 foraminal stenosis.

## 2024-01-13 NOTE — PLAN OF CARE
Problem: Adult Inpatient Plan of Care  Goal: Absence of Hospital-Acquired Illness or Injury  Outcome: Progressing  Intervention: Identify and Manage Fall Risk  Recent Flowsheet Documentation  Taken 1/13/2024 1620 by Zenaida Méndez RN  Safety Promotion/Fall Prevention: safety round/check completed     Problem: Pain Acute  Goal: Optimal Pain Control and Function  Intervention: Develop Pain Management Plan  Recent Flowsheet Documentation  Taken 1/13/2024 1620 by Zenaida Méndez RN  Pain Management Interventions: (waiting for dinner to take meds) rest   Goal Outcome Evaluation: Pt pain ratings up to 8/10. P.O. oxycodone and IV dilaudid given this shift. Able to ambulate with SBA to bathroom. Dressing changed wet to dry this evening. Incision is not open enough to pack, sticky note left for physician team.                          Initial / Assessment/Plan of Care Note     Baseline Assessment  89 year old admitted 2/20/2021 as Inpatient with a diagnosis of chest pain/unstable angina. Prior to admission patient was living with Spouse/significant other and residing at House.  Patient does  have a Power of  for Healthcare.  Document is not activated.  Agent is wife Elba. Patient’s Primary Care Provider is Radha Vang NP.     Medical History  Past Medical History:   Diagnosis Date   • Acquired hypothyroidism 1/5/2021   • Arthritis    • Arthritis of both knees 5/8/2017   • Atherosclerotic heart disease    • Bilateral cataracts    • BPH (benign prostatic hypertrophy)    • Gastroesophageal reflux disease    • Hyperlipidemia    • Hypertension    • Keratosis, actinic    • LVH (left ventricular hypertrophy) 5/8/2017   • MI (myocardial infarction) (CMS/[facility])    • Squamous cell skin cancer    • Valvular heart disease 5/8/2017       Prior to Admission Status  Functional Status  Ambulation: Independent/Self, Cane  Bathing: Independent/Self  Dressing: Independent/Self  Toileting: Independent/Self  Meal Preparation: Significant Other  Shopping: Independent/Self  Medication Preparation: Independent/Self  Medication Administration: Independent/Self  Housekeeping: Significant Other  Laundry: Significant Other  Transportation: Independent/Self    Agency/Support  Type of Services Prior to Hospitalization: None  Support Systems: Spouse/Significant other  Home Devices/Equipment: Blood pressure monitor  Mobility Assist Devices: Cane  Sensory Support Devices: Eyeglasses    Current Status  Current Mental Status: Cooperative, Pleasant  Stressors:      Insurance  Primary: N/A  Secondary: N/A    Barriers to Discharge  Identified Barriers to Discharge/Transition Planning: Assessment/stabilization in progress    Progress Note   met with patient at bedside, along with his wife Elba, with his permission.     Patient was A & O X 3 sitting in  his recliner, and easily engaged. SW oriented him to the role of SW in the hospital setting, specifically as it relates to discharge planning.     Patient was fully capable of answering SW's questions, but Elba often chimed in. At one point, patient expressed that he preferred to answer. They both joked around throughout, and let SW know that Elba is 82 years old, where patient is 89.     They live together in a ranch style home, in which there are no stairs for patient to navigate. Patient ambulates independently, but does use a cane when getting up in the middle of the night to use the restroom. He manages his own medication, fulfills his ADLs independently, and continues to drive. Elba does the cooking and cleaning, but patient periodically makes homemade applesauce and does the dishes.     Patient reports that he is having stents placed tomorrow, and he may be interested in home therapy upon discharge. He reports that he's been suffering with hip pain, and was prescribed some exercises from a previous hospitalization in [place], but he does not diligently do them. Patient believes home therapy may benefit him, and SW promised to ask about team's recommendations for him prior to discharge.     SW will continue to follow.     Plan  SW/CM - Recommendations for Discharge:   Home therapy and TBD  PT - Recommendations for Discharge:      OT - Recommendations for Discharge:      SLP - Recommendations for Discharge:     Anticipate patient will need post-hospital services. Necessary services are available.  Anticipate patient can return to the environment from which patient entered the hospital.   Anticipate patient can provide self-care at discharge.    Refer to SW/CM Flowsheet for Goals and objective data.     BRIDGER Nelson, APSW    [facility]   Cell: [phone]  On weekends please call: [phone]
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
## 2024-01-13 NOTE — PHARMACY-ADMISSION MEDICATION HISTORY
Pharmacy Intern Admission Medication History    Admission medication history is complete. The information provided in this note is only as accurate as the sources available at the time of the update.    Medication reconciliation/reorder completed by provider prior to medication history? No    Information Source(s): Patient and CareEverywhere/SureScripts via in-person    Pertinent Information: Patient requests brand name Synthroid product per instructions from her endocrinologist     Changes made to PTA medication list:  Added: Brimonidine eye drops, blink eye drop   Deleted: None   Changed: Ozempic 0.25-0.5mg pen, Tylenol to 1000mg bid     Medication Affordability:   Not including over the counter (OTC) medications, was there a time in the past 3 months when you did not take your medications as prescribed because of cost?: No    Allergies reviewed with patient and updates made in EHR: yes    Medication available for use during hospital stay: None    Medication History Completed By: Brandt Amaro 1/13/2024 8:02 AM    PTA Med List   Medication Sig Note Last Dose    acetaminophen (TYLENOL) 500 MG tablet Take 1,000 mg by mouth 2 times daily  1/12/2024    atomoxetine (STRATTERA) 100 MG capsule Take 100 mg by mouth every morning  1/12/2024 at am    atomoxetine (STRATTERA) 60 MG capsule Take 60 mg by mouth At Bedtime  1/11/2024 at hs    brimonidine (ALPHAGAN) 0.2 % ophthalmic solution Place 1 drop Into the left eye 2 times daily 1/13/2024: Patient hasn't started taking yet      cyclobenzaprine (FLEXERIL) 10 MG tablet Take 10 mg by mouth at bedtime  1/11/2024 at hs    diltiazem ER COATED BEADS (CARDIZEM CD/CARTIA XT) 240 MG 24 hr capsule Take 1 capsule (240 mg) by mouth daily **Labs due for further refills [phone]**  1/12/2024 at am    estradiol (VAGIFEM) 10 MCG TABS vaginal tablet Place 10 mcg vaginally as needed (Twice weekly as needed) Twice weekly as needed  Past Month at PRN    eszopiclone (LUNESTA) 3 MG tablet TAKE  1 TABLET BY MOUTH AT BEDTIME AS NEEDED FOR SLEEP  1/11/2024 at hs    fish oil-omega-3 fatty acids 1000 MG capsule Take 1 capsule by mouth every morning  1/12/2024 at am    gabapentin (NEURONTIN) 300 MG capsule Take 300 mg by mouth at bedtime 1/13/2024: Patient hasn't started taking yet      HYDROmorphone (DILAUDID) 4 MG tablet Take 0.5-1 tablets (2-4 mg) by mouth every 4 hours as needed for moderate pain  1/12/2024    irbesartan (AVAPRO) 150 MG tablet Take 1 tablet (150 mg) by mouth At Bedtime Please complete ordered labs for further refills.  1/11/2024 at hs    latanoprost (XALATAN) 0.005 % ophthalmic solution Place 1 drop into both eyes At Bedtime  1/11/2024 at hs    levothyroxine (SYNTHROID) 150 MCG tablet Take 150 mcg by mouth every morning Brand name product only per endocrinologist 1/13/2024: Brand name product only per endocrinologist  1/12/2024 at am    lurasidone (LATUDA) 80 MG TABS tablet Take 80 mg by mouth at bedtime  1/11/2024 at hs    montelukast (SINGULAIR) 10 MG tablet Take 1 tablet (10 mg) by mouth At Bedtime *Due for office visit 8/2023, please call clinic to schedule*  1/11/2024 at hs    multivitamin, therapeutic with minerals (THERA-VIT-M) TABS Take 1 tablet by mouth daily  1/12/2024 at am    OYSCO 500 + D 500-200 MG-UNIT tablet TAKE 2 TABLETS BY MOUTH TWICE A DAY  1/12/2024    pantoprazole (PROTONIX) 40 MG EC tablet Take 1 tablet (40 mg) by mouth 2 times daily  1/12/2024    polyethylene glycol (MIRALAX) powder Take 17 g by mouth daily  1/12/2024 at am    polyethylene glycol 400 (BLINK TEARS) 0.25 % SOLN ophthalmic solution Place 1-2 drops into both eyes daily as needed for dry eyes  Past Week at PRN    QUEtiapine (SEROQUEL XR) 150 MG TB24 24 hr tablet Take 150 mg by mouth at bedtime  1/11/2024 at hs    rosuvastatin (CRESTOR) 40 MG tablet Take 1 tablet (40 mg) by mouth daily Please complete ordered labs for further refills.  1/11/2024 at     semaglutide (OZEMPIC, 0.25 OR 0.5 MG/DOSE,) 2  MG/1.5ML SOPN pen Inject 0.5 mg Subcutaneous every 7 days 1/13/2024: On Saturdays 1/6/2024    senna-docusate (SENOKOT-S/PERICOLACE) 8.6-50 MG tablet Take 1 tablet by mouth 2 times daily  1/12/2024    solifenacin (VESICARE) 5 MG tablet Take 1 tablet (5 mg) by mouth daily * SCHEDULE CLINIC APPT.*  1/12/2024 at am    timolol maleate (TIMOPTIC) 0.5 % ophthalmic solution PLACE 1 DROP INTO LEFT EYE ONCE DAILY.  1/12/2024 at am    vitamin  B complex with vitamin C (STRESS TAB) TABS Take 1 tablet by mouth daily  1/12/2024 at am    vortioxetine (TRINTELLIX) 10 MG tablet Take 10 mg by mouth daily 30mg total dose  1/12/2024 at am    vortioxetine (TRINTELLIX) 20 MG tablet Take 20 mg by mouth daily 30mg total dose  1/12/2024 at am
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
## 2024-01-13 NOTE — ED PROVIDER NOTES
Emergency Department Staff Physician Note     I had a face to face encounter with this patient seen by the Advanced Practice Provider (ENRRIQUE).  I have seen, examined, and discussed the patient with the ENRRIQUE and agree with their assessment and plan of management.    I, Afshin Barrientos, am serving as a scribe to document services personally performed by Emil Noriega MD, based on my observations and the provider's statements to me.     Relevant HPI:     Dara Lamb is a 68 year old female who presents to the Emergency Department for evaluation of hip pain.       I, Emil Noriega MD, attest that Afshin Barrientos was acting in a scribe capacity, has observed my performance of the services and has documented them in accordance with my direction.    ED Course:  6:10 PM I received the patient report from the ENRRIQUE (Ivana Weems PA-C). I agree with Ivana Weems PA-C's assessment and plan of management, and I will see the patient myself.  9:21 PM I met with the patient to introduce myself, gather additional history, perform my initial exam, and discuss the plan.   Exam:  /62   Pulse 81   Temp 98.6  F (37  C) (Temporal)   Resp 16   SpO2 92%             Please refer to the Advanced Practice Provider's note for further details and ED course. Agree with history, plan and disposition.      Vitals: /61   Pulse 94   Temp 98.6  F (37  C) (Temporal)   Resp 16   SpO2 93%   General: Appears in no acute distress, awake, alert, interactive.  Eyes: Conjunctivae non-injected. Sclera anicteric.  HENT: Atraumatic.  Neck: Supple.  Respiratory/Chest: Respiration unlabored.  Abdomen: non distended  Musculoskeletal: Normal extremities. No edema or erythema.  No weakness to lower extremities on exam  Skin: See photos below  Neurologic: Face symmetric, moves all extremities spontaneously. Speech clear.  Psychiatric: Oriented to person, place, and time. Affect appropriate.                Impression / ED Plan:  Dara Lamb is  a 68 year old female presents to the ED for evaluation of radiation of pain into her left groin and left thigh.  Also new onset paresthesia left thigh.  Had back surgery on January 3.  No fevers.  Increased drainage from the incision site.    Plan for inflammatory markers.  Also reports some left lower abdominal pain will obtain CT without contrast based on history of anaphylaxis to symptoms with contrast unsure if MRI or CT. plan for MRI of lumbar spine    Discussed with neurosurgery team recommends admission and will see the patient in the morning    Cauda equina unlikely based on history and exam    (Z98.1) S/P lumbar fusion      (T81.31XA) Postoperative wound dehiscence, initial encounter    (G89.18) Post-op pain            01/12/24  [facility] Emergency Department     Emil Noriega MD  01/12/24 1533

## 2024-01-14 ENCOUNTER — APPOINTMENT (OUTPATIENT)
Dept: PHYSICAL THERAPY | Facility: CLINIC | Age: 69
DRG: 920 | End: 2024-01-14
Attending: FAMILY MEDICINE
Payer: COMMERCIAL

## 2024-01-14 ENCOUNTER — APPOINTMENT (OUTPATIENT)
Dept: OCCUPATIONAL THERAPY | Facility: CLINIC | Age: 69
DRG: 920 | End: 2024-01-14
Attending: FAMILY MEDICINE
Payer: COMMERCIAL

## 2024-01-14 LAB
ERYTHROCYTE [DISTWIDTH] IN BLOOD BY AUTOMATED COUNT: 14.9 % (ref 10–15)
GLUCOSE BLDC GLUCOMTR-MCNC: 102 MG/DL (ref 70–99)
GLUCOSE BLDC GLUCOMTR-MCNC: 109 MG/DL (ref 70–99)
GLUCOSE BLDC GLUCOMTR-MCNC: 121 MG/DL (ref 70–99)
GLUCOSE BLDC GLUCOMTR-MCNC: 93 MG/DL (ref 70–99)
HCT VFR BLD AUTO: 33.5 % (ref 35–47)
HGB BLD-MCNC: 10.9 G/DL (ref 11.7–15.7)
MAGNESIUM SERPL-MCNC: 2.2 MG/DL (ref 1.7–2.3)
MCH RBC QN AUTO: 29.9 PG (ref 26.5–33)
MCHC RBC AUTO-ENTMCNC: 32.5 G/DL (ref 31.5–36.5)
MCV RBC AUTO: 92 FL (ref 78–100)
PLATELET # BLD AUTO: 286 10E3/UL (ref 150–450)
POTASSIUM SERPL-SCNC: 3.8 MMOL/L (ref 3.4–5.3)
RBC # BLD AUTO: 3.65 10E6/UL (ref 3.8–5.2)
WBC # BLD AUTO: 6.5 10E3/UL (ref 4–11)

## 2024-01-14 PROCEDURE — 83735 ASSAY OF MAGNESIUM: CPT | Performed by: FAMILY MEDICINE

## 2024-01-14 PROCEDURE — 99232 SBSQ HOSP IP/OBS MODERATE 35: CPT | Performed by: FAMILY MEDICINE

## 2024-01-14 PROCEDURE — 250N000013 HC RX MED GY IP 250 OP 250 PS 637

## 2024-01-14 PROCEDURE — 97535 SELF CARE MNGMENT TRAINING: CPT | Mod: GO

## 2024-01-14 PROCEDURE — 84132 ASSAY OF SERUM POTASSIUM: CPT | Performed by: FAMILY MEDICINE

## 2024-01-14 PROCEDURE — 250N000013 HC RX MED GY IP 250 OP 250 PS 637: Performed by: INTERNAL MEDICINE

## 2024-01-14 PROCEDURE — 97161 PT EVAL LOW COMPLEX 20 MIN: CPT | Mod: GP

## 2024-01-14 PROCEDURE — 250N000013 HC RX MED GY IP 250 OP 250 PS 637: Performed by: ORTHOPAEDIC SURGERY

## 2024-01-14 PROCEDURE — 120N000001 HC R&B MED SURG/OB

## 2024-01-14 PROCEDURE — 36415 COLL VENOUS BLD VENIPUNCTURE: CPT | Performed by: FAMILY MEDICINE

## 2024-01-14 PROCEDURE — 85027 COMPLETE CBC AUTOMATED: CPT | Performed by: FAMILY MEDICINE

## 2024-01-14 PROCEDURE — 250N000011 HC RX IP 250 OP 636: Performed by: FAMILY MEDICINE

## 2024-01-14 PROCEDURE — 250N000013 HC RX MED GY IP 250 OP 250 PS 637: Performed by: FAMILY MEDICINE

## 2024-01-14 PROCEDURE — 97116 GAIT TRAINING THERAPY: CPT | Mod: GP

## 2024-01-14 PROCEDURE — 97530 THERAPEUTIC ACTIVITIES: CPT | Mod: GP

## 2024-01-14 PROCEDURE — 97166 OT EVAL MOD COMPLEX 45 MIN: CPT | Mod: GO

## 2024-01-14 RX ORDER — CYCLOBENZAPRINE HCL 5 MG
5 TABLET ORAL EVERY 8 HOURS PRN
Status: DISCONTINUED | OUTPATIENT
Start: 2024-01-14 | End: 2024-01-15 | Stop reason: HOSPADM

## 2024-01-14 RX ORDER — HYDROMORPHONE HYDROCHLORIDE 1 MG/ML
.2-.4 INJECTION, SOLUTION INTRAMUSCULAR; INTRAVENOUS; SUBCUTANEOUS EVERY 4 HOURS PRN
Status: DISCONTINUED | OUTPATIENT
Start: 2024-01-14 | End: 2024-01-15 | Stop reason: HOSPADM

## 2024-01-14 RX ORDER — OXYCODONE HYDROCHLORIDE 5 MG/1
5-10 TABLET ORAL EVERY 4 HOURS PRN
Status: DISCONTINUED | OUTPATIENT
Start: 2024-01-14 | End: 2024-01-14

## 2024-01-14 RX ORDER — POTASSIUM CHLORIDE 1500 MG/1
20 TABLET, EXTENDED RELEASE ORAL ONCE
Status: COMPLETED | OUTPATIENT
Start: 2024-01-14 | End: 2024-01-14

## 2024-01-14 RX ORDER — ONDANSETRON 4 MG/1
4 TABLET, FILM COATED ORAL EVERY 6 HOURS PRN
Status: DISCONTINUED | OUTPATIENT
Start: 2024-01-14 | End: 2024-01-15 | Stop reason: HOSPADM

## 2024-01-14 RX ORDER — HYDROMORPHONE HYDROCHLORIDE 4 MG/1
2-4 TABLET ORAL
Status: DISCONTINUED | OUTPATIENT
Start: 2024-01-14 | End: 2024-01-15 | Stop reason: HOSPADM

## 2024-01-14 RX ORDER — ACETAMINOPHEN 325 MG/1
650 TABLET ORAL EVERY 6 HOURS
Status: DISCONTINUED | OUTPATIENT
Start: 2024-01-14 | End: 2024-01-15 | Stop reason: HOSPADM

## 2024-01-14 RX ADMIN — LATANOPROST 1 DROP: 50 SOLUTION OPHTHALMIC at 21:08

## 2024-01-14 RX ADMIN — Medication 2 TABLET: at 08:04

## 2024-01-14 RX ADMIN — TIMOLOL MALEATE 1 DROP: 5 SOLUTION OPHTHALMIC at 09:29

## 2024-01-14 RX ADMIN — HYDROMORPHONE HYDROCHLORIDE 2 MG: 4 TABLET ORAL at 11:21

## 2024-01-14 RX ADMIN — LEVOTHYROXINE SODIUM 150 MCG: 75 TABLET ORAL at 05:20

## 2024-01-14 RX ADMIN — Medication 1 TABLET: at 18:29

## 2024-01-14 RX ADMIN — QUETIAPINE FUMARATE 150 MG: 50 TABLET, EXTENDED RELEASE ORAL at 21:10

## 2024-01-14 RX ADMIN — ACETAMINOPHEN 650 MG: 325 TABLET ORAL at 08:05

## 2024-01-14 RX ADMIN — MONTELUKAST SODIUM 10 MG: 10 TABLET, COATED ORAL at 21:09

## 2024-01-14 RX ADMIN — LURASIDONE HYDROCHLORIDE 80 MG: 20 TABLET, COATED ORAL at 21:08

## 2024-01-14 RX ADMIN — ONDANSETRON HYDROCHLORIDE 4 MG: 4 TABLET, FILM COATED ORAL at 14:23

## 2024-01-14 RX ADMIN — ACETAMINOPHEN 650 MG: 325 TABLET ORAL at 14:23

## 2024-01-14 RX ADMIN — HYDROMORPHONE HYDROCHLORIDE 2 MG: 4 TABLET ORAL at 14:25

## 2024-01-14 RX ADMIN — CALCIUM CARBONATE (ANTACID) CHEW TAB 500 MG 1000 MG: 500 CHEW TAB at 11:50

## 2024-01-14 RX ADMIN — CYCLOBENZAPRINE HYDROCHLORIDE 5 MG: 5 TABLET, FILM COATED ORAL at 09:55

## 2024-01-14 RX ADMIN — IRBESARTAN 150 MG: 150 TABLET ORAL at 21:15

## 2024-01-14 RX ADMIN — SENNOSIDES AND DOCUSATE SODIUM 1 TABLET: 8.6; 5 TABLET ORAL at 08:05

## 2024-01-14 RX ADMIN — CEPHALEXIN 500 MG: 500 CAPSULE ORAL at 00:18

## 2024-01-14 RX ADMIN — POLYETHYLENE GLYCOL 3350 17 G: 17 POWDER, FOR SOLUTION ORAL at 08:05

## 2024-01-14 RX ADMIN — CEPHALEXIN 500 MG: 500 CAPSULE ORAL at 18:29

## 2024-01-14 RX ADMIN — CEPHALEXIN 500 MG: 500 CAPSULE ORAL at 11:21

## 2024-01-14 RX ADMIN — VORTIOXETINE 20 MG: 10 TABLET, FILM COATED ORAL at 09:32

## 2024-01-14 RX ADMIN — PANTOPRAZOLE SODIUM 40 MG: 40 TABLET, DELAYED RELEASE ORAL at 21:09

## 2024-01-14 RX ADMIN — ROSUVASTATIN CALCIUM 40 MG: 10 TABLET, FILM COATED ORAL at 21:10

## 2024-01-14 RX ADMIN — SENNOSIDES AND DOCUSATE SODIUM 1 TABLET: 8.6; 5 TABLET ORAL at 21:08

## 2024-01-14 RX ADMIN — VORTIOXETINE 10 MG: 10 TABLET, FILM COATED ORAL at 09:28

## 2024-01-14 RX ADMIN — ACETAMINOPHEN 650 MG: 325 TABLET ORAL at 21:09

## 2024-01-14 RX ADMIN — PANTOPRAZOLE SODIUM 40 MG: 40 TABLET, DELAYED RELEASE ORAL at 08:04

## 2024-01-14 RX ADMIN — HYDROMORPHONE HYDROCHLORIDE 4 MG: 4 TABLET ORAL at 18:30

## 2024-01-14 RX ADMIN — CYCLOBENZAPRINE 10 MG: 10 TABLET, FILM COATED ORAL at 21:09

## 2024-01-14 RX ADMIN — ATOMOXETINE 100 MG: 25 CAPSULE ORAL at 08:17

## 2024-01-14 RX ADMIN — Medication 2 TABLET: at 21:09

## 2024-01-14 RX ADMIN — GABAPENTIN 300 MG: 300 CAPSULE ORAL at 21:09

## 2024-01-14 RX ADMIN — TOLTERODINE TARTRATE 2 MG: 2 CAPSULE, EXTENDED RELEASE ORAL at 09:28

## 2024-01-14 RX ADMIN — POTASSIUM CHLORIDE 20 MEQ: 1500 TABLET, EXTENDED RELEASE ORAL at 12:42

## 2024-01-14 RX ADMIN — OXYCODONE HYDROCHLORIDE 5 MG: 5 TABLET ORAL at 07:13

## 2024-01-14 RX ADMIN — CEPHALEXIN 500 MG: 500 CAPSULE ORAL at 05:20

## 2024-01-14 RX ADMIN — DILTIAZEM HYDROCHLORIDE 240 MG: 120 CAPSULE, COATED, EXTENDED RELEASE ORAL at 08:04

## 2024-01-14 RX ADMIN — ATOMOXETINE 60 MG: 40 CAPSULE ORAL at 22:37

## 2024-01-14 ASSESSMENT — ACTIVITIES OF DAILY LIVING (ADL)
ADLS_ACUITY_SCORE: 36
ADLS_ACUITY_SCORE: 21
ADLS_ACUITY_SCORE: 36
ADLS_ACUITY_SCORE: 36
ADLS_ACUITY_SCORE: 21
ADLS_ACUITY_SCORE: 36
PREVIOUS_RESPONSIBILITIES: MEAL PREP;HOUSEKEEPING;LAUNDRY
ADLS_ACUITY_SCORE: 36
ADLS_ACUITY_SCORE: 21
ADLS_ACUITY_SCORE: 36

## 2024-01-14 NOTE — PROGRESS NOTES
Physical Therapy Discharge Summary    Reason for therapy discharge:    All goals and outcomes met, no further needs identified.    Progress towards therapy goal(s). See goals on Care Plan in Epic electronic health record for goal details.  Goals met    Therapy recommendation(s):    Continue with home ambulation program as instructed.

## 2024-01-14 NOTE — PLAN OF CARE
Problem: Adult Inpatient Plan of Care  Goal: Absence of Hospital-Acquired Illness or Injury  Intervention: Prevent Skin Injury  Recent Flowsheet Documentation  Taken 1/14/2024 0009 by Ladan Fenton RN  Body Position: position changed independently     Problem: Pain Acute  Goal: Optimal Pain Control and Function  Outcome: Progressing  Intervention: Optimize Psychosocial Wellbeing  Recent Flowsheet Documentation  Taken 1/14/2024 0009 by Ladan Fenton RN  Supportive Measures: active listening utilized   Goal Outcome Evaluation:  Patient vital signs are at baseline: Yes  Patient able to ambulate as they were prior to admission or with assist devices provided by therapies during their stay:  Yes  Patient MUST void prior to discharge:  Yes  Patient able to tolerate oral intake:  Yes  Pain has adequate pain control using Oral analgesics:  Yes  Does patient have an identified :  Yes  Has goal D/C date and time been discussed with patient:  Yes     Patient is alert & oriented. Vitally stable, on room air. Denies pain. Ambulates with stand by assist. Dressing is clean dry intact.  . Diabetic low sodium diet. 50 ml/hr NS infusing. PIV infiltrated and had to be taken out. Need ultrasound for IV placement, SWAT & oncoming RN aware.

## 2024-01-14 NOTE — PROGRESS NOTES
01/14/24 0900   Appointment Info   Signing Clinician's Name / Credentials (OT) Jenna Ventura OTR/L   Living Environment   People in Home significant other   Current Living Arrangements house  (3 levels.)   Transportation Anticipated family or friend will provide   Living Environment Comments Sherri DEJESUS toilet ht with vanity on the L side.   Self-Care   Usual Activity Tolerance good   Current Activity Tolerance moderate   Instrumental Activities of Daily Living (IADL)   Previous Responsibilities meal prep;housekeeping;laundry   IADL Comments SO able  to assist.   General Information   Onset of Illness/Injury or Date of Surgery 01/12/24   Referring Physician Dr. Fabrizio Ramirez   Patient/Family Therapy Goal Statement (OT) To get the pain/numbness in the L leg to stop   Additional Occupational Profile Info/Pertinent History of Current Problem Adm for post op spinal surgery pain.  PMH:  Anemia, DM2, ADD,  Depression   Existing Precautions/Restrictions (S)  spinal  (5 lb lifting precaution.)   General Observations and Info L leg weakness and pain.   Cognitive Status Examination   Follows Commands WNL;WFL   Transfers   Transfers bed-chair transfer;sit-stand transfer;toilet transfer;shower transfer   Transfer Skill: Bed to Chair/Chair to Bed   Bed-Chair [place] (Transfers) supervision;verbal cues   Assistive Device (Bed-Chair Transfers) rolling walker   Sit-Stand Transfer   Sit-Stand [place] (Transfers) supervision;verbal cues   Assistive Device (Sit-Stand Transfers) walker, front-wheeled   Shower Transfer   Type (Shower Transfer) lateral   [place] Level (Shower Transfer) supervision;verbal cues   Assistive Device (Shower Transfer) walker, front-wheeled   Toilet Transfer   Type (Toilet Transfer) sit-stand;stand-sit   [place] Level (Toilet Transfer) supervision;verbal cues   Assistive Device (Toilet Transfer) walker, front-wheeled   Balance   Balance Assessment standing dynamic balance   Standing  Balance: Dynamic modified independence   Position/Device Used, Standing Balance walker, front-wheeled   Activities of Daily Living   BADL Assessment/Intervention lower body dressing   Lower Body Dressing Assessment/Training   [place] Level (Lower Body Dressing) doff;don;socks;pants/bottoms;supervision;verbal cues;minimum assist (75% patient effort)   Assistive Devices (Lower Body Dressing) reacher   Position (Lower Body Dressing) supported sitting;supported standing   Clinical Impression   Criteria for Skilled Therapeutic Interventions Met (OT) Yes, treatment indicated   OT Diagnosis decreased indep with ADLs and trsfs due to spinal surgery - lumbar fusion.   Influenced by the following impairments Post-Op pain/numbness in the L L/E   OT Problem List-Impairments impacting ADL problems related to;mobility   Assessment of Occupational Performance 3-5 Performance Deficits   Identified Performance Deficits dressing, toileting, bathing, trsfs, G/H   Planned Therapy Interventions (OT) ADL retraining   Clinical Decision Making Complexity (OT) detailed assessment/moderate complexity   Risk & Benefits of therapy have been explained evaluation/treatment results reviewed;participants included;patient   OT Total Evaluation Time   OT Eval, Moderate Complexity Minutes (97759) 15   OT Goals   Therapy Frequency (OT) One time eval and treatment   OT Predicted Duration/Target Date for Goal Attainment 01/14/24   OT Goals Lower Body Dressing;Transfers   OT: Lower Body Dressing Modified independent;Completed   OT: Transfer Modified independent;Completed   Interventions   Interventions Quick Adds Self-Care/Home Management   Self-Care/Home Management   Self-Care/Home Mgmt/ADL, Compensatory, Meal Prep Minutes (22815) 30   Symptoms Noted During/After Treatment (Meal Preparation/Planning Training) increased pain  (L L/E)   Treatment Detail/Skilled Intervention Pt sitting up in her recliner.  Reviewed spine precautions and proper body  mechanics with Pt.  Worked onL/B dressing using a reacher.  SBA initially, but by end of session was mod indep.  Worked on room trsfs using a FWW.  Pt completed trsfs to the chair, toilet, BR sink and WIS.  Initially, SBA for VC re: head over spine and proper sequencing.  By end of session, Pt was mod indep using her FWW.  Therapist also reviewed with Pt the car trsf and kitchen mobility.  Pt verbalized her understanding.  At end of session, Pt was sittig in her chair with call light in reach and bilat L/E elevated.  RN aware.   OT Discharge Planning   OT Plan D.C. OT   OT Discharge Recommendation (DC Rec) home with assist   OT Rationale for DC Rec Will have assist from S.O. at home.   OT Brief overview of current status Ptmet her OT goals.  Pt has a reacher and sock aid at home.   Total Session Time   Timed Code Treatment Minutes 30   Total Session Time (sum of timed and untimed services) 45

## 2024-01-14 NOTE — PLAN OF CARE
Goal Outcome Evaluation:    Patient states pain was covered adequately today. Liked the consistent pain regimen. Oral antibiotic.Tolerating diet.C/o nausea x 1. Tums and zofran given with relief noted. Dressing changed. PT/OT complete. Patient states she feels comfortable being independent. Adequate urine output.

## 2024-01-14 NOTE — PROGRESS NOTES
"[facility] Orthopaedics Progress Note    Chief Complaint: left-sided lower extremity pain and draining incision    Principal problem: post-op pain    Active problems:   S/p lumbar fusion   Postoperative wound dehiscence, subsequent encounter    Subjective:  Patient uncomfortably lying in bed, noting pain is an 8/10. She localizes the worst of her pain to the lateral side of her upper left leg as well as her low back at the incision. She expresses frustration at the care she has received since being admitted, noting she does not feel her pain is adequately managed. She endorses some dizziness at times as well as nausea. Denies lightheadedness, saddle anesthesia, loss of bowel/bladder function. Voids and passes gas spontaneously.     Pain: moderate and severe  Chest pain, SOB:  No      Objective:  Blood pressure 120/66, pulse 94, temperature 97.6  F (36.4  C), temperature source Oral, resp. rate 18, height 1.575 m (5' 2\"), weight 99.8 kg (220 lb), SpO2 96%, not currently breastfeeding.    Patient Vitals for the past 24 hrs:   BP Temp Temp src Pulse Resp SpO2   01/14/24 0751 120/66 97.6  F (36.4  C) Oral 94 18 96 %   01/14/24 0009 111/63 97.8  F (36.6  C) Oral 97 18 90 %   01/13/24 2013 129/72 -- -- 91 -- --   01/13/24 1542 138/73 -- -- 94 18 91 %   01/13/24 1431 134/79 98.2  F (36.8  C) Oral 98 18 94 %   01/13/24 1145 -- -- -- 94 -- 94 %   01/13/24 1130 -- -- -- 98 -- 95 %       Wt Readings from Last 4 Encounters:   01/13/24 99.8 kg (220 lb)   01/03/24 98.4 kg (217 lb)   12/21/23 99.6 kg (219 lb 8 oz)   09/13/23 99.6 kg (219 lb 8 oz)         Motor function, sensation, and circulation intact   Yes  Wound status: incisions are clean dry and intact. Yes  Calf tenderness: Bilateral  No    Pertinent Labs   Lab Results: personally reviewed.     Recent Labs   Lab Test 01/13/24  0533 01/12/24 2011 01/12/24 2008 01/04/24  0559   WBC 7.2  --  7.3 10.9   HGB 10.4*  --  9.8* 11.0*   HCT 32.7*  --  30.8* 33.0*   MCV 93  --  93 " 90     --  253 168    140  --  138       Plan: Anticoagulation protocol: mechanical and ambulation            Pain medications:  scopainmedication: oxycodone, tylenol, dilaudid and flexeril            Wound care: twice daily dry dressing changes at small open area of right sided incision            Antibiotics: prophylactic oral Keflex for next 10 days (start 1/13/24)            Disposition:  Home today vs tomorrow pending adequate pain control on po meds and therapy recs. If patient is still at [facility] on Monday, Dr. Solo will assess.            Continue cares and rehabilitation. No evidence of focal neurologic impingement on current imaging, symptoms in the left lower extremity likely secondary to transient psoas irritation from operative approach.  Would recommend continued pain control, and ambulation with PT.       Report completed by:  Gail Ruvalcaba PA-C  Date: 1/14/2024  Time: 7:22 AM

## 2024-01-14 NOTE — PROGRESS NOTES
"[facility]    Medicine Progress Note - Hospitalist Service    Date of Admission:  1/12/2024    Assessment & Plan   Acute on chronic low back pain status post recent L3/L4 fusion  Superficial wound dehiscence with postoperative hematoma versus seroma  Appreciate spine input  Wound cares as directed  Oral antibiotics initiated  PT and OT evaluation pending/likely home soon pending their recommendations  No plan for surgical intervention currently  Advance diet  Pain control -will transition back to Dilaudid as prehospitalization  We had been using oxycodone     Acute blood loss anemia  No signs of losses    Obesity     Insulin-dependent diabetes     ADD/depression    Essential hypertension    Restless leg syndrome    GERD    Hyperlipidemia          Diet: Combination Diet Regular Diet; Moderate Consistent Carb (60 g CHO per Meal) Diet; 2 gm NA Diet    DVT Prophylaxis: Pneumatic Compression Devices  Estrada Catheter: Not present  Lines: None     Cardiac Monitoring: None  Code Status: Full Code      Clinically Significant Risk Factors                  # Hypertension: Noted on problem list        # Severe Obesity: Estimated body mass index is 40.24 kg/m  as calculated from the following:    Height as of this encounter: 1.575 m (5' 2\").    Weight as of this encounter: 99.8 kg (220 lb)., PRESENT ON ADMISSION            Disposition Plan      Expected Discharge Date: 01/14/2024        Discharge Comments: CLeared by PT and pain control            Fabrizio Ramirez MD  Hospitalist Service  [facility]  Securely message with Naseem (more info)  Text page via [employer] Paging/Directory   ______________________________________________________________________    Interval History   Patient is doing okay.  We been using oxycodone for pain and she thinks Dilaudid that she was using at home was better.  Will change that today.  She is having some discomfort in her left buttock and left thigh " but tolerable.  She is not yet worked with therapy.  She not have any bowel or bladder dysfunction.  We talked about working with therapy and see how she is doing and hopefully going home tomorrow after seeing her surgeon.  She agrees with this plan.    Physical Exam   Vital Signs: Temp: 97.6  F (36.4  C) Temp src: Oral BP: 120/66 Pulse: 94   Resp: 18 SpO2: 96 % O2 Device: None (Room air)    Weight: 220 lbs 0 oz    General Appearance: No apparent distress  Respiratory: Clear to auscultation bilaterally  Cardiovascular: Regular rate and rhythm   GI: abdomen is soft and nontender  Skin: Visualized skin is normal  Other: Good eye contact with normal affect      41 MINUTES SPENT BY ME on the date of service doing chart review, history, exam, documentation & further activities per the note.      Data     I have personally reviewed the following data over the past 24 hrs:    6.5  \   10.9 (L)   / 286     N/A N/A N/A /  93   3.8 N/A N/A \       Imaging results reviewed over the past 24 hrs:   No results found for this or any previous visit (from the past 24 hour(s)).

## 2024-01-14 NOTE — PROGRESS NOTES
01/14/24 1050   Appointment Info   Signing Clinician's Name / Credentials (PT) Fallon Gan PT   Living Environment   People in Home significant other   Current Living Arrangements house   Home Accessibility stairs within home   Number of Stairs, Within Home, Primary greater than 10 stairs   Self-Care   Usual Activity Tolerance good   Current Activity Tolerance good   Equipment Currently Used at Home walker, rolling   Activity/Exercise/Self-Care Comment Reports independent with all mobility without AD at baseline.   General Information   Onset of Illness/Injury or Date of Surgery 01/12/24   Referring Physician Dr Fabrizio Ramirez   Patient/Family Therapy Goals Statement (PT) Pain control   Pertinent History of Current Problem (include personal factors and/or comorbidities that impact the POC) Admit for acute on chronic low back pain status post recent L3/L4 fusion.   Existing Precautions/Restrictions spinal   Cognition   Affect/Mental Status (Cognition) WNL   Pain Assessment   Patient Currently in Pain Yes, see Vital Sign flowsheet   Bed Mobility   Impairments Contributing to Impaired Bed Mobility pain;decreased strength   Assistive Device (Bed Mobility) other (see comments)  (none)   Comment, (Bed Mobility) Sit>supine CGA   Transfers   Impairments Contributing to Impaired Transfers pain;decreased strength   Comment, (Transfers) Sit<>stand SBA.   Gait/Stairs (Locomotion)   [place] Level (Gait) supervision   Assistive Device (Gait) walker, front-wheeled   Distance in Feet (Gait) 10'x1   Pattern (Gait) step-through   Deviations/Abnormal Patterns (Gait) [place] decreased;stride length decreased   Clinical Impression   Criteria for Skilled Therapeutic Intervention Yes, treatment indicated   PT Diagnosis (PT) Impaired functional mobility   Influenced by the following impairments pain, weakness   Functional limitations due to impairments transfers, gait, stairs   Clinical Presentation (PT Evaluation Complexity)  stable   Clinical Presentation Rationale Presents as medically diagnosed.   Clinical Decision Making (Complexity) low complexity   Planned Therapy Interventions (PT) gait training;patient/family education;stair training;transfer training;home program guidelines   Risk & Benefits of therapy have been explained care plan/treatment goals reviewed;patient   PT Total Evaluation Time   PT Eval, Low Complexity Minutes (83366) 15   Physical Therapy Goals   PT Frequency One time eval and treatment only   PT Predicted Duration/Target Date for Goal Attainment 01/14/24   PT: Transfers Modified independent;Sit to/from stand;Assistive device;Goal Met;Completed   PT: Gait Modified independent;Rolling walker;150 feet;Goal Met;Completed   PT: Stairs Supervision/stand-by assist;8 stairs;Rail on both sides;Goal Met;Completed   Therapeutic Activity   Therapeutic Activities: dynamic activities to improve functional performance Minutes (34972) 10   Symptoms Noted During/After Treatment Increased pain   Treatment Detail/Skilled Intervention Sit<>stand recliner to FWW and toilet <> FWW mod I after education/cues on safe hand placement and technique. Cues to pause once standing to ensure balance prior to amb. Sit>supine CGA with step-by-step cues on log roll technique. Cues for repositioning self once supine.   Gait Training   Gait Training Minutes (79728) 15   Symptoms Noted During/After Treatment (Gait Training) fatigue;increased pain   Treatment Detail/Skilled Intervention GAIT: Good steady pace with cues for posture, reciprocal gait and heel/toe walking pattern. STAIRS: Up/down 4 steps w/B rails x 2 SBA with cues for sequence and safety.   Distance in Feet 350'x1   [place] Level (Gait Training) independent   Physical Assistance Level (Gait Training) verbal cues   Assistive Device (Gait Training) rolling walker   Pattern Analysis (Gait Training) swing-through gait   Gait Analysis Deviations decreased step length   Impairments (Gait  Analysis/Training) pain;strength decreased   PT Discharge Planning   PT Plan d/c PT due to goals met.   PT Discharge Recommendation (DC Rec) (S)  home with assist   PT Rationale for DC Rec Pt independent with transfers and gait, good support at home.   PT Brief overview of current status Sit<>stand mod I, amb 350' with FWW mod I, up/down 4 steps w/B rails x 2 SBA.   PT Equipment Needed at Discharge walker, rolling   Total Session Time   Timed Code Treatment Minutes 25   Total Session Time (sum of timed and untimed services) 40      No pertinent family history in first degree relatives

## 2024-01-15 VITALS
RESPIRATION RATE: 17 BRPM | SYSTOLIC BLOOD PRESSURE: 102 MMHG | BODY MASS INDEX: 40.93 KG/M2 | WEIGHT: 222.44 LBS | DIASTOLIC BLOOD PRESSURE: 62 MMHG | HEART RATE: 88 BPM | HEIGHT: 62 IN | TEMPERATURE: 97.8 F | OXYGEN SATURATION: 91 %

## 2024-01-15 LAB
CRP SERPL-MCNC: 13.3 MG/L
GLUCOSE BLDC GLUCOMTR-MCNC: 110 MG/DL (ref 70–99)
HGB BLD-MCNC: 10.9 G/DL (ref 11.7–15.7)
MAGNESIUM SERPL-MCNC: 2.3 MG/DL (ref 1.7–2.3)
POTASSIUM SERPL-SCNC: 4.3 MMOL/L (ref 3.4–5.3)

## 2024-01-15 PROCEDURE — 85018 HEMOGLOBIN: CPT | Performed by: FAMILY MEDICINE

## 2024-01-15 PROCEDURE — 36415 COLL VENOUS BLD VENIPUNCTURE: CPT | Performed by: FAMILY MEDICINE

## 2024-01-15 PROCEDURE — 99239 HOSP IP/OBS DSCHRG MGMT >30: CPT | Performed by: FAMILY MEDICINE

## 2024-01-15 PROCEDURE — 86140 C-REACTIVE PROTEIN: CPT | Performed by: ORTHOPAEDIC SURGERY

## 2024-01-15 PROCEDURE — 83735 ASSAY OF MAGNESIUM: CPT | Performed by: FAMILY MEDICINE

## 2024-01-15 PROCEDURE — 250N000013 HC RX MED GY IP 250 OP 250 PS 637: Performed by: FAMILY MEDICINE

## 2024-01-15 PROCEDURE — 250N000013 HC RX MED GY IP 250 OP 250 PS 637: Performed by: INTERNAL MEDICINE

## 2024-01-15 PROCEDURE — 250N000013 HC RX MED GY IP 250 OP 250 PS 637

## 2024-01-15 PROCEDURE — 84132 ASSAY OF SERUM POTASSIUM: CPT | Performed by: FAMILY MEDICINE

## 2024-01-15 PROCEDURE — 250N000013 HC RX MED GY IP 250 OP 250 PS 637: Performed by: ORTHOPAEDIC SURGERY

## 2024-01-15 RX ORDER — HYDROMORPHONE HYDROCHLORIDE 2 MG/1
2-4 TABLET ORAL
Qty: 30 TABLET | Refills: 0 | Status: SHIPPED | OUTPATIENT
Start: 2024-01-15

## 2024-01-15 RX ORDER — ROSUVASTATIN CALCIUM 40 MG/1
40 TABLET, COATED ORAL DAILY
Qty: 30 TABLET | Refills: 0 | Status: SHIPPED | OUTPATIENT
Start: 2024-01-15 | End: 2024-04-12

## 2024-01-15 RX ORDER — IRBESARTAN 150 MG/1
150 TABLET ORAL AT BEDTIME
Qty: 90 TABLET | Refills: 1 | Status: SHIPPED | OUTPATIENT
Start: 2024-01-15 | End: 2024-09-09

## 2024-01-15 RX ORDER — SEMAGLUTIDE 0.68 MG/ML
0.5 INJECTION, SOLUTION SUBCUTANEOUS
Refills: 11 | OUTPATIENT
Start: 2024-01-15

## 2024-01-15 RX ORDER — DILTIAZEM HYDROCHLORIDE 240 MG/1
240 CAPSULE, COATED, EXTENDED RELEASE ORAL DAILY
Qty: 90 CAPSULE | Refills: 1 | Status: SHIPPED | OUTPATIENT
Start: 2024-01-15 | End: 2024-07-10

## 2024-01-15 RX ORDER — MONTELUKAST SODIUM 10 MG/1
1 TABLET ORAL AT BEDTIME
Qty: 90 TABLET | Refills: 1 | Status: SHIPPED | OUTPATIENT
Start: 2024-01-15 | End: 2024-08-11

## 2024-01-15 RX ORDER — CEPHALEXIN 500 MG/1
500 CAPSULE ORAL EVERY 6 HOURS
Qty: 32 CAPSULE | Refills: 0 | Status: SHIPPED | OUTPATIENT
Start: 2024-01-15

## 2024-01-15 RX ADMIN — ACETAMINOPHEN 650 MG: 325 TABLET ORAL at 02:51

## 2024-01-15 RX ADMIN — CEPHALEXIN 500 MG: 500 CAPSULE ORAL at 06:49

## 2024-01-15 RX ADMIN — HYDROMORPHONE HYDROCHLORIDE 4 MG: 4 TABLET ORAL at 00:12

## 2024-01-15 RX ADMIN — VORTIOXETINE 20 MG: 10 TABLET, FILM COATED ORAL at 08:37

## 2024-01-15 RX ADMIN — PANTOPRAZOLE SODIUM 40 MG: 40 TABLET, DELAYED RELEASE ORAL at 08:35

## 2024-01-15 RX ADMIN — Medication 2 TABLET: at 08:36

## 2024-01-15 RX ADMIN — ATOMOXETINE 100 MG: 25 CAPSULE ORAL at 06:55

## 2024-01-15 RX ADMIN — CEPHALEXIN 500 MG: 500 CAPSULE ORAL at 11:26

## 2024-01-15 RX ADMIN — DILTIAZEM HYDROCHLORIDE 240 MG: 120 CAPSULE, COATED, EXTENDED RELEASE ORAL at 08:36

## 2024-01-15 RX ADMIN — HYDROMORPHONE HYDROCHLORIDE 4 MG: 4 TABLET ORAL at 06:54

## 2024-01-15 RX ADMIN — ACETAMINOPHEN 650 MG: 325 TABLET ORAL at 08:35

## 2024-01-15 RX ADMIN — CALCIUM CARBONATE (ANTACID) CHEW TAB 500 MG 1000 MG: 500 CHEW TAB at 11:28

## 2024-01-15 RX ADMIN — LEVOTHYROXINE SODIUM 150 MCG: 75 TABLET ORAL at 06:49

## 2024-01-15 RX ADMIN — TOLTERODINE TARTRATE 2 MG: 2 CAPSULE, EXTENDED RELEASE ORAL at 08:36

## 2024-01-15 RX ADMIN — HYDROMORPHONE HYDROCHLORIDE 4 MG: 4 TABLET ORAL at 03:57

## 2024-01-15 RX ADMIN — VORTIOXETINE 10 MG: 10 TABLET, FILM COATED ORAL at 08:40

## 2024-01-15 RX ADMIN — HYDROMORPHONE HYDROCHLORIDE 4 MG: 4 TABLET ORAL at 11:26

## 2024-01-15 RX ADMIN — POLYETHYLENE GLYCOL 3350 17 G: 17 POWDER, FOR SOLUTION ORAL at 08:36

## 2024-01-15 RX ADMIN — CEPHALEXIN 500 MG: 500 CAPSULE ORAL at 00:08

## 2024-01-15 RX ADMIN — TIMOLOL MALEATE 1 DROP: 5 SOLUTION OPHTHALMIC at 08:52

## 2024-01-15 ASSESSMENT — ACTIVITIES OF DAILY LIVING (ADL)
ADLS_ACUITY_SCORE: 21
ADLS_ACUITY_SCORE: 22
ADLS_ACUITY_SCORE: 21

## 2024-01-15 NOTE — PLAN OF CARE
Discharge plan reviewed with patient. Verbalized understanding. Questions answered. Copy of AVS given. Discharged home with family provide transportation.     Problem: Adult Inpatient Plan of Care  Goal: Readiness for Transition of Care  Outcome: Adequate for Care Transition

## 2024-01-15 NOTE — PLAN OF CARE
Goal Outcome Evaluation:     VSS on RA. Pt is alert and oriented. Calls appropriately for needs. IND.  Pt tolerating regular diet. No IV access. ACHS glucose checks. Voids spontaneously. C/o pain which was relieved by prn dilaudid and scheduled tylenol. K and Mg protocol, AM recheck.

## 2024-01-15 NOTE — DISCHARGE SUMMARY
[facility]  Hospitalist Discharge Summary      Date of Admission:  1/12/2024  Date of Discharge:  1/15/2024  Discharging Provider: Fabrizio Ramirez MD  Discharge Service: Hospitalist Service    Discharge Diagnoses   Acute on chronic low back pain status post recent L3-L4 fusion  Superficial wound dehiscence with postoperative hematoma versus seroma  Acute blood loss anemia  Morbid obesity  Type 2 diabetes  ADD/depression  Essential hypertension   GERD  Hyperlipidemia  Restless leg syndrome        Follow-ups Needed After Discharge   Follow-up Appointments     Follow Up Care      Follow-up with Dr. Solo/ MARIA E De Guzman in 14 days. Call   [phone] to make or change your appointment, or to connect with an   on-call provider after hours.        Follow-up with Dr. Solo for wound check in 4 days        Discharge Disposition   Discharged to home  Condition at discharge: Stable    Hospital Course   68-year-old female with history of diabetes and recent lumbar spine surgery presented to the ER complaining of back pain radiating to her left groin and thigh as well as some surgical incision with discharge.  MRI was obtained and the patient was admitted.    Acute on chronic low back pain status post recent L3-L4 fusion  Superficial wound dehiscence with postoperative hematoma versus seroma  Patient was admitted and not started on antibiotics until seen by surgery.  He started her on some oral Keflex.  They did not feel that there was any major infection requiring surgical debridement or intervention.  Patient remained in the hospital and worked with PT and OT.  She was seen by the actual surgeon that performed the procedure felt the patient was good for discharge.  Plan is to follow-up in just a few days for a wound check.  Patient will be on Keflex.  Back pain numbness etc. improved dramatically.  She passed therapy.  Inflammatory markers were elevated but trending downwards.  White  "count was reassuringly normal.    Acute blood loss anemia  This was from the previous procedure and hemoglobin was stable and trending up without signs of losses at discharge.        Consultations This Hospital Stay   ORTHOPEDIC SURGERY IP CONSULT  PHYSICAL THERAPY ADULT IP CONSULT  OCCUPATIONAL THERAPY ADULT IP CONSULT    Code Status   Full Code    Time Spent on this Encounter   IFabrizio MD, personally saw the patient today and spent greater than 30 minutes discharging this patient.       Fabrizio Ramirez MD  [address] [address]  Phone: [phone]  Fax: [phone]  ______________________________________________________________________    Physical Exam   Vital Signs: Temp: 97.8  F (36.6  C) Temp src: Axillary BP: 102/62 Pulse: 88   Resp: 17 SpO2: 91 % O2 Device: None (Room air)    Weight: 222 lbs 7.11 oz  General Appearance: No apparent distress resting comfortably in bed  Respiratory: Clear to auscultation bilaterally  Cardiovascular: Regular rate and rhythm  GI: Abdomen is soft and nontender with positive bowel sounds  Skin: Visualized skin is normal  Other: Good eye contact and normal affect       Primary Care Physician   Angela Mcintyre    Discharge Orders      Primary Care - Care Coordination Referral      Reason for your hospital stay    lumbar fusion with wound dehiscence     Brief Discharge Instructions    1. Sitting as tolerated   2. Avoid excessive forward or side bending, twisting, pushing, pulling, or reaching   3. No lifting greater than 5 pounds     When to call - Contact Surgeon Team    You may experience symptoms that require follow-up before your scheduled appointment. Refer to the \"Stoplight Tool\" for instructions on when to contact your Surgeon Team if you are concerned about pain control, blood clots, constipation, or if you are unable to urinate.     When to call - Reach out to Urgent Care    If you " are not able to reach your Surgeon Team and you need immediate care, go to the Orthopedic Walk-in Clinic or Urgent Care at your Surgeon's office.  Do NOT go to the Emergency Room unless you have shortness of breath, chest pain, or other signs of a medical emergency.     When to call - Reasons to Call 911    Call 911 immediately if you experience sudden-onset chest pain, arm weakness/numbness, slurred speech, or shortness of breath     Discharge Instruction - Breathing exercises    Perform breathing exercises using your Incentive Spirometer 10 times per 2 hours while awake for 2 weeks.     Symptoms - Fever Management    A low grade fever can be expected after surgery.  Use acetaminophen (TYLENOL) as needed for fever management.  Contact your Surgeon Team if you have a fever greater than 101.5 F, chills, and/or night sweats.     Symptoms - Constipation management    Constipation (hard, dry bowel movements) is expected after surgery due to the combination of being less active, the anesthetic, and the opioid pain medication.  You can do the following to help reduce constipation:  ~  FLUIDS:  Drink clear liquids (water or Gatorade), or juice (apple/prune).  ~  DIET:  Eat a fiber rich diet.    ~  ACTIVITY:  Get up and move around several times a day.  Increase your activity as you are able.  MEDICATIONS:  Reduce the risk of constipation by starting medications before you are constipated.  You can take Miralax   (1 packet as directed) and/or a stool softener (Senokot 1-2 tablets 1-2 times a day).  If you already have constipation and these medications are not working, you can get magnesium citrate and use as directed.  If you continue to have constipation you can try an over the counter suppository or enema.  Call your Surgeon Team if it has been greater than 3 days since your last bowel movement.     Symptoms - Reduced Urine Output    Changes in the amount of fluids you drank before and after surgery may result in  problems urinating.  It is important to stay well-hydrated after surgery and drink plenty of water. If it has been greater than 8 hours since you have urinated despite drinking plenty of water, call your Surgeon Team.     Activity - Exercises to prevent blood clots    Unless otherwise directed by your Surgeon team, perform the following exercises at least three times per day for the first four weeks after surgery to prevent blood clots in your legs: 1) Point and flex your feet (Ankle Pumps), 2) Move your ankle around in big circles, 3) Wiggle your toes, 4) Walk, even for short distances, several times a day, will help decrease the risk of blood clots.     Comfort and Pain Management - Pain after Surgery    Pain after surgery is normal and expected.  You will have some amount of pain for several weeks after surgery.  Your pain will improve with time.  There are several things you can do to help reduce your pain including: rest, ice,  and using pain medications as needed. Contact Dr. Solo's Team if you have pain that persists or worsens after surgery despite rest, ice, and taking your medication(s) as prescribed. Contact your Surgeon Team if you have new numbness, tingling, or weakness in your arms or legs.     Comfort and Pain Management - Swelling after Surgery    Swelling and/or bruising around the surgical site is common and may persist for several months after surgery. We recommend frequent icing over the operative area. Contact your Surgeon Team if your swelling increases and is NOT associated with an increase in your activity level, or if your swelling increases and is associated with redness and pain.     Comfort and Pain Management - Cold therapy    Ice can be used to control swelling and discomfort after surgery. Place a thin towel over your operative site and apply the ice pack overtop. Leave ice pack in place for 20 minutes, then remove for 20 minutes. Repeat this 20 minutes on/20 minutes off routine  as often as tolerated.     Medication Instructions - Acetaminophen (TYLENOL) Instructions    You were discharged with acetaminophen (TYLENOL) for pain management after surgery. Acetaminophen most effectively manages pain symptoms when it is taken on a schedule without missing doses (every four, six, or eight hours). Your Provider will prescribe a safe daily dose between 3000 - 4000 mg.  Do NOT exceed this daily dose. Most patients use acetaminophen for pain control for the first four weeks after surgery.  You can wean from this medication as your pain decreases.     Medication Instructions - NSAID Instructions    Do NOT use antiinflammatories x 6 weeks.  Some common anti-inflammatories include: ibuprofen (ADVIL, MOTRIN), naproxen (ALEVE, NAPROSYN), celecoxib (CELEBREX).     Medication Instructions - Opioid Instructions (1 - 2 tablets Q 4-6 hours, MAX 6 tablets)    You were discharged with an opioid medication (hydromorphone, oxycodone, hydrocodone, or tramadol). This medication should only be taken for breakthrough pain that is not controlled with acetaminophen (TYLENOL). If you rate your pain less than 3 you do not need this medication.  Pain rating 0-3:  You do not need this medication.  Pain rating 4-6:  Take 1 tablet every 4-6 hours as needed  Pain rating 7-10:  Take 2 tablets every 4-6 hours as needed.  Do not exceed 6 tablets per day     Medication Instructions - Opioids - Tapering Instructions    In the first three days following surgery, your symptoms may warrant use of the narcotic pain medication every four to six hours as prescribed. This is normal. As your pain symptoms improve, focus your efforts on decreasing (tapering) use of narcotic medications. The most successful tapering strategy is to first, decrease the number of tablets you take every 4-6 hours to the minimum prescribed. Then, increase the amount of time between doses.  For example:  First, taper to   or 1 tablet every 4-6 hours.  Then, taper  to   or 1 tablet every 6-8 hours.  Then, taper to   or 1 tablet every 8-10 hours.  Then, taper to   or 1 tablet every 10-12 hours.  Then, taper to   or 1 tablet at bedtime.  The bedtime dose can help with comfort during sleep and is typically the last dose to be discontinued after surgery.     Medication Instructions - Muscle relaxant Medication Instructions    You were discharged with a muscle relaxer medication that can be used in conjunction with acetaminophen (TYLENOL) and the narcotic medication to manage pain symptoms. Take the muscle relaxant medication exactly as directed.     Follow Up Care    Follow-up with Dr. Solo/ MARIA E De Guzman in 14 days. Call [phone] to make or change your appointment, or to connect with an on-call provider after hours.     Activity    1. Sitting and walking as tolerated   2. Avoid excessive forward or side bending, twisting, pushing, pulling, or reaching   3. No lifting greater than 5 pounds     Return to Driving    Return to driving - Driving is NOT permitted until directed by your provider. Under no circumstance are you permitted to drive while using narcotic pain medications.     Weight bearing as tolerated    Weight bearing as tolerated. Avoid lifting greater than 5lbs, excessive bending, lifting or twisting.     Other Orthopedic Specialty Device    Wear Brace whenever out of bed.     Dressing / Wound Care - Wound    You have a clean dressing on your surgical wound. Change dressing every day AFTER you shower and replace with dry gauze and paper tape. Cover with plastic in the shower to keep dressing dry. No ointments or lotions on incision. Avoid tub baths, swimming pools, and hot tubs for at least 4 weeks.  Contact Dr. Solo's Team if you have increased redness, warmth around the surgical wound, and/or purulent drainage from the surgical wound.     Dressing / Wound care - Shower with wound/dressing covered    You must COVER your dressing or incision with eulalia  wrap (or any other non-permeable covering) to allow the incision to remain dry while showering.  You may shower 2 days after surgery as long as the surgical wound stays dry. Continue to cover your dressing or incision for showering until your first office visit.  You are strictly prohibited from soaking   or submerging the surgical wound underwater.     Dressing / Wound Care - NO Tub Bathing    Tub bathing, swimming, or any other activities that will cause your incision to be submerged in water should be avoided until allowed by your Surgeon.     Discharge Instruction - Regular Diet Adult    Return to your pre-surgery diet unless instructed otherwise       Significant Results and Procedures   Most Recent 3 CBC's:  Recent Labs   Lab Test 01/15/24  0700 01/14/24  0743 01/13/24  0533 01/12/24 2008   WBC  --  6.5 7.2 7.3   HGB 10.9* 10.9* 10.4* 9.8*   MCV  --  92 93 93   PLT  --  286 263 253     Most Recent 3 BMP's:  Recent Labs   Lab Test 01/15/24  0700 01/15/24  0648 01/14/24  2033 01/14/24  1724 01/14/24  1137 01/14/24  0743 01/13/24  0959 01/13/24  0533 01/13/24  0048 01/12/24 2011 01/04/24  1543 01/04/24  0559   NA  --   --   --   --   --   --   --  140  --  140  --  138   POTASSIUM 4.3  --   --   --   --  3.8  --  4.0  --  4.1  --  3.9   CHLORIDE  --   --   --   --   --   --   --  104  --  104  --  104   CO2  --   --   --   --   --   --   --  28  --  28  --  27   BUN  --   --   --   --   --   --   --  8.5  --  9.2  --  9.5   CR  --   --   --   --   --   --   --  0.82  --  0.80  --  0.59   ANIONGAP  --   --   --   --   --   --   --  8  --  8  --  7   RUBEN  --   --   --   --   --   --   --  8.8  --  9.2  --  8.6*   GLC  --  110* 102* 121*   < >  --    < > 91   < > 89   < > 189*    < > = values in this interval not displayed.     Most Recent 2 LFT's:  Recent Labs   Lab Test 01/13/24  0533 01/12/24 2011   AST 22 23   ALT 12 12   ALKPHOS 97 94   BILITOTAL 0.4 0.4     Most Recent 3 INR's:No lab results found.,    Results for orders placed or performed during the hospital encounter of 01/12/24   Lumbar spine MRI w/o contrast    Narrative    EXAM: MR LUMBAR SPINE W/O CONTRAST  LOCATION: [facility]  DATE: 1/12/2024    INDICATION: Mid lumbar back pain, LLQ abdominal pain s/p posterior L3-L4 fusion 01/03/2024 with wound dehiscence  COMPARISON:  CT abdomen pelvis 01/12/2024, intraoperative fluoroscopy 01/03/2024.  TECHNIQUE: Routine Lumbar Spine MRI without IV contrast.    FINDINGS:   Nomenclature is based on 5 lumbar type vertebral bodies with L5-S1 defined on image 5 of series 7. Alignment is unchanged with grade 1 degenerative anterolisthesis at L4-L5. Osseous fusion of L2-L3.  Recent L3-L4 PLIF and XLIF.  No suspicious marrow   signal abnormality. Normal distal spinal cord and cauda equina with conus medullaris at L2.     Prevertebral soft tissues are unremarkable. Postoperative changes of the dorsal soft tissues with small sterility indeterminate collections at the postoperative tracts. Intra-abdominal structures better assessed on same day CT abdomen pelvis.    T12-L1: Circumferential disc and osteophyte. Severe disc height loss. No significant facet arthropathy. No significant canal or foraminal stenosis.    L1-L2: Mild diffuse bulge. Mild disc height loss. Mild bilateral facet arthropathy. No significant canal or foraminal stenosis.    L2-L3: Osseous fusion. No significant canal or foraminal stenosis.     L3-L4: Postoperative level with posterior and interbody fusion. No convincing high-grade canal stenosis. Mild right foraminal stenosis.    L4-L5:  Postoperative level with prior laminectomy. Anterolisthesis. Circumferential osteophytic spurring. Severe disc height loss. Mild to moderate bilateral facet arthropathy. No significant canal stenosis. Moderate bilateral foraminal stenosis.    L5-S1:  Postoperative level with prior laminotomy. Circumferential disc and osteophyte. Severe disc height  loss. Moderate bilateral facet arthropathy with ligamentum flavum hypertrophy. No significant canal stenosis. Mild bilateral foraminal stenosis.      Impression    IMPRESSION:  1.  Recent postoperative changes from L3-L4 PLIF, XLIF, and laminotomy. Small sterility indeterminate superficial fluid collections along the postoperative tracts L3-L4, favor postoperative hematoma/seroma in the absence of infectious symptoms.   Otherwise, no unexpected findings.  2.  Multilevel postoperative and advanced degenerative changes without high-grade canal stenosis.  3.  Moderate bilateral L4-L5 foraminal stenosis.   CT Abdomen Pelvis w/o Contrast    Narrative    EXAM: CT ABDOMEN PELVIS W/O CONTRAST  LOCATION: [facility]  DATE: 1/12/2024    INDICATION: Left lower quadrant abdominal pain. Left hip pain. Wound dehiscence.  COMPARISON: None.  TECHNIQUE: CT scan of the abdomen and pelvis was performed without IV contrast. Multiplanar reformats were obtained. Dose reduction techniques were used.  CONTRAST: None.    FINDINGS:   LOWER CHEST: Small hiatal hernia. Bibasilar atelectasis/scarring.    HEPATOBILIARY: No calcified gallstones or biliary ductal dilation.    PANCREAS: Normal.    SPLEEN: Normal.    ADRENAL GLANDS: Normal.    KIDNEYS/BLADDER: Small parapelvic right renal cyst, which does not require follow-up. No urinary calculi or hydronephrosis. Normal bladder.    BOWEL: No bowel obstruction or inflammation. Normal appendix. Previous sigmoid resection with a patent anastomosis. Pancolonic diverticulosis. No inflamed colonic diverticuli. Moderate amount stool throughout the colon.    LYMPH NODES: No enlarged lymph nodes.    VASCULATURE: No abdominal aortic aneurysm. No retroperitoneal hematoma.    PELVIC ORGANS: Normal.    MUSCULOSKELETAL: Osseous fusion of L2-L3 with posterior spinal fusion hardware at L3-L4. Multilevel degenerative changes of the spine. Ill-defined subcutaneous edema within the left  flank and posterior paraspinal tissues related to the recent surgery. No   fluid collections. Tiny fat-containing periumbilical hernia.      Impression    IMPRESSION:     1.  No acute abnormality or specific cause of abdominal pain are identified.    2.  Subcutaneous edema within the left flank and in the posterior paraspinal tissues related to the recent surgery. No fluid collections. No retroperitoneal hematoma.    3.  Moderate amount of stool throughout the colon, suggesting constipation.    4.  Pancolonic diverticulosis. No inflamed colonic diverticuli.       Discharge Medications   Current Discharge Medication List        START taking these medications    Details   cephALEXin (KEFLEX) 500 MG capsule Take 1 capsule (500 mg) by mouth every 6 hours  Qty: 32 capsule, Refills: 0    Associated Diagnoses: Spinal stenosis, lumbar region, without neurogenic claudication           CONTINUE these medications which have CHANGED    Details   HYDROmorphone (DILAUDID) 2 MG tablet Take 1-2 tablets (2-4 mg) by mouth every 3 hours as needed for moderate to severe pain  Qty: 30 tablet, Refills: 0    Associated Diagnoses: Lumbar radiculopathy           CONTINUE these medications which have NOT CHANGED    Details   acetaminophen (TYLENOL) 500 MG tablet Take 1,000 mg by mouth 2 times daily      !! atomoxetine (STRATTERA) 100 MG capsule Take 100 mg by mouth every morning      !! atomoxetine (STRATTERA) 60 MG capsule Take 60 mg by mouth At Bedtime      brimonidine (ALPHAGAN) 0.2 % ophthalmic solution Place 1 drop Into the left eye 2 times daily      cyclobenzaprine (FLEXERIL) 10 MG tablet Take 10 mg by mouth at bedtime      diltiazem ER COATED BEADS (CARDIZEM CD/CARTIA XT) 240 MG 24 hr capsule Take 1 capsule (240 mg) by mouth daily **Labs due for further refills [phone]**  Qty: 90 capsule, Refills: 0    Associated Diagnoses: Essential hypertension, benign      estradiol (VAGIFEM) 10 MCG TABS vaginal tablet Place 10 mcg vaginally  as needed (Twice weekly as needed) Twice weekly as needed      eszopiclone (LUNESTA) 3 MG tablet TAKE 1 TABLET BY MOUTH AT BEDTIME AS NEEDED FOR SLEEP  Refills: 2      fish oil-omega-3 fatty acids 1000 MG capsule Take 1 capsule by mouth every morning      gabapentin (NEURONTIN) 300 MG capsule Take 300 mg by mouth at bedtime      irbesartan (AVAPRO) 150 MG tablet Take 1 tablet (150 mg) by mouth At Bedtime Please complete ordered labs for further refills.  Qty: 90 tablet, Refills: 0    Associated Diagnoses: Essential hypertension, benign      latanoprost (XALATAN) 0.005 % ophthalmic solution Place 1 drop into both eyes At Bedtime      levothyroxine (SYNTHROID) 150 MCG tablet Take 150 mcg by mouth every morning Brand name product only per endocrinologist      lurasidone (LATUDA) 80 MG TABS tablet Take 80 mg by mouth at bedtime      montelukast (SINGULAIR) 10 MG tablet Take 1 tablet (10 mg) by mouth At Bedtime *Due for office visit 8/2023, please call clinic to schedule*  Qty: 90 tablet, Refills: 0    Associated Diagnoses: Persistent cough      multivitamin, therapeutic with minerals (THERA-VIT-M) TABS Take 1 tablet by mouth daily  Qty: 90 each, Refills: 3    Associated Diagnoses: Esophageal reflux      OYSCO 500 + D 500-200 MG-UNIT tablet TAKE 2 TABLETS BY MOUTH TWICE A DAY      pantoprazole (PROTONIX) 40 MG EC tablet Take 1 tablet (40 mg) by mouth 2 times daily  Qty: 180 tablet, Refills: 2    Associated Diagnoses: Gastroesophageal reflux disease without esophagitis      polyethylene glycol (MIRALAX) powder Take 17 g by mouth daily  Qty: 119 g, Refills: 2    Associated Diagnoses: Constipation      polyethylene glycol 400 (BLINK TEARS) 0.25 % SOLN ophthalmic solution Place 1-2 drops into both eyes daily as needed for dry eyes      QUEtiapine (SEROQUEL XR) 150 MG TB24 24 hr tablet Take 150 mg by mouth at bedtime      rosuvastatin (CRESTOR) 40 MG tablet Take 1 tablet (40 mg) by mouth daily Please complete ordered labs for  further refills.  Qty: 90 tablet, Refills: 0    Associated Diagnoses: Mixed hyperlipidemia      semaglutide (OZEMPIC, 0.25 OR 0.5 MG/DOSE,) 2 MG/1.5ML SOPN pen Inject 0.5 mg Subcutaneous every 7 days      solifenacin (VESICARE) 5 MG tablet Take 1 tablet (5 mg) by mouth daily * SCHEDULE CLINIC APPT.*  Qty: 30 tablet, Refills: 0    Comments: Please have patient call [phone] to schedule a follow up visit with Dr. Bridget Prescott to receive additional refills  Associated Diagnoses: Urinary urgency      timolol maleate (TIMOPTIC) 0.5 % ophthalmic solution PLACE 1 DROP INTO LEFT EYE ONCE DAILY.      vitamin  B complex with vitamin C (STRESS TAB) TABS Take 1 tablet by mouth daily  Qty: 90 tablet, Refills: 1    Associated Diagnoses: Gastroesophageal reflux disease without esophagitis      !! vortioxetine (TRINTELLIX) 10 MG tablet Take 10 mg by mouth daily 30mg total dose      !! vortioxetine (TRINTELLIX) 20 MG tablet Take 20 mg by mouth daily 30mg total dose       !! - Potential duplicate medications found. Please discuss with provider.        STOP taking these medications       senna-docusate (SENOKOT-S/PERICOLACE) 8.6-50 MG tablet Comments:   Reason for Stopping:             Allergies   Allergies   Allergen Reactions    Contrast Dye Shortness Of Breath    Dust Mite Extract Other (See Comments)     Sneezing    Dust Mites Other (See Comments)     Sneezing around dogs and cats    Hydrochlorothiazide     Iodinated Contrast Media     Latex     Latex     Lipitor [Atorvastatin Calcium]      myalgias    Oxycodone-Acetaminophen Nausea and Vomiting    Sulfa Antibiotics      arthralgias    Vicodin [Hydrocodone-Acetaminophen]      Nausea, vomiting    Wound Dressing Adhesive      PN: And type of tape with adhesive other than silk tape

## 2024-01-15 NOTE — TELEPHONE ENCOUNTER
irbesartan (AVAPRO) 150 MG tablet 90 tablet 0 9/28/2023     rosuvastatin (CRESTOR) 40 MG tablet 90 tablet 0 9/28/2023     montelukast (SINGULAIR) 10 MG tablet 90 tablet 0 7/3/2023     diltiazem ER COATED BEADS (CARDIZEM CD/CARTIA XT) 240 MG 24 hr capsule 90 capsule 0 10/31/2023     cyclobenzaprine (FLEXERIL) 10 MG tablet     Last Office Visit : 9/13/2023  [facility] [place]     Shalonda Taylor MD     Future Office visit:  0    Routing refill request to provider for review/approval because:  Crestor: LDL overdue, lipid panel not ordered, already given 90 day kaleb refill  Flexeril not on protocol

## 2024-01-15 NOTE — PROGRESS NOTES
"[facility] Progress Note      Subjective:    The patient is doing well this morning.  She reports no fevers or chills, and has had significant improvement in her left thigh pain.  Her back does not hurt.  No worsening of scant serosanguineous drainage per report.  Patient feels ready to go home.    Pain: minimal      Objective:  Blood pressure 111/63, pulse 90, temperature 97.9  F (36.6  C), temperature source Oral, resp. rate 16, height 1.575 m (5' 2\"), weight 100.9 kg (222 lb 7.1 oz), SpO2 93%, not currently breastfeeding.    Patient Vitals for the past 24 hrs:   BP Temp Temp src Pulse Resp SpO2 Weight   01/15/24 0620 -- -- -- -- -- -- 100.9 kg (222 lb 7.1 oz)   01/15/24 0000 111/63 97.9  F (36.6  C) Oral 90 16 93 % --   01/14/24 1524 98/56 97.9  F (36.6  C) Oral 82 18 93 % --   01/14/24 0751 120/66 97.6  F (36.4  C) Oral 94 18 96 % --       Wt Readings from Last 4 Encounters:   01/15/24 100.9 kg (222 lb 7.1 oz)   01/03/24 98.4 kg (217 lb)   12/21/23 99.6 kg (219 lb 8 oz)   09/13/23 99.6 kg (219 lb 8 oz)       Output by Drain (mL) 01/13/24 0700 - 01/13/24 1459 01/13/24 1500 - 01/13/24 2259 01/13/24 2300 - 01/14/24 0659 01/14/24 0700 - 01/14/24 1459 01/14/24 1500 - 01/14/24 2259 01/14/24 2300 - 01/15/24 0659 01/15/24 0700 - 01/15/24 0721   Patient has no LDAs of requested type attached.        Motor function, sensation, and circulation intact   Yes  Wound status: incisions are clean dry and intact. Yes    Pertinent Labs   Lab Results: personally reviewed.     Recent Labs   Lab Test 01/15/24  0700 01/14/24  0743 01/13/24  0533 01/12/24 2011 01/12/24 2008 01/04/24  0559   WBC  --  6.5 7.2  --  7.3 10.9   HGB 10.9* 10.9* 10.4*  --  9.8* 11.0*   HCT  --  33.5* 32.7*  --  30.8* 33.0*   MCV  --  92 93  --  93 90   PLT  --  286 263  --  253 168   NA  --   --  140 140  --  138       Plan: Notes: From my standpoint, the patient seems ready to discharge.  I am certainly supportive of sending her home on " continued Keflex, and instructed her to keep the wound covered as long as there is drainage.  She should inform me if there is any change in the character of the drainage, and we will see her back on Friday for a wound check.            Anticoagulation protocol: Mechanical            Pain medications:  scopainmedication: oxycodone and tylenol            Weight bearing status:  WBAT, no heavy lifting, twisting, or bending            Disposition: Likely home later today.  Patient will return to our clinic for a wound check on Friday.            Continue cares and rehabilitation     Report completed by:  Bandar Solo MD  Date: 1/15/2024  Time: 7:21 AM

## 2024-01-15 NOTE — PROGRESS NOTES
Care Management Discharge Note    Discharge Date: 01/15/2024       Discharge Disposition:  home no needs  Discharge Services:  n/a  Discharge DME:  n/a    Discharge Transportation: family or friend will provide    Private pay costs discussed: Not applicable    Education Provided on the Discharge Plan:  yes  Persons Notified of Discharge Plans: yes  Patient/Family in Agreement with the Plan: yes     Handoff Referral Completed: Yes    Additional Information:  SW completed chart assessment; appears to be independent at baseline. Anticipate no needs from care management at discharge. Pt to follow with PCP/surgery if needs arise post discharge. Friend or family to transport.    YARA Song  1/15/2024

## 2024-01-15 NOTE — PLAN OF CARE
Goal Outcome Evaluation:    Patient vital signs are at baseline: Yes  Patient able to ambulate as they were prior to admission or with assist devices provided by therapies during their stay:  Yes  Patient MUST void prior to discharge:  Yes  Patient able to tolerate oral intake:  Yes  Pain has adequate pain control using Oral analgesics:  Yes  Does patient have an identified :  Yes  Has goal D/C date and time been discussed with patient:  Yes     Patient is alert & oriented. Vitally stable on room air. Pain well managed with PO dilaudid & Tylenol. Back dressing is clean dry and intact. Independent in room and calls appropriately. No IV access.

## 2024-01-16 RX ORDER — CYCLOBENZAPRINE HCL 10 MG
10 TABLET ORAL AT BEDTIME
Qty: 90 TABLET | Refills: 1 | Status: SHIPPED | OUTPATIENT
Start: 2024-01-16 | End: 2024-08-02

## 2024-01-19 ENCOUNTER — TRANSFERRED RECORDS (OUTPATIENT)
Dept: HEALTH INFORMATION MANAGEMENT | Facility: CLINIC | Age: 69
End: 2024-01-19
Payer: COMMERCIAL

## 2024-02-01 ENCOUNTER — PATIENT OUTREACH (OUTPATIENT)
Dept: CARE COORDINATION | Facility: CLINIC | Age: 69
End: 2024-02-01
Payer: COMMERCIAL

## 2024-02-01 NOTE — LETTER
PRIMARY CARE CARE COORDINATION   [address] [address]    February 21, 2024    Dara Lamb  [address] [id number]      Dear Dara,        I am a clinic care coordinator who works with Angela Mcintyre MD with the Primary Care clinic at the [facility] I have been trying to reach you recently to introduce Clinic Care Coordination. I recently tried to call and was unable to reach you. Below is a description of clinic care coordination and how I can further assist you.       The clinic care coordinator nurse is a nurse who understands the health care system. The goal of clinic care coordination is to help you manage your health and improve access to the health care system. Our team works alongside your provider to assist you in determining your health and social needs. We can help you obtain health care and community resources, providing you with necessary information and education. We can work with you through any barriers and develop a care plan that helps coordinate and strengthen the communication between you and your care team. Our services are voluntary and are offered without charge to you personally.    Please feel free to contact me with any questions or concerns regarding care coordination and what we can offer.      We are focused on providing you with the highest-quality healthcare experience possible.    Sincerely,     TAMIKO Rojas Care Manager  Primary Care Clinic   Phone: [phone]  Fax: [phone]

## 2024-02-01 NOTE — PROGRESS NOTES
Clinic Care Coordination Contact  [facility]/Voicemail    Clinical Data: Care Coordinator Outreach    Outreach Documentation Number of Outreach Attempt   2/13/2024  11:38 AM 1       Left message on patient's voicemail with call back information and requested return call. RN will send Sequencehart message for second outreach attempt to patient.     Plan: Care Coordinator will await for patient's reply on E-Buyhart for further outreach.    DEBRA DAWKINS RN on 2/13/2024 at 11:40 AM

## 2024-02-06 ENCOUNTER — DOCUMENTATION ONLY (OUTPATIENT)
Dept: ORTHOPEDICS | Facility: CLINIC | Age: 69
End: 2024-02-06
Payer: COMMERCIAL

## 2024-02-06 DIAGNOSIS — R26.89 IMPAIRED GAIT AND MOBILITY: Primary | ICD-10-CM

## 2024-02-21 NOTE — PROGRESS NOTES
Clinic Care Coordination Contact  [facility]/Voicemail    Clinical Data: Care Coordinator Outreach    Outreach Documentation Number of Outreach Attempt   2/13/2024  11:38 AM 1   2/21/2024   2:16 PM 2       Left message on patient's voicemail with call back information and requested return call.    Plan: Care Coordinator will send unable to contact letter with care coordinator contact information via mail. Care Coordinator will do no further outreaches at this time.    DEBRA DAWKINS RN on 2/21/2024 at 2:17 PM

## 2024-02-23 ENCOUNTER — TRANSFERRED RECORDS (OUTPATIENT)
Dept: HEALTH INFORMATION MANAGEMENT | Facility: CLINIC | Age: 69
End: 2024-02-23
Payer: COMMERCIAL

## 2024-03-01 ENCOUNTER — LAB (OUTPATIENT)
Dept: LAB | Facility: CLINIC | Age: 69
End: 2024-03-01
Payer: COMMERCIAL

## 2024-03-01 DIAGNOSIS — R73.03 PREDIABETES: ICD-10-CM

## 2024-03-01 DIAGNOSIS — Z00.00 ENCOUNTER FOR ANNUAL PHYSICAL EXAM: ICD-10-CM

## 2024-03-01 DIAGNOSIS — I10 ESSENTIAL HYPERTENSION, BENIGN: ICD-10-CM

## 2024-03-01 LAB
ALBUMIN SERPL BCG-MCNC: 4.1 G/DL (ref 3.5–5.2)
ALP SERPL-CCNC: 117 U/L (ref 40–150)
ALT SERPL W P-5'-P-CCNC: 14 U/L (ref 0–50)
ANION GAP SERPL CALCULATED.3IONS-SCNC: 13 MMOL/L (ref 7–15)
AST SERPL W P-5'-P-CCNC: 24 U/L (ref 0–45)
BILIRUB SERPL-MCNC: 0.5 MG/DL
BUN SERPL-MCNC: 12.8 MG/DL (ref 8–23)
CALCIUM SERPL-MCNC: 9.2 MG/DL (ref 8.8–10.2)
CHLORIDE SERPL-SCNC: 105 MMOL/L (ref 98–107)
CREAT SERPL-MCNC: 0.69 MG/DL (ref 0.51–0.95)
DEPRECATED HCO3 PLAS-SCNC: 23 MMOL/L (ref 22–29)
EGFRCR SERPLBLD CKD-EPI 2021: >90 ML/MIN/1.73M2
GLUCOSE SERPL-MCNC: 102 MG/DL (ref 70–99)
HBA1C MFR BLD: 5.8 %
POTASSIUM SERPL-SCNC: 4.6 MMOL/L (ref 3.4–5.3)
PROT SERPL-MCNC: 7.4 G/DL (ref 6.4–8.3)
SODIUM SERPL-SCNC: 141 MMOL/L (ref 135–145)

## 2024-03-01 PROCEDURE — 84450 TRANSFERASE (AST) (SGOT): CPT

## 2024-03-01 PROCEDURE — 36415 COLL VENOUS BLD VENIPUNCTURE: CPT

## 2024-03-01 PROCEDURE — 83036 HEMOGLOBIN GLYCOSYLATED A1C: CPT

## 2024-03-01 PROCEDURE — 82465 ASSAY BLD/SERUM CHOLESTEROL: CPT

## 2024-03-01 PROCEDURE — 82247 BILIRUBIN TOTAL: CPT

## 2024-03-02 LAB
CHOLEST SERPL-MCNC: 171 MG/DL
FASTING STATUS PATIENT QL REPORTED: NO
HDLC SERPL-MCNC: 93 MG/DL
LDLC SERPL CALC-MCNC: 68 MG/DL
NONHDLC SERPL-MCNC: 78 MG/DL
TRIGL SERPL-MCNC: 52 MG/DL

## 2024-03-13 ENCOUNTER — MYC REFILL (OUTPATIENT)
Dept: INTERNAL MEDICINE | Facility: CLINIC | Age: 69
End: 2024-03-13
Payer: COMMERCIAL

## 2024-03-13 DIAGNOSIS — E66.09 CLASS 2 OBESITY DUE TO EXCESS CALORIES WITH BODY MASS INDEX (BMI) OF 36.0 TO 36.9 IN ADULT, UNSPECIFIED WHETHER SERIOUS COMORBIDITY PRESENT: Primary | ICD-10-CM

## 2024-03-13 DIAGNOSIS — E66.812 CLASS 2 OBESITY DUE TO EXCESS CALORIES WITH BODY MASS INDEX (BMI) OF 36.0 TO 36.9 IN ADULT, UNSPECIFIED WHETHER SERIOUS COMORBIDITY PRESENT: Primary | ICD-10-CM

## 2024-03-13 RX ORDER — SEMAGLUTIDE 1.34 MG/ML
0.5 INJECTION, SOLUTION SUBCUTANEOUS
Qty: 1.5 ML | Status: CANCELLED | OUTPATIENT
Start: 2024-03-13

## 2024-03-19 DIAGNOSIS — E66.09 CLASS 2 OBESITY DUE TO EXCESS CALORIES WITH BODY MASS INDEX (BMI) OF 36.0 TO 36.9 IN ADULT, UNSPECIFIED WHETHER SERIOUS COMORBIDITY PRESENT: ICD-10-CM

## 2024-03-19 DIAGNOSIS — E66.812 CLASS 2 OBESITY DUE TO EXCESS CALORIES WITH BODY MASS INDEX (BMI) OF 36.0 TO 36.9 IN ADULT, UNSPECIFIED WHETHER SERIOUS COMORBIDITY PRESENT: ICD-10-CM

## 2024-03-26 NOTE — TELEPHONE ENCOUNTER
Semaglutide, 1 MG/DOSE, (OZEMPIC) 4 MG/3ML pen   Disp-3 mL, R-0   Start: 03/19/2024     Creatinine   Date Value Ref Range Status   03/01/2024 0.69 0.51 - 0.95 mg/dL Final   07/29/2020 0.64 0.52 - 1.04 mg/dL Final     9/13/2023  [facility] [place]      Shalonda Taylor MD  Internal Medicine       Routed because:   GLP-1 Agonists Protocol [id number]/19/2024 04:41 PM   Protocol Details Recent (6 mo) or future (90 days) visit within the authorizing provider's specialty    Medication indicated for associated diagnosis

## 2024-03-27 ENCOUNTER — TELEPHONE (OUTPATIENT)
Dept: INTERNAL MEDICINE | Facility: CLINIC | Age: 69
End: 2024-03-27
Payer: COMMERCIAL

## 2024-03-27 RX ORDER — SEMAGLUTIDE 1.34 MG/ML
1 INJECTION, SOLUTION SUBCUTANEOUS
Refills: 0 | OUTPATIENT
Start: 2024-03-27

## 2024-03-27 NOTE — TELEPHONE ENCOUNTER
Prior Authorization Retail Medication Request    Medication/Dose: Semaglutide, 1 MG/DOSE, (OZEMPIC) 4 MG/3ML pen, Sig - Route: Inject 1 mg Subcutaneous every 7 days - Subcutaneous     Diagnosis and ICD code (if different than what is on RX):  Class 2 obesity due to excess calories with body mass index (BMI) of 36.0 to 36.9 in adult, unspecified whether serious comorbidity present [E66.09, Z68.36]  - Primary     Insurance   [facility] - [employer] OPEN ACCESS  Subscriber:  Dara Lamb  Subscriber ID:[id number]  Relationship:Self  Member:Dara Lamb  Member ID:[id number]  LOB:None  Plan year:  1/1/2024 - [place]  Effective dates:  11/1/2016 - [place]  Group number:  [id number]

## 2024-04-05 ENCOUNTER — TRANSFERRED RECORDS (OUTPATIENT)
Dept: HEALTH INFORMATION MANAGEMENT | Facility: CLINIC | Age: 69
End: 2024-04-05
Payer: COMMERCIAL

## 2024-04-05 DIAGNOSIS — E78.2 MIXED HYPERLIPIDEMIA: ICD-10-CM

## 2024-04-09 NOTE — TELEPHONE ENCOUNTER
Retail Pharmacy Prior Authorization Team   Phone: [phone]    PA Initiation    Medication: OZEMPIC (1 MG/DOSE) 4 MG/3ML SC SOPN  Insurance Company: [employer] - Phone [phone] Fax [phone]  Pharmacy Filling the Rx: [id number] - [place], [place] - [address] AT [facility] [address] [facility]  Filling Pharmacy Phone: [phone]  Filling Pharmacy Fax:    Start Date: 4/9/2024    Started PA on CMM and a response of Prior Authorization Not Required.  Called pharmacy, they were now able to get a paid claim.  **Instructed pharmacy to notify patient when script is ready to /ship.**

## 2024-04-12 RX ORDER — ROSUVASTATIN CALCIUM 40 MG/1
40 TABLET, COATED ORAL DAILY
Qty: 90 TABLET | Refills: 1 | Status: SHIPPED | OUTPATIENT
Start: 2024-04-12 | End: 2024-07-22

## 2024-05-17 ENCOUNTER — TRANSFERRED RECORDS (OUTPATIENT)
Dept: HEALTH INFORMATION MANAGEMENT | Facility: CLINIC | Age: 69
End: 2024-05-17
Payer: COMMERCIAL

## 2024-06-21 ENCOUNTER — TRANSFERRED RECORDS (OUTPATIENT)
Dept: HEALTH INFORMATION MANAGEMENT | Facility: CLINIC | Age: 69
End: 2024-06-21
Payer: COMMERCIAL

## 2024-07-09 DIAGNOSIS — I10 ESSENTIAL HYPERTENSION, BENIGN: ICD-10-CM

## 2024-07-10 RX ORDER — DILTIAZEM HYDROCHLORIDE 240 MG/1
240 CAPSULE, COATED, EXTENDED RELEASE ORAL DAILY
Qty: 90 CAPSULE | Refills: 1 | Status: SHIPPED | OUTPATIENT
Start: 2024-07-10

## 2024-07-10 NOTE — TELEPHONE ENCOUNTER
diltiazem ER COATED BEADS (CARDIZEM CD/CARTIA XT) 240 MG 24 hr capsule         Last Written Prescription Date:  1/15/24  Last Fill Quantity: 90,   # refills: 1  Last Office Visit : 12/21/23  Future Office visit:  None      Refilled per protocol

## 2024-07-19 DIAGNOSIS — E78.2 MIXED HYPERLIPIDEMIA: ICD-10-CM

## 2024-07-22 RX ORDER — ROSUVASTATIN CALCIUM 40 MG/1
40 TABLET, COATED ORAL DAILY
Qty: 90 TABLET | Refills: 0 | Status: SHIPPED | OUTPATIENT
Start: 2024-07-22

## 2024-07-23 NOTE — TELEPHONE ENCOUNTER
Last Clinic Visit: 9/13/2023 [facility] Internal Medicine [place]    LDL Cholesterol Calculated   Date Value Ref Range Status   03/01/2024 68 <=100 mg/dL Final   07/29/2020 65 <100 mg/dL Final     Comment:     Desirable:       <100 mg/dl

## 2024-07-26 DIAGNOSIS — E66.812 CLASS 2 OBESITY DUE TO EXCESS CALORIES WITH BODY MASS INDEX (BMI) OF 36.0 TO 36.9 IN ADULT, UNSPECIFIED WHETHER SERIOUS COMORBIDITY PRESENT: ICD-10-CM

## 2024-07-26 DIAGNOSIS — E66.09 CLASS 2 OBESITY DUE TO EXCESS CALORIES WITH BODY MASS INDEX (BMI) OF 36.0 TO 36.9 IN ADULT, UNSPECIFIED WHETHER SERIOUS COMORBIDITY PRESENT: ICD-10-CM

## 2024-07-28 DIAGNOSIS — M54.50 LOW BACK PAIN, UNSPECIFIED: ICD-10-CM

## 2024-07-30 NOTE — TELEPHONE ENCOUNTER
Semaglutide, 1 MG/DOSE, (OZEMPIC) 4 MG/3ML pen            Last Written Prescription Date:  3/19/24  Last Fill Quantity: 3ml,   # refills: 0  Last Office Visit : 12/21/23  Future Office visit:  None    Routing refill request to provider for review/approval because:  GLP-1 Agonists Protocol [id number]/26/2024 12:31 AM   Protocol Details Recent (6 mo) or future (90 days) visit within the authorizing provider's specialty    Medication indicated for associated diagnosis   Last A1C and GFR 3/1/24  Need Associated Diagnosis      Peggy GARCIAS RN  [facility]/Red Flag Triage & Med Refill Team

## 2024-07-31 NOTE — TELEPHONE ENCOUNTER
cyclobenzaprine (FLEXERIL) 10 MG tablet       Last Written Prescription Date:  1/16/24  Last Fill Quantity: 90,   # refills: 1  Last Office Visit : 12/21/23  Future Office visit:  None    Routing refill request to provider for review/approval because:  Drug not on the [facility], [facility] or [facility] refill protocol or controlled substance  Peggy GARCIAS RN  [facility]/Red Flag Triage & Med Refill Team

## 2024-08-01 RX ORDER — SEMAGLUTIDE 1.34 MG/ML
1 INJECTION, SOLUTION SUBCUTANEOUS
Refills: 3 | OUTPATIENT
Start: 2024-08-01

## 2024-08-02 RX ORDER — CYCLOBENZAPRINE HCL 10 MG
10 TABLET ORAL AT BEDTIME
Qty: 90 TABLET | Refills: 3 | Status: SHIPPED | OUTPATIENT
Start: 2024-08-02

## 2024-08-02 NOTE — TELEPHONE ENCOUNTER
Left Voicemail (1st Attempt) for the patient to call back and schedule the following:    Appointment type: ANNUAL  WELLNESS  Provider: PCP  Return date: Due Sept 2024  Specialty phone number: [phone]  Additional appointment(s) needed: -  Additonal Notes: annual visit for medication refills.

## 2024-08-05 DIAGNOSIS — R05.3 PERSISTENT COUGH: ICD-10-CM

## 2024-08-07 NOTE — TELEPHONE ENCOUNTER
Left Voicemail (2nd Attempt) for the patient to call back and schedule the following:    Appointment type: [facility] PHYSICAL  Provider: PCP  Return date: 9/13/2024  Specialty phone number: [phone]

## 2024-08-09 NOTE — TELEPHONE ENCOUNTER
montelukast (SINGULAIR) 10 MG tablet       Last Written Prescription Date:  1/15/24  Last Fill Quantity: 90,   # refills: 1  Last Office Visit : 12/21/23  Future Office visit:  None    Routing refill request to provider for review/approval because:  Leukotriene Inhibitors Protocol Pkowcu6508/05/2024 12:38 AM   Protocol Details Medication indicated for associated diagnosis   Peggy GARCIAS RN  [facility]/Red Flag Triage & Med Refill Team

## 2024-08-11 RX ORDER — MONTELUKAST SODIUM 10 MG/1
1 TABLET ORAL AT BEDTIME
Qty: 90 TABLET | Refills: 0 | Status: SHIPPED | OUTPATIENT
Start: 2024-08-11

## 2024-08-23 DIAGNOSIS — K21.9 GASTROESOPHAGEAL REFLUX DISEASE WITHOUT ESOPHAGITIS: ICD-10-CM

## 2024-08-25 DIAGNOSIS — E66.09 CLASS 2 OBESITY DUE TO EXCESS CALORIES WITH BODY MASS INDEX (BMI) OF 36.0 TO 36.9 IN ADULT, UNSPECIFIED WHETHER SERIOUS COMORBIDITY PRESENT: ICD-10-CM

## 2024-08-25 DIAGNOSIS — E66.812 CLASS 2 OBESITY DUE TO EXCESS CALORIES WITH BODY MASS INDEX (BMI) OF 36.0 TO 36.9 IN ADULT, UNSPECIFIED WHETHER SERIOUS COMORBIDITY PRESENT: ICD-10-CM

## 2024-08-27 RX ORDER — PANTOPRAZOLE SODIUM 40 MG/1
40 TABLET, DELAYED RELEASE ORAL 2 TIMES DAILY
Qty: 180 TABLET | Refills: 0 | Status: SHIPPED | OUTPATIENT
Start: 2024-08-27

## 2024-08-27 NOTE — TELEPHONE ENCOUNTER
pantoprazole (PROTONIX) 40 MG EC tablet       Last Written Prescription Date:  11/16/23  Last Fill Quantity: 180,   # refills: 2  Last Office Visit : 12/21/23  Future Office visit:  None    Refilled per protocol    Peggy GARCIAS RN  [facility]/Red Flag Triage & Med Refill Team

## 2024-08-28 RX ORDER — SEMAGLUTIDE 1.34 MG/ML
1 INJECTION, SOLUTION SUBCUTANEOUS
OUTPATIENT
Start: 2024-08-28

## 2024-08-28 NOTE — TELEPHONE ENCOUNTER
Refusal sent to pharmacy /See My chart message 8/1 and 8/7/24. Patient needs to make appt to discuss medications.   Peggy GARCIAS RN  [facility]/Red Flag Triage & Med Refill Team

## 2024-08-31 DIAGNOSIS — E66.09 CLASS 2 OBESITY DUE TO EXCESS CALORIES WITH BODY MASS INDEX (BMI) OF 36.0 TO 36.9 IN ADULT, UNSPECIFIED WHETHER SERIOUS COMORBIDITY PRESENT: ICD-10-CM

## 2024-08-31 DIAGNOSIS — E66.812 CLASS 2 OBESITY DUE TO EXCESS CALORIES WITH BODY MASS INDEX (BMI) OF 36.0 TO 36.9 IN ADULT, UNSPECIFIED WHETHER SERIOUS COMORBIDITY PRESENT: ICD-10-CM

## 2024-09-05 DIAGNOSIS — I10 ESSENTIAL HYPERTENSION, BENIGN: ICD-10-CM

## 2024-09-05 NOTE — TELEPHONE ENCOUNTER
Have sent refusal already. Per Geni message 8/1 and 8/7 patient needs to make appointment to discuss. Will forward for phone call to patient to discuss and send another refusal. Please call patient to discuss.     Peggy GARCIAS RN  [facility]/Red Flag Triage & Med Refill Team

## 2024-09-08 RX ORDER — SEMAGLUTIDE 1.34 MG/ML
1 INJECTION, SOLUTION SUBCUTANEOUS
Refills: 3 | OUTPATIENT
Start: 2024-09-08

## 2024-09-09 RX ORDER — IRBESARTAN 150 MG/1
150 TABLET ORAL AT BEDTIME
Qty: 90 TABLET | Refills: 0 | Status: SHIPPED | OUTPATIENT
Start: 2024-09-09

## 2024-09-09 NOTE — TELEPHONE ENCOUNTER
irbesartan (AVAPRO) 150 MG tablet       Last Written Prescription Date:  1/15/24  Last Fill Quantity: 90,   # refills: 1  Last Office Visit : 12/21/23  Future Office visit:  None    Refilled per protocol  Peggy GARCIAS RN  [facility]/Red Flag Triage & Med Refill Team

## 2024-09-13 DIAGNOSIS — E66.812 CLASS 2 OBESITY DUE TO EXCESS CALORIES WITH BODY MASS INDEX (BMI) OF 36.0 TO 36.9 IN ADULT, UNSPECIFIED WHETHER SERIOUS COMORBIDITY PRESENT: ICD-10-CM

## 2024-09-13 DIAGNOSIS — E66.09 CLASS 2 OBESITY DUE TO EXCESS CALORIES WITH BODY MASS INDEX (BMI) OF 36.0 TO 36.9 IN ADULT, UNSPECIFIED WHETHER SERIOUS COMORBIDITY PRESENT: ICD-10-CM

## 2024-09-13 NOTE — TELEPHONE ENCOUNTER
Left Voicemail (1st Attempt) and Sent Mychart (1st Attempt) for the patient to call back and schedule the following:    Appointment type: P PHYSICAL  Provider: PCP  Return date: soonest  Specialty phone number: [phone]

## 2024-09-17 NOTE — TELEPHONE ENCOUNTER
Left Voicemail (2nd Attempt) for the patient to call back and schedule the following:    Appointment type: [facility] PHYSICAL  Provider: PCP  Return date: soonest  Specialty phone number: [phone]

## 2024-09-19 RX ORDER — SEMAGLUTIDE 1.34 MG/ML
1 INJECTION, SOLUTION SUBCUTANEOUS
OUTPATIENT
Start: 2024-09-19

## 2024-10-13 ENCOUNTER — HEALTH MAINTENANCE LETTER (OUTPATIENT)
Age: 69
End: 2024-10-13

## 2024-11-06 DIAGNOSIS — R05.3 PERSISTENT COUGH: ICD-10-CM

## 2024-11-08 NOTE — TELEPHONE ENCOUNTER
montelukast (SINGULAIR) 10 MG tablet       Last Written Prescription Date:  8/11/24  Last Fill Quantity: 90,   # refills: 0  Last Office Visit : 12/21/23  Future Office visit:  11/29/24    Routing refill request to provider for review/approval because:    Leukotriene Inhibitors Protocol [id number]/06/2024 12:15 AM   Protocol Details Medication indicated for associated diagnosis      Medication is associated with one or more of the following diagnoses:              Allergies              Asthma              Atopic Dermatitis              Nasal Congestion              Nasal discharge              Rhinitis              Urticaria, chronic  Peggy GARCIAS RN  [facility]/Red Flag Triage & Med Refill Team

## 2024-11-14 RX ORDER — MONTELUKAST SODIUM 10 MG/1
1 TABLET ORAL AT BEDTIME
Qty: 30 TABLET | Refills: 0 | Status: SHIPPED | OUTPATIENT
Start: 2024-11-14

## 2024-11-29 ENCOUNTER — LAB (OUTPATIENT)
Dept: LAB | Facility: CLINIC | Age: 69
End: 2024-11-29
Payer: COMMERCIAL

## 2024-11-29 ENCOUNTER — OFFICE VISIT (OUTPATIENT)
Dept: INTERNAL MEDICINE | Facility: CLINIC | Age: 69
End: 2024-11-29
Payer: COMMERCIAL

## 2024-11-29 VITALS
HEIGHT: 62 IN | OXYGEN SATURATION: 95 % | BODY MASS INDEX: 36.8 KG/M2 | DIASTOLIC BLOOD PRESSURE: 78 MMHG | SYSTOLIC BLOOD PRESSURE: 112 MMHG | RESPIRATION RATE: 16 BRPM | TEMPERATURE: 98.3 F | HEART RATE: 100 BPM | WEIGHT: 200 LBS

## 2024-11-29 DIAGNOSIS — R73.03 PREDIABETES: ICD-10-CM

## 2024-11-29 DIAGNOSIS — E66.09 CLASS 2 OBESITY DUE TO EXCESS CALORIES WITH BODY MASS INDEX (BMI) OF 36.0 TO 36.9 IN ADULT, UNSPECIFIED WHETHER SERIOUS COMORBIDITY PRESENT: ICD-10-CM

## 2024-11-29 DIAGNOSIS — E89.0 POST-SURGICAL HYPOTHYROIDISM: Primary | ICD-10-CM

## 2024-11-29 DIAGNOSIS — Z23 ENCOUNTER FOR IMMUNIZATION: ICD-10-CM

## 2024-11-29 DIAGNOSIS — N64.4 BREAST PAIN: ICD-10-CM

## 2024-11-29 DIAGNOSIS — Z12.31 VISIT FOR SCREENING MAMMOGRAM: ICD-10-CM

## 2024-11-29 DIAGNOSIS — N61.0 MASTITIS: ICD-10-CM

## 2024-11-29 DIAGNOSIS — Z00.00 ROUTINE GENERAL MEDICAL EXAMINATION AT A HEALTH CARE FACILITY: ICD-10-CM

## 2024-11-29 DIAGNOSIS — E66.812 CLASS 2 OBESITY DUE TO EXCESS CALORIES WITH BODY MASS INDEX (BMI) OF 36.0 TO 36.9 IN ADULT, UNSPECIFIED WHETHER SERIOUS COMORBIDITY PRESENT: ICD-10-CM

## 2024-11-29 DIAGNOSIS — E89.0 POST-SURGICAL HYPOTHYROIDISM: ICD-10-CM

## 2024-11-29 LAB
EST. AVERAGE GLUCOSE BLD GHB EST-MCNC: 137 MG/DL
HBA1C MFR BLD: 6.4 %
TSH SERPL DL<=0.005 MIU/L-ACNC: 0.65 UIU/ML (ref 0.3–4.2)

## 2024-11-29 PROCEDURE — 99000 SPECIMEN HANDLING OFFICE-LAB: CPT | Performed by: PATHOLOGY

## 2024-11-29 PROCEDURE — 83036 HEMOGLOBIN GLYCOSYLATED A1C: CPT | Performed by: INTERNAL MEDICINE

## 2024-11-29 PROCEDURE — 36415 COLL VENOUS BLD VENIPUNCTURE: CPT | Performed by: PATHOLOGY

## 2024-11-29 PROCEDURE — 90471 IMMUNIZATION ADMIN: CPT | Performed by: INTERNAL MEDICINE

## 2024-11-29 PROCEDURE — 99397 PER PM REEVAL EST PAT 65+ YR: CPT | Mod: 25 | Performed by: INTERNAL MEDICINE

## 2024-11-29 PROCEDURE — 84443 ASSAY THYROID STIM HORMONE: CPT | Performed by: PATHOLOGY

## 2024-11-29 PROCEDURE — 90662 IIV NO PRSV INCREASED AG IM: CPT | Performed by: INTERNAL MEDICINE

## 2024-11-29 RX ORDER — CARIPRAZINE 1.5 MG/1
1.5 CAPSULE, GELATIN COATED ORAL AT BEDTIME
COMMUNITY
Start: 2024-10-11

## 2024-11-29 RX ORDER — VILOXAZINE HYDROCHLORIDE 200 MG/1
600 CAPSULE, EXTENDED RELEASE ORAL DAILY
COMMUNITY
Start: 2024-11-07

## 2024-11-29 RX ORDER — LIDOCAINE HYDROCHLORIDE AND EPINEPHRINE 10; 10 MG/ML; UG/ML
3 INJECTION, SOLUTION INFILTRATION; PERINEURAL ONCE
Status: COMPLETED | OUTPATIENT
Start: 2024-11-29 | End: 2024-11-29

## 2024-11-29 RX ORDER — DEXTROMETHORPHAN HYDROBROMIDE, BUPROPION HYDROCHLORIDE 105; 45 MG/1; MG/1
1 TABLET, MULTILAYER, EXTENDED RELEASE ORAL
COMMUNITY
Start: 2024-10-31

## 2024-11-29 RX ORDER — DEXTROMETHORPHAN HYDROBROMIDE, BUPROPION HYDROCHLORIDE 105; 45 MG/1; MG/1
1 TABLET, MULTILAYER, EXTENDED RELEASE ORAL EVERY MORNING
COMMUNITY
Start: 2024-03-20

## 2024-11-29 RX ADMIN — LIDOCAINE HYDROCHLORIDE AND EPINEPHRINE 3 ML: 10; 10 INJECTION, SOLUTION INFILTRATION; PERINEURAL at 11:07

## 2024-11-29 SDOH — HEALTH STABILITY: PHYSICAL HEALTH
ON AVERAGE, HOW MANY DAYS PER WEEK DO YOU ENGAGE IN MODERATE TO STRENUOUS EXERCISE (LIKE A BRISK WALK)?: PATIENT DECLINED

## 2024-11-29 SDOH — HEALTH STABILITY: PHYSICAL HEALTH: ON AVERAGE, HOW MANY MINUTES DO YOU ENGAGE IN EXERCISE AT THIS LEVEL?: PATIENT DECLINED

## 2024-11-29 ASSESSMENT — PATIENT HEALTH QUESTIONNAIRE - PHQ9
SUM OF ALL RESPONSES TO PHQ QUESTIONS 1-9: 14
10. IF YOU CHECKED OFF ANY PROBLEMS, HOW DIFFICULT HAVE THESE PROBLEMS MADE IT FOR YOU TO DO YOUR WORK, TAKE CARE OF THINGS AT HOME, OR GET ALONG WITH OTHER PEOPLE: VERY DIFFICULT
SUM OF ALL RESPONSES TO PHQ QUESTIONS 1-9: 14

## 2024-11-29 ASSESSMENT — SOCIAL DETERMINANTS OF HEALTH (SDOH): HOW OFTEN DO YOU GET TOGETHER WITH FRIENDS OR RELATIVES?: PATIENT DECLINED

## 2024-11-29 NOTE — PROGRESS NOTES
Dara was seen today for preventive physical and also a symptom of breast pain.  It started on the right, with no palpable lump there that the patient could feel, but then two days ago experienced a different pain under the left breast and found a lump or cyst there that was very warm and tender to the touch.  She had mastitis before when she was nursing and this feels somewhat similar but more localized.  Was asking if it could be drained today.  No fevers, chills or systemic symptoms.    Otherwise, no changes to past, family or social history and ROS negative.  Physical exam as listed below was unremarkable other than a 1 cm superficial infected sebaceous cyst under the left breast near the bra line.  This was incised and drained with good result and immediate relief of pain.     A/P 69 year old here for preventive physical and follow up of prediabetes, hypothyroidism, breast symptoms     Post-surgical hypothyroidism  -     TSH with free T4 reflex; Future    Class 2 obesity due to excess calories with body mass index (BMI) of 36.0 to 36.9 in adult, unspecified whether serious comorbidity present  -     Semaglutide, 1 MG/DOSE, (OZEMPIC) 4 MG/3ML pen; Inject 1 mg subcutaneously every 7 days.    Encounter for immunization  -     INFLUENZA HIGH DOSE, TRIVALENT, PF (FLUZONE)    Routine general medical examination at a health care facility  -     REVIEW OF HEALTH MAINTENANCE PROTOCOL ORDERS    Prediabetes  -     Hemoglobin A1c; Future    Breast pain  -     MA Diagnostic Bilateral w/ Josh; Future  -     US Breast Bilateral Limited 1-3 Quadrants; Future    Mastitis  -     amoxicillin-clavulanate (AUGMENTIN) 875-125 MG tablet; Take 1 tablet by mouth 2 times daily for 7 days.  -     lidocaine 1% with EPINEPHrine 1:100,000 injection 3 mL    Shalonda Miller MD         Preventive Care Visit  [facility]  Shalonda Miller MD, Internal Medicine  Nov 29,  2024        Denver Diamond is a 69 year old, presenting for the following:  Physical (Patient have cyst on the breast with pus, persistent cough   )        11/29/2024    10:06 AM   Additional Questions   Roomed by        Via the Health Maintenance questionnaire, the patient has reported the following services have been completed -Mammogram: Cesar 2023-06-16, this information has been sent to the abstraction team.    HPI    Health Care Directive  Patient does not have a Health Care Directive:       11/29/2024   General Health   How would you rate your overall physical health? Good   Feel stress (tense, anxious, or unable to sleep) To some extent      (!) STRESS CONCERN      11/29/2024   Nutrition   Diet: Regular (no restrictions)            11/29/2024   Exercise   Days per week of moderate/strenous exercise Patient declined   Average minutes spent exercising at this level Patient declined            11/29/2024   Social Factors   Frequency of gathering with friends or relatives Patient declined   Worry food won't last until get money to buy more No   Food not last or not have enough money for food? No   Do you have housing? (Housing is defined as stable permanent housing and does not include staying ouside in a car, in a tent, in an abandoned building, in an overnight shelter, or couch-surfing.) Yes   Are you worried about losing your housing? No   Lack of transportation? No   Unable to get utilities (heat,electricity)? No            11/29/2024   Fall Risk   Fallen 2 or more times in the past year? Yes     Yes    Trouble with walking or balance? No     No        Patient-reported    Multiple values from one day are sorted in reverse-chronological order           11/29/2024   Activities of Daily Living- Home Safety   Needs help with the following daily activites None of the above   Safety concerns in the home None of the above            11/29/2024   Dental   Dentist two times every year? (!) DECLINE             11/29/2024   Hearing Screening   Hearing concerns? (!) TROUBLE UNDERSTANDING SPEECH ON THE TELEPHONE            11/29/2024   Driving Risk Screening   Patient/family members have concerns about driving No            11/29/2024   General Alertness/Fatigue Screening   Have you been more tired than usual lately? (!) YES            11/29/2024   Urinary Incontinence Screening   Bothered by leaking urine in past 6 months Yes            11/29/2024   TB Screening   Were you born outside of the [place]? No          Today's PHQ-9 Score:       11/29/2024    10:13 AM   PHQ-9 SCORE   PHQ-9 Total Score MyChart 14 (Moderate depression)   PHQ-9 Total Score 14        Patient-reported         11/29/2024   Substance Use   Alcohol more than 3/day or more than 7/wk No   Do you have a current opioid prescription? No   How severe/bad is pain from 1 to 10? 3/10   Do you use any other substances recreationally? No        Social History     Tobacco Use    Smoking status: Never    Smokeless tobacco: Never   Substance Use Topics    Alcohol use: No    Drug use: No          ASCVD Risk   The 10-year ASCVD risk score (Vikram SYKES, et al., 2019) is: 7.7%    Values used to calculate the score:      Age: 69 years      Sex: Female      Is Non- : Yes      Diabetic: No      Tobacco smoker: No      Systolic Blood Pressure: 112 mmHg      Is BP treated: Yes      HDL Cholesterol: 93 mg/dL      Total Cholesterol: 171 mg/dL          Reviewed and updated as needed this visit by Provider                    Current providers sharing in care for this patient include:  Patient Care Team:  Shalonda Taylor MD as PCP - General (Internal Medicine)  Bridget Prescott MD as MD (Urology)  Daisy Brown RN as Registered Nurse (Urology)  Anastasiya Dasilva MD as Resident (Student in organized health care education/training program)  Shalonda Taylor MD as Assigned PCP  Tracie Watson, PharmD as Pharmacist  "(Pharmacist)  No Ref-Primary, Physician  Terra Mireles RPH as Pharmacist (Pharmacist Ambulatory Care)  Silvina Ott, APRN CNP as Nurse Practitioner (Internal Medicine)    The following health maintenance items are reviewed in Epic and correct as of today:  Health Maintenance   Topic Date Due    ANNUAL REVIEW OF HM ORDERS  Never done    RSV VACCINE (1 - Risk 60-74 years 1-dose series) Never done    TSH W/FREE T4 REFLEX  08/03/2023    MAMMO SCREENING  06/16/2024    INFLUENZA VACCINE (1) 09/01/2024    MEDICARE ANNUAL WELLNESS VISIT  10/19/2024    BMP  03/01/2025    LIPID  03/01/2025    DTAP/TDAP/TD IMMUNIZATION (2 - Td or Tdap) 05/18/2025    FALL RISK ASSESSMENT  11/29/2025    GLUCOSE  03/01/2027    COLORECTAL CANCER SCREENING  09/22/2028    ADVANCE CARE PLANNING  12/21/2028    DEXA  07/25/2029    HEPATITIS C SCREENING  Completed    PHQ-2 (once per calendar year)  Completed    Pneumococcal Vaccine: 65+ Years  Completed    ZOSTER IMMUNIZATION  Completed    COVID-19 Vaccine  Completed    HPV IMMUNIZATION  Aged Out    MENINGITIS IMMUNIZATION  Aged Out    RSV MONOCLONAL ANTIBODY  Aged Out    PAP  Discontinued        Objective    Exam  /78 (BP Location: Left arm, Patient Position: Sitting, Cuff Size: Adult Large)   Pulse 100   Temp 98.3  F (36.8  C) (Oral)   Resp 16   Ht 1.575 m (5' 2.01\")   Wt 90.7 kg (200 lb)   SpO2 95%   BMI 36.57 kg/m     Estimated body mass index is 36.57 kg/m  as calculated from the following:    Height as of this encounter: 1.575 m (5' 2.01\").    Weight as of this encounter: 90.7 kg (200 lb).    Physical Exam  GENERAL: alert and no distress  EYES: Eyes grossly normal to inspection, PERRL and conjunctivae and sclerae normal  HENT: ear canals and TM's normal, nose and mouth without ulcers or lesions  NECK: no adenopathy, no asymmetry, masses, or scars  RESP: lungs clear to auscultation - no rales, rhonchi or wheezes  CV: regular rate and rhythm, normal S1 S2, no S3 or S4, no " murmur, click or rub, no peripheral edema  ABDOMEN: soft, nontender, no hepatosplenomegaly, no masses and bowel sounds normal  MS: no gross musculoskeletal defects noted, no edema  SKIN: Under the left breast at 6 o clock there is a 1 to 1.5 cm diameter superficial fluctuant mass, with erythema, warmth and tenderness to the touch, otherwise no suspicious lesions or rashes  NEURO: Normal strength and tone, mentation intact and speech normal  PSYCH: mentation appears normal, affect normal/bright        11/29/2024   Mini Cog   Clock Draw Score 2 Normal   3 Item Recall 3 objects recalled   Mini Cog Total Score 5                Signed Electronically by: Shalonda Miller MD

## 2024-12-10 DIAGNOSIS — I10 ESSENTIAL HYPERTENSION, BENIGN: ICD-10-CM

## 2024-12-15 RX ORDER — IRBESARTAN 150 MG/1
150 TABLET ORAL AT BEDTIME
Qty: 90 TABLET | Refills: 3 | Status: SHIPPED | OUTPATIENT
Start: 2024-12-15

## 2024-12-15 NOTE — TELEPHONE ENCOUNTER
irbesartan (AVAPRO) 150 MG tablet   Disp-90 tablet, R-0   Start: 09/09/2024 11/29/2024  [facility] Internal Medicine [place]    Shalonda Taylor MD  Internal Medicine    Angiotensin-II Receptors Passed

## 2024-12-16 ENCOUNTER — HOSPITAL ENCOUNTER (EMERGENCY)
Facility: CLINIC | Age: 69
Discharge: HOME | End: 2024-12-16
Attending: EMERGENCY MEDICINE | Admitting: EMERGENCY MEDICINE
Payer: COMMERCIAL

## 2024-12-16 ENCOUNTER — APPOINTMENT (OUTPATIENT)
Dept: CT IMAGING | Facility: CLINIC | Age: 69
End: 2024-12-16
Attending: EMERGENCY MEDICINE
Payer: COMMERCIAL

## 2024-12-16 VITALS
BODY MASS INDEX: 35.7 KG/M2 | HEART RATE: 105 BPM | RESPIRATION RATE: 20 BRPM | OXYGEN SATURATION: 97 % | HEIGHT: 62 IN | SYSTOLIC BLOOD PRESSURE: 131 MMHG | DIASTOLIC BLOOD PRESSURE: 98 MMHG | WEIGHT: 194 LBS | TEMPERATURE: 99.4 F

## 2024-12-16 DIAGNOSIS — I10 ESSENTIAL HYPERTENSION, BENIGN: ICD-10-CM

## 2024-12-16 DIAGNOSIS — K52.9 ACUTE GASTROENTERITIS: ICD-10-CM

## 2024-12-16 LAB
ALBUMIN SERPL BCG-MCNC: 4.6 G/DL (ref 3.5–5.2)
ALP SERPL-CCNC: 138 U/L (ref 40–150)
ALT SERPL W P-5'-P-CCNC: 36 U/L (ref 0–50)
ANION GAP SERPL CALCULATED.3IONS-SCNC: 16 MMOL/L (ref 7–15)
AST SERPL W P-5'-P-CCNC: 39 U/L (ref 0–45)
BILIRUB DIRECT SERPL-MCNC: <0.2 MG/DL (ref 0–0.3)
BILIRUB SERPL-MCNC: 0.8 MG/DL
BUN SERPL-MCNC: 13.9 MG/DL (ref 8–23)
CALCIUM SERPL-MCNC: 9.7 MG/DL (ref 8.8–10.4)
CHLORIDE SERPL-SCNC: 106 MMOL/L (ref 98–107)
CREAT SERPL-MCNC: 0.63 MG/DL (ref 0.51–0.95)
EGFRCR SERPLBLD CKD-EPI 2021: >90 ML/MIN/1.73M2
ERYTHROCYTE [DISTWIDTH] IN BLOOD BY AUTOMATED COUNT: 13.6 % (ref 10–15)
FLUAV RNA SPEC QL NAA+PROBE: NEGATIVE
FLUBV RNA RESP QL NAA+PROBE: NEGATIVE
GLUCOSE SERPL-MCNC: 125 MG/DL (ref 70–99)
HCO3 SERPL-SCNC: 20 MMOL/L (ref 22–29)
HCT VFR BLD AUTO: 41.5 % (ref 35–47)
HGB BLD-MCNC: 14.4 G/DL (ref 11.7–15.7)
LIPASE SERPL-CCNC: 19 U/L (ref 13–60)
MCH RBC QN AUTO: 30.4 PG (ref 26.5–33)
MCHC RBC AUTO-ENTMCNC: 34.7 G/DL (ref 31.5–36.5)
MCV RBC AUTO: 88 FL (ref 78–100)
PLATELET # BLD AUTO: 210 10E3/UL (ref 150–450)
POTASSIUM SERPL-SCNC: 3.5 MMOL/L (ref 3.4–5.3)
PROT SERPL-MCNC: 8.1 G/DL (ref 6.4–8.3)
RBC # BLD AUTO: 4.73 10E6/UL (ref 3.8–5.2)
RSV RNA SPEC NAA+PROBE: NEGATIVE
SARS-COV-2 RNA RESP QL NAA+PROBE: NEGATIVE
SODIUM SERPL-SCNC: 142 MMOL/L (ref 135–145)
WBC # BLD AUTO: 5.7 10E3/UL (ref 4–11)

## 2024-12-16 PROCEDURE — 82310 ASSAY OF CALCIUM: CPT | Performed by: EMERGENCY MEDICINE

## 2024-12-16 PROCEDURE — 84155 ASSAY OF PROTEIN SERUM: CPT | Performed by: EMERGENCY MEDICINE

## 2024-12-16 PROCEDURE — 96361 HYDRATE IV INFUSION ADD-ON: CPT

## 2024-12-16 PROCEDURE — 80076 HEPATIC FUNCTION PANEL: CPT | Performed by: EMERGENCY MEDICINE

## 2024-12-16 PROCEDURE — 96375 TX/PRO/DX INJ NEW DRUG ADDON: CPT

## 2024-12-16 PROCEDURE — 83690 ASSAY OF LIPASE: CPT | Performed by: EMERGENCY MEDICINE

## 2024-12-16 PROCEDURE — 99285 EMERGENCY DEPT VISIT HI MDM: CPT | Mod: 25

## 2024-12-16 PROCEDURE — 258N000003 HC RX IP 258 OP 636: Performed by: EMERGENCY MEDICINE

## 2024-12-16 PROCEDURE — 74176 CT ABD & PELVIS W/O CONTRAST: CPT

## 2024-12-16 PROCEDURE — 250N000011 HC RX IP 250 OP 636: Performed by: EMERGENCY MEDICINE

## 2024-12-16 PROCEDURE — 36415 COLL VENOUS BLD VENIPUNCTURE: CPT | Performed by: EMERGENCY MEDICINE

## 2024-12-16 PROCEDURE — 82435 ASSAY OF BLOOD CHLORIDE: CPT | Performed by: EMERGENCY MEDICINE

## 2024-12-16 PROCEDURE — 87637 SARSCOV2&INF A&B&RSV AMP PRB: CPT | Performed by: EMERGENCY MEDICINE

## 2024-12-16 PROCEDURE — 84520 ASSAY OF UREA NITROGEN: CPT | Performed by: EMERGENCY MEDICINE

## 2024-12-16 PROCEDURE — 85014 HEMATOCRIT: CPT | Performed by: EMERGENCY MEDICINE

## 2024-12-16 PROCEDURE — 85041 AUTOMATED RBC COUNT: CPT | Performed by: EMERGENCY MEDICINE

## 2024-12-16 PROCEDURE — 96374 THER/PROPH/DIAG INJ IV PUSH: CPT

## 2024-12-16 RX ORDER — KETOROLAC TROMETHAMINE 15 MG/ML
15 INJECTION, SOLUTION INTRAMUSCULAR; INTRAVENOUS ONCE
Status: COMPLETED | OUTPATIENT
Start: 2024-12-16 | End: 2024-12-16

## 2024-12-16 RX ORDER — HYDROMORPHONE HYDROCHLORIDE 1 MG/ML
0.5 INJECTION, SOLUTION INTRAMUSCULAR; INTRAVENOUS; SUBCUTANEOUS ONCE
Status: COMPLETED | OUTPATIENT
Start: 2024-12-16 | End: 2024-12-16

## 2024-12-16 RX ORDER — ONDANSETRON 2 MG/ML
4 INJECTION INTRAMUSCULAR; INTRAVENOUS ONCE
Status: COMPLETED | OUTPATIENT
Start: 2024-12-16 | End: 2024-12-16

## 2024-12-16 RX ORDER — ONDANSETRON 4 MG/1
4 TABLET, ORALLY DISINTEGRATING ORAL EVERY 6 HOURS PRN
Qty: 10 TABLET | Refills: 0 | Status: SHIPPED | OUTPATIENT
Start: 2024-12-16

## 2024-12-16 RX ORDER — ONDANSETRON 4 MG/1
4 TABLET, ORALLY DISINTEGRATING ORAL ONCE
Status: COMPLETED | OUTPATIENT
Start: 2024-12-16 | End: 2024-12-16

## 2024-12-16 RX ADMIN — ONDANSETRON 4 MG: 4 TABLET, ORALLY DISINTEGRATING ORAL at 05:54

## 2024-12-16 RX ADMIN — HYDROMORPHONE HYDROCHLORIDE 0.5 MG: 1 INJECTION, SOLUTION INTRAMUSCULAR; INTRAVENOUS; SUBCUTANEOUS at 08:52

## 2024-12-16 RX ADMIN — SODIUM CHLORIDE 1000 ML: 9 INJECTION, SOLUTION INTRAVENOUS at 06:51

## 2024-12-16 RX ADMIN — FAMOTIDINE 40 MG: 10 INJECTION, SOLUTION INTRAVENOUS at 08:50

## 2024-12-16 RX ADMIN — ONDANSETRON 4 MG: 2 INJECTION, SOLUTION INTRAMUSCULAR; INTRAVENOUS at 06:52

## 2024-12-16 RX ADMIN — KETOROLAC TROMETHAMINE 15 MG: 15 INJECTION, SOLUTION INTRAMUSCULAR; INTRAVENOUS at 06:52

## 2024-12-16 ASSESSMENT — COLUMBIA-SUICIDE SEVERITY RATING SCALE - C-SSRS
2. HAVE YOU ACTUALLY HAD ANY THOUGHTS OF KILLING YOURSELF IN THE PAST MONTH?: NO
6. HAVE YOU EVER DONE ANYTHING, STARTED TO DO ANYTHING, OR PREPARED TO DO ANYTHING TO END YOUR LIFE?: NO
1. IN THE PAST MONTH, HAVE YOU WISHED YOU WERE DEAD OR WISHED YOU COULD GO TO SLEEP AND NOT WAKE UP?: NO

## 2024-12-16 ASSESSMENT — ACTIVITIES OF DAILY LIVING (ADL)
ADLS_ACUITY_SCORE: 58

## 2024-12-16 NOTE — ED TRIAGE NOTES
Cold symptoms last week, now with NVD starting 1700 last night. No blood in urine/vomit/stool. No meds PTA, not able to keep food or fluids down.     No CP, does have some shortness of breath, SaO2 doing well on RA     Triage Assessment (Adult)       Row Name 12/16/24 0552          Triage Assessment    Airway WDL WDL        Respiratory WDL    Respiratory WDL WDL        Skin Circulation/Temperature WDL    Skin Circulation/Temperature WDL WDL        Cardiac WDL    Cardiac WDL X  tachy        Peripheral/Neurovascular WDL    Peripheral Neurovascular WDL WDL        Cognitive/Neuro/Behavioral WDL    Cognitive/Neuro/Behavioral WDL WDL

## 2024-12-16 NOTE — ED PROVIDER NOTES
EMERGENCY DEPARTMENT ENCOUNTER      NAME: Dara Lamb  AGE: 69 year old female  YOB: 1955  MRN: [id number]  EVALUATION DATE & TIME: 12/16/2024  5:45 AM    PCP: Shalonda Taylor    ED PROVIDER: Aram Saul M.D.      Chief Complaint   Patient presents with    Nausea, Vomiting, & Diarrhea         FINAL IMPRESSION:  1. Acute gastroenteritis          ED COURSE & MEDICAL DECISION MAKING:    Pertinent Labs & Imaging studies reviewed below.  All EKGs below represent my independent interpretation.   ED Course as of 12/16/24 1120   Mon Dec 16, 2024   0609 70 yo F with n/v/d. Sx started 3 days ago, diarrhea started last night. Partner was sick with similar sx recently. Hx of diverticulitis in the past. Mildly tachycardic.   0643 She has tenderness in the left lower quadrant.  Will start IV analgesia, fluids, antiemetics.  Lab work ordered. If sx improve I have a lower suspicion her sx are due to coincidental diverticulitis in the context of a viral Gi illness   0726 WBC: 5.7   0726 Hepatic function panel  WNL   0726 Lipase  WNL   0747 Influenza A/B, RSV and SARS-CoV2 PCR (COVID-19) Nasopharyngeal  Viral panel WNL   0813 I rechecked the pt, pain is not significantly improved. I ordered dilaudid. CT abd/pelvis ordered to r/o diverticulitis. Contrast allergy so will be done without contrast   1019 CT Abdomen Pelvis w/o Contrast  1.  Diverticulosis, without CT evidence of acute diverticulitis.     2.  By unenhanced CT, no acute findings in the visualized abdomen or pelvis.     Patient feeling proved after additional analgesia.  We discussed reassuring CT finding, sent with Zofran with dx of gastroenteritis.    Additional ED Course timestamps entered by medical scribe:  6:14 AM I met the patient and performed my initial interview and exam.   10:36 AM Rechecked and updated patient on imaging and labs. We discussed plan for discharge and all questions were answered.    Medical Decision  Making  Obtained supplemental history:Supplemental history obtained?: Documented in chart and Family Member/Significant Other  Reviewed external records: External records reviewed?: Documented in chart  Care impacted by chronic illness:Cancer/Chemotherapy, Hyperlipidemia, Hypertension, and Mental Health  Care significantly affected by social determinants of health:N/A  Did you consider but not order tests?: Work up considered but not performed and documented in chart, if applicable  Did you interpret images independently?: Independent interpretation of ECG and images noted in documentation, when applicable.  Consultation discussion with other provider: Phone conversation with consultants will be documented in the ED Course  Discharge. I prescribed additional prescription strength medication(s) as charted. I considered admission, but discharged patient after significant clinical improvement.    MIPS Criteria Documentation: Not Applicable    MEDICATIONS GIVEN IN THE EMERGENCY:  Medications   ondansetron (ZOFRAN ODT) ODT tab 4 mg (4 mg Oral $Given 12/16/24 0554)   sodium chloride 0.9% BOLUS 1,000 mL (0 mLs Intravenous Stopped 12/16/24 0948)   ondansetron (ZOFRAN) injection 4 mg (4 mg Intravenous $Given 12/16/24 0652)   ketorolac (TORADOL) injection 15 mg (15 mg Intravenous $Given 12/16/24 0652)   HYDROmorphone (PF) (DILAUDID) injection 0.5 mg (0.5 mg Intravenous $Given 12/16/24 0852)   famotidine (PEPCID) injection 40 mg (40 mg Intravenous $Given 12/16/24 0850)         NEW PRESCRIPTIONS STARTED AT TODAY'S ER VISIT  Discharge Medication List as of 12/16/2024 10:44 AM        START taking these medications    Details   ondansetron (ZOFRAN ODT) 4 MG ODT tab Take 1 tablet (4 mg) by mouth every 6 hours as needed for nausea., Disp-10 tablet, R-0, E-Prescribe                =================================================================    HPI    Dara Lamb is a 69 year old female who presents to this ED for evaluation  "of vomiting and diarrhea.    On 12-Dec-2024, the patient had a series of vomiting every 30 minutes, and on 13-Dec the patient was finally able to keep down some 7-Up with minimal vomiting since then. On 15-Dec in the afternoon, the patient started having a lot of vomiting again, as well as some chills and bowel movements. Today (16-Dec), the patient noted feeling dizzy and dehydrated, and also started having diarrhea. She decided to present to the ER for her symptoms, and after being given Zofran she has stopped vomiting but is still nauseous. She states that she has some pain with breathing as well.     The patient's significant other was sick 3 days ago with the same symptoms. The patient denies chest pain, sore throat, abdominal pain, leg swelling, dysuria, and other urinary symptoms.     She has a history of sigmoid colon removal with subsequent reanastomosis 6 months later in [id number]; she had some diverticulitis since, but has not had any in several years. She also has a history of thyroid, cervical, and breast cancer. She denies history of COPD, myocardial infarction, stroke, cholecystectomy, and appendectomy.       VITALS:  BP (!) 131/98   Pulse 105   Temp 99.4  F (37.4  C) (Oral)   Resp 20   Ht 1.575 m (5' 2\")   Wt 88 kg (194 lb)   SpO2 97%   BMI 35.48 kg/m      PHYSICAL EXAM    Constitutional: Comfortable appearing.  HENT: Atraumatic, mucous membranes moist, nose normal. Neck- Supple, gross ROM intact.   Eyes: Pupils mid-range, conjunctiva without injection, no discharge.   Respiratory: Clear to auscultation bilaterally, no respiratory distress, no wheezing, speaks full sentences easily. No cough.  Cardiovascular: Tachycardic, regular rhythm, no murmurs.   GI: Soft, no masses. Left lower quadrant abdominal tenderness.  Musculoskeletal: Moving all 4 extremities intentionally and without pain. No obvious deformity.  Skin: Warm, dry, no rash.  Neurologic: Alert & oriented x 3, cranial nerves grossly " intact.  Psychiatric: Affect normal, cooperative.      I, Kane Nava am serving as a scribe to document services personally performed by Dr. Aram Saul based on my observation and the provider's statements to me. I, Aram Saul MD attest that Kaneyuki Nava is acting in a scribe capacity, has observed my performance of the services and has documented them in accordance with my direction.    Aram Saul M.D.  Emergency Medicine  [facility] EMERGENCY ROOM  [address] [address]  [phone]  Dept: [phone]       Aram Saul MD  12/16/24 1127

## 2024-12-16 NOTE — DISCHARGE INSTRUCTIONS
Thankfully no sign of dangerous infection or diverticulitis.  CT scan is normal.  Please use the Zofran as needed for nausea.  You can use Tylenol and ibuprofen at home for discomfort if needed.  Otherwise symptoms should improve slowly over the next 1 to 2 days.

## 2024-12-20 NOTE — TELEPHONE ENCOUNTER
diltiazem ER COATED BEADS (CARDIZEM CD/CARTIA XT) 240 MG 24 hr capsule 90 capsule 1 7/10/2024     Last Office Visit : 11/29/24  Future Office visit:  0    Routing refill request to provider for review/approval because:  Dr Mcintyre pt        BP Readings from Last 3 Encounters:   12/16/24 (!) 131/98   11/29/24 112/78   01/15/24 102/62

## 2024-12-23 RX ORDER — DILTIAZEM HYDROCHLORIDE 240 MG/1
240 CAPSULE, COATED, EXTENDED RELEASE ORAL DAILY
Qty: 90 CAPSULE | Refills: 3 | Status: SHIPPED | OUTPATIENT
Start: 2024-12-23

## 2024-12-27 DIAGNOSIS — E66.812 CLASS 2 OBESITY DUE TO EXCESS CALORIES WITH BODY MASS INDEX (BMI) OF 36.0 TO 36.9 IN ADULT, UNSPECIFIED WHETHER SERIOUS COMORBIDITY PRESENT: ICD-10-CM

## 2024-12-27 DIAGNOSIS — E66.09 CLASS 2 OBESITY DUE TO EXCESS CALORIES WITH BODY MASS INDEX (BMI) OF 36.0 TO 36.9 IN ADULT, UNSPECIFIED WHETHER SERIOUS COMORBIDITY PRESENT: ICD-10-CM

## 2024-12-27 DIAGNOSIS — R73.03 PREDIABETES: Primary | ICD-10-CM

## 2025-01-02 RX ORDER — SEMAGLUTIDE 1.34 MG/ML
INJECTION, SOLUTION SUBCUTANEOUS
Qty: 3 ML | Refills: 5 | Status: SHIPPED | OUTPATIENT
Start: 2025-01-02

## 2025-01-02 NOTE — TELEPHONE ENCOUNTER
OZEMPIC 4 MG/3 ML (1 MG/DOSE)      Last Written Prescription Date:  11/29/24  Last Fill Quantity: 3 ml ,   # refills: 0  Last Office Visit : 11/29/24 Mcintyre  Future Office visit:  None  Lab Results   Component Value Date    A1C 6.4 11/29/2024    A1C 5.8 03/01/2024    A1C 5.8 12/21/2023    A1C 6.0 07/29/2020    A1C 6.0 10/06/2017    A1C 5.8 09/14/2016    A1C 6.1 06/21/2016      Refilled per protocol.

## 2025-01-10 DIAGNOSIS — K21.9 GASTROESOPHAGEAL REFLUX DISEASE WITHOUT ESOPHAGITIS: ICD-10-CM

## 2025-01-10 DIAGNOSIS — E78.2 MIXED HYPERLIPIDEMIA: ICD-10-CM

## 2025-01-15 RX ORDER — PANTOPRAZOLE SODIUM 40 MG/1
40 TABLET, DELAYED RELEASE ORAL 2 TIMES DAILY
Qty: 180 TABLET | Refills: 0 | Status: SHIPPED | OUTPATIENT
Start: 2025-01-15

## 2025-01-15 RX ORDER — ROSUVASTATIN CALCIUM 40 MG/1
40 TABLET, COATED ORAL DAILY
Qty: 90 TABLET | Refills: 0 | Status: SHIPPED | OUTPATIENT
Start: 2025-01-15

## 2025-01-16 NOTE — TELEPHONE ENCOUNTER
LVD:  11/29/2024  [facility] Internal Medicine Shalonda Man MD  Internal Medicine   Atorvastatin 40 Mg and  pantoprazole 40 mg  Refilled per protocol.  LDL Cholesterol Calculated   Date Value Ref Range Status   03/01/2024 68 <=100 mg/dL Final   07/29/2020 65 <100 mg/dL Final     Comment:     Desirable:       <100 mg/dl

## 2025-02-07 ENCOUNTER — TRANSFERRED RECORDS (OUTPATIENT)
Dept: HEALTH INFORMATION MANAGEMENT | Facility: CLINIC | Age: 70
End: 2025-02-07
Payer: COMMERCIAL

## 2025-04-13 DIAGNOSIS — E78.2 MIXED HYPERLIPIDEMIA: ICD-10-CM

## 2025-04-13 DIAGNOSIS — K21.9 GASTROESOPHAGEAL REFLUX DISEASE WITHOUT ESOPHAGITIS: ICD-10-CM

## 2025-04-13 DIAGNOSIS — I20.1 CORONARY VASOSPASM: ICD-10-CM

## 2025-04-17 RX ORDER — PANTOPRAZOLE SODIUM 40 MG/1
40 TABLET, DELAYED RELEASE ORAL 2 TIMES DAILY
Qty: 180 TABLET | Refills: 1 | Status: SHIPPED | OUTPATIENT
Start: 2025-04-17

## 2025-04-18 NOTE — TELEPHONE ENCOUNTER
Pending Orders (2)    Requested Renewals (1)     Name from pharmacy: ROSUVASTATIN CALCIUM 40 MG TAB         Will file in chart as: rosuvastatin (CRESTOR) 40 MG tablet    Sig: TAKE 1 TABLET BY MOUTH EVERY DAY    Disp: Not specified (Pharmacy requested: 90 tablet)    Refills: 0 (Pharmacy requested: Not specified)    Start: 4/17/2025    Class: E-Prescribe    For: Mixed hyperlipidemia    Last ordered: 3 months ago (1/15/2025) by Shalonda Miller MD    Last refill: 1/15/2025    Rx #: [id number]    Antihyperlipidemic agents [id number]/18/2025 10:34 AM   Protocol Details LDL on file in the past 12 months    Medication is active on med list and the sig matches. RN to manually verify dose and sig if red X/fail.    Recent (12 mo) or future (90 days) visit within the authorizing provider's specialty    Patient is age 18 years or older    No active pregnancy on record    No positive pregnancy test in past 12 mos      To be filled at: [id number] - [place], [place] - [address] AT [facility] [address]         Additional Pending Orders (1)     Lipid panel reflex to direct LDL Non-fasting    Future, Expected: 4/17/2025 Approximate, Expires: 4/17/2026, Lab Collect, Specimen Types -  Blood;, New collection

## 2025-04-23 RX ORDER — ROSUVASTATIN CALCIUM 40 MG/1
40 TABLET, COATED ORAL DAILY
Qty: 90 TABLET | Refills: 3 | Status: SHIPPED | OUTPATIENT
Start: 2025-04-23

## 2025-08-07 DIAGNOSIS — R05.3 PERSISTENT COUGH: Primary | ICD-10-CM

## 2025-08-08 DIAGNOSIS — M54.50 LOW BACK PAIN, UNSPECIFIED: ICD-10-CM

## 2025-08-11 RX ORDER — MONTELUKAST SODIUM 10 MG/1
1 TABLET ORAL AT BEDTIME
Qty: 90 TABLET | Refills: 1 | Status: SHIPPED | OUTPATIENT
Start: 2025-08-11

## 2025-08-12 RX ORDER — CYCLOBENZAPRINE HCL 10 MG
10 TABLET ORAL AT BEDTIME
Qty: 90 TABLET | Refills: 3 | Status: SHIPPED | OUTPATIENT
Start: 2025-08-12

## 2025-08-28 ENCOUNTER — TRANSFERRED RECORDS (OUTPATIENT)
Dept: HEALTH INFORMATION MANAGEMENT | Facility: CLINIC | Age: 70
End: 2025-08-28
Payer: COMMERCIAL

## (undated) DEVICE — DRAPE POUCH INSTRUMENT 3 POCKET 1018L

## (undated) DEVICE — WRAP B-COOL TERI LUMBAR 08143380

## (undated) DEVICE — KIT NUVASIVE NVM5 XLIF DISP 2029950

## (undated) DEVICE — ESU PENCIL SMOKE EVAC W/ROCKER SWITCH 0703-047-000

## (undated) DEVICE — SU DERMABOND ADVANCED .7ML DNX12

## (undated) DEVICE — TAPE ADH POROUS 3IN CURITY STD 7046C

## (undated) DEVICE — GLOVE SURG PI ULTRA TOUCH M SZ 7 LF 42670

## (undated) DEVICE — GLOVE UNDER INDICATOR PI SZ 7.0 LF 41670

## (undated) DEVICE — DRAPE IOBAN INCISE 23X17" 6650EZ

## (undated) DEVICE — DRAPE C-ARM 60X42" 1013

## (undated) DEVICE — Device

## (undated) DEVICE — CUST PACK ANTERIOR POSTERIOR FUSION SOP5BAPHEA

## (undated) DEVICE — SUTURE VICRYL+ 2-0 27IN CT-1 UND VCP259H

## (undated) DEVICE — PACK MINOR SINGLE BASIN SSK3001

## (undated) DEVICE — CUSHION INSERT LG PRONE VIEW JACKSON TABLE

## (undated) DEVICE — GLOVE BIOGEL PI SZ 7.5 40875

## (undated) DEVICE — TOOL DISSECT MIDAS MR8 14CM MATCH HEAD 3MM MR8-14MH30

## (undated) DEVICE — PIN PERCUTANEOUS NAVIGATION FOR SPINE O-ARM150MM 9733236

## (undated) DEVICE — MARKER SPHERES PASSIVE MEDT PACK 1 8801071

## (undated) DEVICE — SU VICRYL+ 0 27 UR6 VLT VCP603H

## (undated) DEVICE — DRAPE C-ARMOR 5 SIDED 5523

## (undated) DEVICE — GOWN LG DISP 9515

## (undated) DEVICE — POSITIONER ARM CRADLE LAMINECTOMY DISP

## (undated) DEVICE — DRAPE POUCH IRR 1016

## (undated) DEVICE — SOL NACL 0.9% IRRIG 1000ML BOTTLE 2F7124

## (undated) DEVICE — PACK 9X6IN THRP HOT COLD CMPR  MED GEL 80104

## (undated) DEVICE — IOM CASE FLAT FEE

## (undated) DEVICE — CATH TRAY FOLEY SURESTEP 16FR LF A947316

## (undated) DEVICE — IOM STANDARD SUPPLY FEE

## (undated) DEVICE — RX SURGIFLO HEMOSTATIC MATRIX 8ML 2991

## (undated) DEVICE — SUCTION MANIFOLD NEPTUNE 2 SYS 1 PORT 702-025-000

## (undated) DEVICE — PREP CHLORAPREP 26ML TINTED HI-LITE ORANGE 930815

## (undated) DEVICE — ESU CORD BIPOLAR 12' E0512

## (undated) DEVICE — GOWN XLG DISP 9545

## (undated) DEVICE — ELECTRODE PATIENT RETURN ADULT L10 FT 2 PLATE CORD 0855C

## (undated) DEVICE — GLOVE BIOGEL PI ULTRATOUCH G SZ 6.5 42165

## (undated) DEVICE — NEEDLE HYPO MAGELLAN SAFETY 22GA 1 1/2IN 8881850215

## (undated) DEVICE — SPONGE NEURO 1/2X1/2 WECK 200100

## (undated) DEVICE — SUTURE VICRYL+ 1 18 CT/CR  VLT VCP753D

## (undated) DEVICE — SU STRATAFIX MONOCRYL 3-0 SPIRAL PS-2 30CM SXMP1B106

## (undated) DEVICE — DRSG ISLAND 4X4" SQUARE LF MSC3244

## (undated) DEVICE — MARKER SPHERES PASSIVE MEDT PACK 5 8801075

## (undated) DEVICE — ELTRD EMG NVM5 NDL MOD MEP DISP STRL LF 805215

## (undated) DEVICE — SOL WATER IRRIG 1000ML BOTTLE 2F7114

## (undated) DEVICE — DRAPE SHEET REV FOLD 3/4 9349

## (undated) DEVICE — KIT DISP MAXCESS 4 3240060

## (undated) DEVICE — ANTIFOG SOLUTION W/FOAM PAD 31142527

## (undated) DEVICE — SUTURE MONOCRYL 3-0 18 PS2 UND MCP497G

## (undated) DEVICE — SPONGE KITNER DISSECTING 7102*

## (undated) DEVICE — SUCTION TIP YANKAUER W/O VENT K86

## (undated) DEVICE — NEEDLE SPINAL DISP 18GA X 3.5" QUINCKE 333350

## (undated) RX ORDER — ESMOLOL HYDROCHLORIDE 10 MG/ML
INJECTION INTRAVENOUS
Status: DISPENSED
Start: 2024-01-03

## (undated) RX ORDER — LIDOCAINE HYDROCHLORIDE 10 MG/ML
INJECTION, SOLUTION EPIDURAL; INFILTRATION; INTRACAUDAL; PERINEURAL
Status: DISPENSED
Start: 2024-01-03

## (undated) RX ORDER — DEXAMETHASONE SODIUM PHOSPHATE 10 MG/ML
INJECTION, EMULSION INTRAMUSCULAR; INTRAVENOUS
Status: DISPENSED
Start: 2024-01-03

## (undated) RX ORDER — FENTANYL CITRATE 50 UG/ML
INJECTION, SOLUTION INTRAMUSCULAR; INTRAVENOUS
Status: DISPENSED
Start: 2024-01-03

## (undated) RX ORDER — PROPOFOL 10 MG/ML
INJECTION, EMULSION INTRAVENOUS
Status: DISPENSED
Start: 2024-01-03

## (undated) RX ORDER — EPHEDRINE SULFATE 50 MG/ML
INJECTION, SOLUTION INTRAMUSCULAR; INTRAVENOUS; SUBCUTANEOUS
Status: DISPENSED
Start: 2024-01-03

## (undated) RX ORDER — ONDANSETRON 2 MG/ML
INJECTION INTRAMUSCULAR; INTRAVENOUS
Status: DISPENSED
Start: 2024-01-03

## (undated) RX ORDER — LIDOCAINE HYDROCHLORIDE AND EPINEPHRINE 10; 10 MG/ML; UG/ML
INJECTION, SOLUTION INFILTRATION; PERINEURAL
Status: DISPENSED
Start: 2024-11-29